# Patient Record
Sex: FEMALE | Race: WHITE | Employment: OTHER | URBAN - METROPOLITAN AREA
[De-identification: names, ages, dates, MRNs, and addresses within clinical notes are randomized per-mention and may not be internally consistent; named-entity substitution may affect disease eponyms.]

---

## 2017-02-21 ENCOUNTER — APPOINTMENT (OUTPATIENT)
Dept: GENERAL RADIOLOGY | Age: 77
DRG: 480 | End: 2017-02-21
Attending: PHYSICIAN ASSISTANT
Payer: MEDICARE

## 2017-02-21 ENCOUNTER — HOSPITAL ENCOUNTER (INPATIENT)
Age: 77
LOS: 5 days | Discharge: NURSING, REHAB OR LONG-TERM CARE | DRG: 480 | End: 2017-02-27
Attending: EMERGENCY MEDICINE | Admitting: HOSPITALIST
Payer: MEDICARE

## 2017-02-21 ENCOUNTER — APPOINTMENT (OUTPATIENT)
Dept: CT IMAGING | Age: 77
DRG: 480 | End: 2017-02-21
Attending: PHYSICIAN ASSISTANT
Payer: MEDICARE

## 2017-02-21 DIAGNOSIS — S72.422A CLOSED DISPLACED FRACTURE OF LATERAL CONDYLE OF LEFT FEMUR, INITIAL ENCOUNTER (HCC): Primary | ICD-10-CM

## 2017-02-21 DIAGNOSIS — S82.142A TIBIAL PLATEAU FRACTURE, LEFT, CLOSED, INITIAL ENCOUNTER: ICD-10-CM

## 2017-02-21 LAB
ALBUMIN SERPL BCP-MCNC: 4.1 G/DL (ref 3.4–5)
ALBUMIN/GLOB SERPL: 1.4 {RATIO} (ref 0.8–1.7)
ALP SERPL-CCNC: 66 U/L (ref 45–117)
ALT SERPL-CCNC: 30 U/L (ref 13–56)
ANION GAP BLD CALC-SCNC: 9 MMOL/L (ref 3–18)
APPEARANCE UR: ABNORMAL
AST SERPL W P-5'-P-CCNC: 28 U/L (ref 15–37)
BACTERIA URNS QL MICRO: ABNORMAL /HPF
BASOPHILS # BLD AUTO: 0 K/UL (ref 0–0.06)
BASOPHILS # BLD: 0 % (ref 0–2)
BILIRUB SERPL-MCNC: 0.4 MG/DL (ref 0.2–1)
BILIRUB UR QL: NEGATIVE
BUN SERPL-MCNC: 18 MG/DL (ref 7–18)
BUN/CREAT SERPL: 25 (ref 12–20)
CALCIUM SERPL-MCNC: 9.1 MG/DL (ref 8.5–10.1)
CHLORIDE SERPL-SCNC: 105 MMOL/L (ref 100–108)
CO2 SERPL-SCNC: 25 MMOL/L (ref 21–32)
COLOR UR: ABNORMAL
CREAT SERPL-MCNC: 0.71 MG/DL (ref 0.6–1.3)
DIFFERENTIAL METHOD BLD: ABNORMAL
EOSINOPHIL # BLD: 0 K/UL (ref 0–0.4)
EOSINOPHIL NFR BLD: 0 % (ref 0–5)
EPITH CASTS URNS QL MICRO: ABNORMAL /LPF (ref 0–5)
ERYTHROCYTE [DISTWIDTH] IN BLOOD BY AUTOMATED COUNT: 13.8 % (ref 11.6–14.5)
GLOBULIN SER CALC-MCNC: 2.9 G/DL (ref 2–4)
GLUCOSE SERPL-MCNC: 122 MG/DL (ref 74–99)
GLUCOSE UR STRIP.AUTO-MCNC: NEGATIVE MG/DL
HCT VFR BLD AUTO: 39.5 % (ref 35–45)
HGB BLD-MCNC: 13.4 G/DL (ref 12–16)
HGB UR QL STRIP: ABNORMAL
KETONES UR QL STRIP.AUTO: ABNORMAL MG/DL
LEUKOCYTE ESTERASE UR QL STRIP.AUTO: ABNORMAL
LYMPHOCYTES # BLD AUTO: 10 % (ref 21–52)
LYMPHOCYTES # BLD: 0.8 K/UL (ref 0.9–3.6)
MCH RBC QN AUTO: 35.5 PG (ref 24–34)
MCHC RBC AUTO-ENTMCNC: 33.9 G/DL (ref 31–37)
MCV RBC AUTO: 104.8 FL (ref 74–97)
MONOCYTES # BLD: 0.5 K/UL (ref 0.05–1.2)
MONOCYTES NFR BLD AUTO: 6 % (ref 3–10)
NEUTS SEG # BLD: 6.7 K/UL (ref 1.8–8)
NEUTS SEG NFR BLD AUTO: 84 % (ref 40–73)
NITRITE UR QL STRIP.AUTO: NEGATIVE
PH UR STRIP: 5 [PH] (ref 5–8)
PLATELET # BLD AUTO: 112 K/UL (ref 135–420)
PMV BLD AUTO: 11.4 FL (ref 9.2–11.8)
POTASSIUM SERPL-SCNC: 4.1 MMOL/L (ref 3.5–5.5)
PROT SERPL-MCNC: 7 G/DL (ref 6.4–8.2)
PROT UR STRIP-MCNC: 100 MG/DL
RBC # BLD AUTO: 3.77 M/UL (ref 4.2–5.3)
RBC #/AREA URNS HPF: ABNORMAL /HPF (ref 0–5)
SODIUM SERPL-SCNC: 139 MMOL/L (ref 136–145)
SP GR UR REFRACTOMETRY: 1.03 (ref 1–1.03)
UROBILINOGEN UR QL STRIP.AUTO: 1 EU/DL (ref 0.2–1)
WBC # BLD AUTO: 8.1 K/UL (ref 4.6–13.2)
WBC URNS QL MICRO: ABNORMAL /HPF (ref 0–4)

## 2017-02-21 PROCEDURE — 74011250637 HC RX REV CODE- 250/637: Performed by: HOSPITALIST

## 2017-02-21 PROCEDURE — 74011250636 HC RX REV CODE- 250/636: Performed by: HOSPITALIST

## 2017-02-21 PROCEDURE — 74011250636 HC RX REV CODE- 250/636: Performed by: PHYSICIAN ASSISTANT

## 2017-02-21 PROCEDURE — 99218 HC RM OBSERVATION: CPT

## 2017-02-21 PROCEDURE — 93005 ELECTROCARDIOGRAM TRACING: CPT

## 2017-02-21 PROCEDURE — 80053 COMPREHEN METABOLIC PANEL: CPT | Performed by: PHYSICIAN ASSISTANT

## 2017-02-21 PROCEDURE — 85025 COMPLETE CBC W/AUTO DIFF WBC: CPT | Performed by: PHYSICIAN ASSISTANT

## 2017-02-21 PROCEDURE — 99285 EMERGENCY DEPT VISIT HI MDM: CPT

## 2017-02-21 PROCEDURE — 77030032490 HC SLV COMPR SCD KNE COVD -B

## 2017-02-21 PROCEDURE — 96374 THER/PROPH/DIAG INJ IV PUSH: CPT

## 2017-02-21 PROCEDURE — 73560 X-RAY EXAM OF KNEE 1 OR 2: CPT

## 2017-02-21 PROCEDURE — 73700 CT LOWER EXTREMITY W/O DYE: CPT

## 2017-02-21 PROCEDURE — 71020 XR CHEST AP LAT: CPT

## 2017-02-21 PROCEDURE — 77030027138 HC INCENT SPIROMETER -A

## 2017-02-21 PROCEDURE — 81001 URINALYSIS AUTO W/SCOPE: CPT | Performed by: PHYSICIAN ASSISTANT

## 2017-02-21 PROCEDURE — 96376 TX/PRO/DX INJ SAME DRUG ADON: CPT

## 2017-02-21 RX ORDER — HYDROMORPHONE HYDROCHLORIDE 1 MG/ML
0.5 INJECTION, SOLUTION INTRAMUSCULAR; INTRAVENOUS; SUBCUTANEOUS
Status: DISCONTINUED | OUTPATIENT
Start: 2017-02-21 | End: 2017-02-22 | Stop reason: SDUPTHER

## 2017-02-21 RX ORDER — ERGOCALCIFEROL 1.25 MG/1
50000 CAPSULE ORAL
Status: DISCONTINUED | OUTPATIENT
Start: 2017-02-22 | End: 2017-02-27 | Stop reason: HOSPADM

## 2017-02-21 RX ORDER — MORPHINE SULFATE 2 MG/ML
1 INJECTION, SOLUTION INTRAMUSCULAR; INTRAVENOUS
Status: COMPLETED | OUTPATIENT
Start: 2017-02-21 | End: 2017-02-21

## 2017-02-21 RX ORDER — MORPHINE SULFATE 2 MG/ML
2 INJECTION, SOLUTION INTRAMUSCULAR; INTRAVENOUS
Status: DISCONTINUED | OUTPATIENT
Start: 2017-02-21 | End: 2017-02-21

## 2017-02-21 RX ORDER — LANOLIN ALCOHOL/MO/W.PET/CERES
1 CREAM (GRAM) TOPICAL 2 TIMES DAILY
Status: DISCONTINUED | OUTPATIENT
Start: 2017-02-21 | End: 2017-02-27 | Stop reason: HOSPADM

## 2017-02-21 RX ORDER — IBUPROFEN 200 MG
1 TABLET ORAL DAILY
Status: DISCONTINUED | OUTPATIENT
Start: 2017-02-22 | End: 2017-02-27 | Stop reason: HOSPADM

## 2017-02-21 RX ADMIN — FOLIC ACID TAB 400 MCG 1 MG: 400 TAB at 20:38

## 2017-02-21 RX ADMIN — HYDROMORPHONE HYDROCHLORIDE 0.5 MG: 1 INJECTION, SOLUTION INTRAMUSCULAR; INTRAVENOUS; SUBCUTANEOUS at 18:42

## 2017-02-21 RX ADMIN — MORPHINE SULFATE 1 MG: 2 INJECTION, SOLUTION INTRAMUSCULAR; INTRAVENOUS at 16:33

## 2017-02-21 RX ADMIN — HYDROMORPHONE HYDROCHLORIDE 0.5 MG: 1 INJECTION, SOLUTION INTRAMUSCULAR; INTRAVENOUS; SUBCUTANEOUS at 22:44

## 2017-02-21 RX ADMIN — MORPHINE SULFATE 1 MG: 2 INJECTION, SOLUTION INTRAMUSCULAR; INTRAVENOUS at 17:39

## 2017-02-21 NOTE — ED TRIAGE NOTES
Pt arrived via rescue from home with c/o left knee pain and swelling. Pt states she was getting off of the toilet when she lost her balance, landing on her left knee. Pt denies head injury or LOC.

## 2017-02-21 NOTE — ED NOTES
Went to assess patient and patient is currently in x-ray.  Assessment will be performed upon return to ER room

## 2017-02-21 NOTE — ED PROVIDER NOTES
Patient is a 78 y.o. female presenting with fall, knee pain, and knee swelling. The history is provided by the patient. Fall     Knee Pain      Knee Swelling         Eva Smith is a 78 y.o. female presents with c/o left knee pain. States fell today and landed on er knee. States hurts to straighten knee. Denies previous knee problem. Past Medical History:   Diagnosis Date    Rheumatoid arthritis, adult ([address]     Tachycardia        Past Surgical History:   Procedure Laterality Date    Hx hip replacement       left    Hx carotid endarterectomy       left    Hx cataract removal       bilateral         No family history on file. Social History     Social History    Marital status:      Spouse name: N/A    Number of children: N/A    Years of education: N/A     Occupational History    Not on file. Social History Main Topics    Smoking status: Current Every Day Smoker     Packs/day: 1.00    Smokeless tobacco: Not on file    Alcohol use No    Drug use: No    Sexual activity: Not on file     Other Topics Concern    Not on file     Social History Narrative         ALLERGIES: Aspirin and Codeine    Review of Systems  Constitutional:  Denies malaise, fever, chills. Head:  Denies injury. Face:  Denies injury or pain. ENMT:  Denies sore throat. Neck:  Denies injury or pain. Chest:  Denies injury. Cardiac:  Denies chest pain or palpitations. Respiratory:  Denies cough, wheezing, difficulty breathing, shortness of breath. GI/ABD:  Denies injury, pain, distention, nausea, vomiting, diarrhea. :  Denies injury, pain, dysuria or urgency. Back:  Denies injury or pain. Pelvis:  Denies injury or pain. Extremity/MS:  Knee pain  Neuro:  Denies headache, LOC, dizziness, neurologic symptoms/deficits/paresthesias. Skin: Denies injury, rash, itching or skin changes. There were no vitals filed for this visit.          Physical Exam   Nursing note and vitals reviewed. CONSTITUTIONAL: Alert, in no apparent distress; well-developed; well-nourished. HEAD:  Normocephalic, atraumatic. EYES: PERRL; EOM's intact. ENTM: Nose: No rhinorrhea; Throat: mucous membranes moist. Posterior pharynx-normal.  Neck:  No JVD, supple without lymphadenopathy. RESP: Chest clear, equal breath sounds. CV: S1 and S2 WNL; No murmurs, gallops or rubs. GI: Abdomen soft and non-tender. No masses or organomegaly. UPPER EXT:  Normal inspection. LOWER EXT: Left knee TTP over lateral aspect, Pt has knee splinted at 90., No hip tenderness, no erythema, good pedal pulse, good cap refill. NEURO: strength 5/5 and sym, sensation intact. SKIN: No rashes; Normal for age and stage. PSYCH:  Alert and oriented, normal affect. MDM  Number of Diagnoses or Management Options  Diagnosis management comments: DDX:Fracture, sprain, dislocation, contusion. IMPRESSION AND MEDICAL DECISION MAKING:  Based upon the patient's presentation with noted HPI and PE, along with the work up done in the emergency department, I believe that the patient has a femur fracture as well as a tibial plateau fracture. Will admit and have Ortho evaluate. Amount and/or Complexity of Data Reviewed  Clinical lab tests: ordered and reviewed  Tests in the radiology section of CPT®: ordered and reviewed  Tests in the medicine section of CPT®: ordered and reviewed  Discuss the patient with other providers: yes (Spoke with Ortho and advised to admit to Hospitalist and will follow her.   Spoke with Hospitalist and agreed to admit.)  Independent visualization of images, tracings, or specimens: yes      ED Course       Procedures

## 2017-02-22 ENCOUNTER — ANESTHESIA (OUTPATIENT)
Dept: SURGERY | Age: 77
DRG: 480 | End: 2017-02-22
Payer: MEDICARE

## 2017-02-22 ENCOUNTER — ANESTHESIA EVENT (OUTPATIENT)
Dept: SURGERY | Age: 77
DRG: 480 | End: 2017-02-22
Payer: MEDICARE

## 2017-02-22 ENCOUNTER — APPOINTMENT (OUTPATIENT)
Dept: GENERAL RADIOLOGY | Age: 77
DRG: 480 | End: 2017-02-22
Attending: ORTHOPAEDIC SURGERY
Payer: MEDICARE

## 2017-02-22 PROBLEM — S72.009A HIP FRACTURE (HCC): Status: ACTIVE | Noted: 2017-02-22

## 2017-02-22 LAB
ANION GAP BLD CALC-SCNC: 11 MMOL/L (ref 3–18)
ANION GAP BLD CALC-SCNC: 9 MMOL/L (ref 3–18)
APPEARANCE UR: ABNORMAL
ATRIAL RATE: 76 BPM
ATRIAL RATE: 99 BPM
BACTERIA URNS QL MICRO: NEGATIVE /HPF
BASOPHILS # BLD AUTO: 0 K/UL (ref 0–0.06)
BASOPHILS # BLD: 0 % (ref 0–2)
BILIRUB UR QL: NEGATIVE
BUN SERPL-MCNC: 11 MG/DL (ref 7–18)
BUN SERPL-MCNC: 12 MG/DL (ref 7–18)
BUN/CREAT SERPL: 14 (ref 12–20)
BUN/CREAT SERPL: 22 (ref 12–20)
CALCIUM SERPL-MCNC: 8.5 MG/DL (ref 8.5–10.1)
CALCIUM SERPL-MCNC: 9.3 MG/DL (ref 8.5–10.1)
CALCULATED P AXIS, ECG09: 42 DEGREES
CALCULATED P AXIS, ECG09: 82 DEGREES
CALCULATED R AXIS, ECG10: 15 DEGREES
CALCULATED R AXIS, ECG10: 30 DEGREES
CALCULATED T AXIS, ECG11: 118 DEGREES
CALCULATED T AXIS, ECG11: 125 DEGREES
CHLORIDE SERPL-SCNC: 100 MMOL/L (ref 100–108)
CHLORIDE SERPL-SCNC: 101 MMOL/L (ref 100–108)
CO2 SERPL-SCNC: 23 MMOL/L (ref 21–32)
CO2 SERPL-SCNC: 25 MMOL/L (ref 21–32)
COLOR UR: ABNORMAL
CREAT SERPL-MCNC: 0.54 MG/DL (ref 0.6–1.3)
CREAT SERPL-MCNC: 0.76 MG/DL (ref 0.6–1.3)
DIAGNOSIS, 93000: NORMAL
DIAGNOSIS, 93000: NORMAL
DIFFERENTIAL METHOD BLD: ABNORMAL
EOSINOPHIL # BLD: 0 K/UL (ref 0–0.4)
EOSINOPHIL NFR BLD: 0 % (ref 0–5)
EPITH CASTS URNS QL MICRO: NEGATIVE /LPF (ref 0–5)
ERYTHROCYTE [DISTWIDTH] IN BLOOD BY AUTOMATED COUNT: 13.6 % (ref 11.6–14.5)
GLUCOSE SERPL-MCNC: 127 MG/DL (ref 74–99)
GLUCOSE SERPL-MCNC: 175 MG/DL (ref 74–99)
GLUCOSE UR STRIP.AUTO-MCNC: 500 MG/DL
HCT VFR BLD AUTO: 33.9 % (ref 35–45)
HCT VFR BLD AUTO: 37.6 % (ref 35–45)
HGB BLD-MCNC: 11.4 G/DL (ref 12–16)
HGB BLD-MCNC: 12.5 G/DL (ref 12–16)
HGB UR QL STRIP: ABNORMAL
KETONES UR QL STRIP.AUTO: NEGATIVE MG/DL
LEUKOCYTE ESTERASE UR QL STRIP.AUTO: ABNORMAL
LYMPHOCYTES # BLD AUTO: 9 % (ref 21–52)
LYMPHOCYTES # BLD: 0.9 K/UL (ref 0.9–3.6)
MCH RBC QN AUTO: 34.5 PG (ref 24–34)
MCHC RBC AUTO-ENTMCNC: 33.2 G/DL (ref 31–37)
MCV RBC AUTO: 103.9 FL (ref 74–97)
MONOCYTES # BLD: 0.9 K/UL (ref 0.05–1.2)
MONOCYTES NFR BLD AUTO: 9 % (ref 3–10)
NEUTS SEG # BLD: 7.7 K/UL (ref 1.8–8)
NEUTS SEG NFR BLD AUTO: 82 % (ref 40–73)
NITRITE UR QL STRIP.AUTO: NEGATIVE
P-R INTERVAL, ECG05: 106 MS
P-R INTERVAL, ECG05: 136 MS
PH UR STRIP: 5 [PH] (ref 5–8)
PLATELET # BLD AUTO: 110 K/UL (ref 135–420)
PMV BLD AUTO: 11.4 FL (ref 9.2–11.8)
POTASSIUM SERPL-SCNC: 3.2 MMOL/L (ref 3.5–5.5)
POTASSIUM SERPL-SCNC: 3.8 MMOL/L (ref 3.5–5.5)
PROT UR STRIP-MCNC: 300 MG/DL
Q-T INTERVAL, ECG07: 370 MS
Q-T INTERVAL, ECG07: 402 MS
QRS DURATION, ECG06: 110 MS
QRS DURATION, ECG06: 110 MS
QTC CALCULATION (BEZET), ECG08: 452 MS
QTC CALCULATION (BEZET), ECG08: 474 MS
RBC # BLD AUTO: 3.62 M/UL (ref 4.2–5.3)
RBC #/AREA URNS HPF: NORMAL /HPF (ref 0–5)
SODIUM SERPL-SCNC: 134 MMOL/L (ref 136–145)
SODIUM SERPL-SCNC: 135 MMOL/L (ref 136–145)
SP GR UR REFRACTOMETRY: 1.02 (ref 1–1.03)
UROBILINOGEN UR QL STRIP.AUTO: 0.2 EU/DL (ref 0.2–1)
VENTRICULAR RATE, ECG03: 76 BPM
VENTRICULAR RATE, ECG03: 99 BPM
WBC # BLD AUTO: 9.5 K/UL (ref 4.6–13.2)
WBC URNS QL MICRO: NORMAL /HPF (ref 0–4)

## 2017-02-22 PROCEDURE — 99218 HC RM OBSERVATION: CPT

## 2017-02-22 PROCEDURE — 0QUC0JZ SUPPLEMENT LEFT LOWER FEMUR WITH SYNTHETIC SUBSTITUTE, OPEN APPROACH: ICD-10-PCS | Performed by: ORTHOPAEDIC SURGERY

## 2017-02-22 PROCEDURE — C1713 ANCHOR/SCREW BN/BN,TIS/BN: HCPCS | Performed by: ORTHOPAEDIC SURGERY

## 2017-02-22 PROCEDURE — 74011250636 HC RX REV CODE- 250/636

## 2017-02-22 PROCEDURE — 77030012407 HC DRN WND BARD -B: Performed by: ORTHOPAEDIC SURGERY

## 2017-02-22 PROCEDURE — 76210000016 HC OR PH I REC 1 TO 1.5 HR: Performed by: ORTHOPAEDIC SURGERY

## 2017-02-22 PROCEDURE — 76060000034 HC ANESTHESIA 1.5 TO 2 HR: Performed by: ORTHOPAEDIC SURGERY

## 2017-02-22 PROCEDURE — 74011000250 HC RX REV CODE- 250: Performed by: ORTHOPAEDIC SURGERY

## 2017-02-22 PROCEDURE — 85018 HEMOGLOBIN: CPT | Performed by: ORTHOPAEDIC SURGERY

## 2017-02-22 PROCEDURE — 77030020753 HC CUF TRNQT 1BLA STRY -B: Performed by: ORTHOPAEDIC SURGERY

## 2017-02-22 PROCEDURE — 77030008844 HC WRE K STRY -A: Performed by: ORTHOPAEDIC SURGERY

## 2017-02-22 PROCEDURE — 77030018836 HC SOL IRR NACL ICUM -A: Performed by: ORTHOPAEDIC SURGERY

## 2017-02-22 PROCEDURE — 77030002982 HC SUT POLYSRB J&J -A: Performed by: ORTHOPAEDIC SURGERY

## 2017-02-22 PROCEDURE — 36415 COLL VENOUS BLD VENIPUNCTURE: CPT | Performed by: HOSPITALIST

## 2017-02-22 PROCEDURE — 85025 COMPLETE CBC W/AUTO DIFF WBC: CPT | Performed by: HOSPITALIST

## 2017-02-22 PROCEDURE — 74011000258 HC RX REV CODE- 258

## 2017-02-22 PROCEDURE — 77030035002 HC SCR TIB LOK AXSOS STRY -C: Performed by: ORTHOPAEDIC SURGERY

## 2017-02-22 PROCEDURE — 74011000250 HC RX REV CODE- 250: Performed by: ANESTHESIOLOGY

## 2017-02-22 PROCEDURE — 77030020274 HC MISC IMPL ORTHOPEDIC: Performed by: ORTHOPAEDIC SURGERY

## 2017-02-22 PROCEDURE — 77030012510 HC MSK AIRWY LMA TELE -B: Performed by: ANESTHESIOLOGY

## 2017-02-22 PROCEDURE — 77030035005: Performed by: ORTHOPAEDIC SURGERY

## 2017-02-22 PROCEDURE — 77030035520 HC SCR CORT LOK AXSOS STRY -D: Performed by: ORTHOPAEDIC SURGERY

## 2017-02-22 PROCEDURE — 77030037063 HC PLT FEM DST LAT STRY -H1: Performed by: ORTHOPAEDIC SURGERY

## 2017-02-22 PROCEDURE — L1830 KO IMMOB CANVAS LONG PRE OTS: HCPCS | Performed by: ORTHOPAEDIC SURGERY

## 2017-02-22 PROCEDURE — 77030020253 HC SOL INJ D545NS .05 DEX .45 SAL

## 2017-02-22 PROCEDURE — 93306 TTE W/DOPPLER COMPLETE: CPT

## 2017-02-22 PROCEDURE — 77030035004: Performed by: ORTHOPAEDIC SURGERY

## 2017-02-22 PROCEDURE — 76010000162 HC OR TIME 1.5 TO 2 HR INTENSV-TIER 1: Performed by: ORTHOPAEDIC SURGERY

## 2017-02-22 PROCEDURE — 65270000029 HC RM PRIVATE

## 2017-02-22 PROCEDURE — 81001 URINALYSIS AUTO W/SCOPE: CPT | Performed by: HOSPITALIST

## 2017-02-22 PROCEDURE — 77030032490 HC SLV COMPR SCD KNE COVD -B: Performed by: ORTHOPAEDIC SURGERY

## 2017-02-22 PROCEDURE — 74011000258 HC RX REV CODE- 258: Performed by: INTERNAL MEDICINE

## 2017-02-22 PROCEDURE — 74011250637 HC RX REV CODE- 250/637: Performed by: HOSPITALIST

## 2017-02-22 PROCEDURE — 80048 BASIC METABOLIC PNL TOTAL CA: CPT | Performed by: HOSPITALIST

## 2017-02-22 PROCEDURE — 74011250636 HC RX REV CODE- 250/636: Performed by: HOSPITALIST

## 2017-02-22 PROCEDURE — 74011250636 HC RX REV CODE- 250/636: Performed by: ORTHOPAEDIC SURGERY

## 2017-02-22 PROCEDURE — 77030035512: Performed by: ORTHOPAEDIC SURGERY

## 2017-02-22 PROCEDURE — 74011000258 HC RX REV CODE- 258: Performed by: PHYSICIAN ASSISTANT

## 2017-02-22 PROCEDURE — 74011250637 HC RX REV CODE- 250/637: Performed by: ANESTHESIOLOGY

## 2017-02-22 PROCEDURE — 74011250636 HC RX REV CODE- 250/636: Performed by: PHYSICIAN ASSISTANT

## 2017-02-22 PROCEDURE — 74011250637 HC RX REV CODE- 250/637: Performed by: ORTHOPAEDIC SURGERY

## 2017-02-22 PROCEDURE — 77030013567 HC DRN WND RESERV BARD -A: Performed by: ORTHOPAEDIC SURGERY

## 2017-02-22 PROCEDURE — 0QSC04Z REPOSITION LEFT LOWER FEMUR WITH INTERNAL FIXATION DEVICE, OPEN APPROACH: ICD-10-PCS | Performed by: ORTHOPAEDIC SURGERY

## 2017-02-22 PROCEDURE — 74011000258 HC RX REV CODE- 258: Performed by: ORTHOPAEDIC SURGERY

## 2017-02-22 PROCEDURE — 74011000250 HC RX REV CODE- 250

## 2017-02-22 RX ORDER — SODIUM CHLORIDE 0.9 % (FLUSH) 0.9 %
5-10 SYRINGE (ML) INJECTION AS NEEDED
Status: DISCONTINUED | OUTPATIENT
Start: 2017-02-22 | End: 2017-02-27 | Stop reason: HOSPADM

## 2017-02-22 RX ORDER — DEXTROSE MONOHYDRATE AND SODIUM CHLORIDE 5; .45 G/100ML; G/100ML
INJECTION, SOLUTION INTRAVENOUS
Status: DISCONTINUED | OUTPATIENT
Start: 2017-02-22 | End: 2017-02-22 | Stop reason: HOSPADM

## 2017-02-22 RX ORDER — FAMOTIDINE 10 MG/ML
20 INJECTION INTRAVENOUS ONCE
Status: ACTIVE | OUTPATIENT
Start: 2017-02-22 | End: 2017-02-23

## 2017-02-22 RX ORDER — OXYCODONE AND ACETAMINOPHEN 7.5; 325 MG/1; MG/1
1 TABLET ORAL
Status: DISCONTINUED | OUTPATIENT
Start: 2017-02-22 | End: 2017-02-27 | Stop reason: HOSPADM

## 2017-02-22 RX ORDER — PROPOFOL 10 MG/ML
INJECTION, EMULSION INTRAVENOUS AS NEEDED
Status: DISCONTINUED | OUTPATIENT
Start: 2017-02-22 | End: 2017-02-22 | Stop reason: HOSPADM

## 2017-02-22 RX ORDER — SODIUM CHLORIDE, SODIUM LACTATE, POTASSIUM CHLORIDE, CALCIUM CHLORIDE 600; 310; 30; 20 MG/100ML; MG/100ML; MG/100ML; MG/100ML
75 INJECTION, SOLUTION INTRAVENOUS CONTINUOUS
Status: DISCONTINUED | OUTPATIENT
Start: 2017-02-22 | End: 2017-02-23

## 2017-02-22 RX ORDER — INSULIN LISPRO 100 [IU]/ML
INJECTION, SOLUTION INTRAVENOUS; SUBCUTANEOUS ONCE
Status: ACTIVE | OUTPATIENT
Start: 2017-02-22 | End: 2017-02-23

## 2017-02-22 RX ORDER — FENTANYL CITRATE 50 UG/ML
50 INJECTION, SOLUTION INTRAMUSCULAR; INTRAVENOUS
Status: DISCONTINUED | OUTPATIENT
Start: 2017-02-22 | End: 2017-02-22 | Stop reason: HOSPADM

## 2017-02-22 RX ORDER — SODIUM CHLORIDE 0.9 % (FLUSH) 0.9 %
5-10 SYRINGE (ML) INJECTION EVERY 8 HOURS
Status: DISCONTINUED | OUTPATIENT
Start: 2017-02-22 | End: 2017-02-27 | Stop reason: SDUPTHER

## 2017-02-22 RX ORDER — ALBUTEROL SULFATE 0.83 MG/ML
SOLUTION RESPIRATORY (INHALATION)
Status: DISPENSED
Start: 2017-02-22 | End: 2017-02-23

## 2017-02-22 RX ORDER — MORPHINE SULFATE 2 MG/ML
2 INJECTION, SOLUTION INTRAMUSCULAR; INTRAVENOUS
Status: DISCONTINUED | OUTPATIENT
Start: 2017-02-22 | End: 2017-02-22 | Stop reason: HOSPADM

## 2017-02-22 RX ORDER — DEXTROSE MONOHYDRATE AND SODIUM CHLORIDE 5; .45 G/100ML; G/100ML
75 INJECTION, SOLUTION INTRAVENOUS CONTINUOUS
Status: DISCONTINUED | OUTPATIENT
Start: 2017-02-22 | End: 2017-02-23

## 2017-02-22 RX ORDER — ALBUTEROL SULFATE 0.83 MG/ML
2.5 SOLUTION RESPIRATORY (INHALATION)
Status: COMPLETED | OUTPATIENT
Start: 2017-02-22 | End: 2017-02-22

## 2017-02-22 RX ORDER — ENOXAPARIN SODIUM 100 MG/ML
40 INJECTION SUBCUTANEOUS DAILY
Status: DISCONTINUED | OUTPATIENT
Start: 2017-02-23 | End: 2017-02-25

## 2017-02-22 RX ORDER — LIDOCAINE HYDROCHLORIDE 20 MG/ML
INJECTION, SOLUTION EPIDURAL; INFILTRATION; INTRACAUDAL; PERINEURAL AS NEEDED
Status: DISCONTINUED | OUTPATIENT
Start: 2017-02-22 | End: 2017-02-22 | Stop reason: HOSPADM

## 2017-02-22 RX ORDER — SODIUM CHLORIDE 0.9 % (FLUSH) 0.9 %
5-10 SYRINGE (ML) INJECTION EVERY 8 HOURS
Status: DISCONTINUED | OUTPATIENT
Start: 2017-02-22 | End: 2017-02-27 | Stop reason: HOSPADM

## 2017-02-22 RX ORDER — FENTANYL CITRATE 50 UG/ML
INJECTION, SOLUTION INTRAMUSCULAR; INTRAVENOUS AS NEEDED
Status: DISCONTINUED | OUTPATIENT
Start: 2017-02-22 | End: 2017-02-22 | Stop reason: HOSPADM

## 2017-02-22 RX ORDER — NALOXONE HYDROCHLORIDE 0.4 MG/ML
0.4 INJECTION, SOLUTION INTRAMUSCULAR; INTRAVENOUS; SUBCUTANEOUS AS NEEDED
Status: DISCONTINUED | OUTPATIENT
Start: 2017-02-22 | End: 2017-02-27 | Stop reason: HOSPADM

## 2017-02-22 RX ORDER — DOCUSATE SODIUM 100 MG/1
100 CAPSULE, LIQUID FILLED ORAL 2 TIMES DAILY
Status: DISCONTINUED | OUTPATIENT
Start: 2017-02-22 | End: 2017-02-27 | Stop reason: HOSPADM

## 2017-02-22 RX ORDER — HYDROMORPHONE HYDROCHLORIDE 1 MG/ML
1 INJECTION, SOLUTION INTRAMUSCULAR; INTRAVENOUS; SUBCUTANEOUS
Status: DISCONTINUED | OUTPATIENT
Start: 2017-02-22 | End: 2017-02-23

## 2017-02-22 RX ORDER — ACETAMINOPHEN 650 MG/1
975 SUPPOSITORY RECTAL AS NEEDED
Status: DISCONTINUED | OUTPATIENT
Start: 2017-02-22 | End: 2017-02-27 | Stop reason: HOSPADM

## 2017-02-22 RX ORDER — SODIUM CHLORIDE, SODIUM LACTATE, POTASSIUM CHLORIDE, CALCIUM CHLORIDE 600; 310; 30; 20 MG/100ML; MG/100ML; MG/100ML; MG/100ML
25 INJECTION, SOLUTION INTRAVENOUS CONTINUOUS
Status: DISCONTINUED | OUTPATIENT
Start: 2017-02-22 | End: 2017-02-22 | Stop reason: HOSPADM

## 2017-02-22 RX ADMIN — DOCUSATE SODIUM 100 MG: 100 CAPSULE, LIQUID FILLED ORAL at 21:30

## 2017-02-22 RX ADMIN — LIDOCAINE HYDROCHLORIDE 30 MG: 20 INJECTION, SOLUTION EPIDURAL; INFILTRATION; INTRACAUDAL; PERINEURAL at 15:45

## 2017-02-22 RX ADMIN — ACETAMINOPHEN 1000 MG: 160 SOLUTION ORAL at 20:12

## 2017-02-22 RX ADMIN — CEFAZOLIN 1 G: 1 INJECTION, POWDER, FOR SOLUTION INTRAVENOUS at 16:01

## 2017-02-22 RX ADMIN — DEXTROSE MONOHYDRATE AND SODIUM CHLORIDE 75 ML/HR: 5; .45 INJECTION, SOLUTION INTRAVENOUS at 09:54

## 2017-02-22 RX ADMIN — FENTANYL CITRATE 50 MCG: 50 INJECTION, SOLUTION INTRAMUSCULAR; INTRAVENOUS at 15:49

## 2017-02-22 RX ADMIN — ALBUTEROL SULFATE 2.5 MG: 2.5 SOLUTION RESPIRATORY (INHALATION) at 18:16

## 2017-02-22 RX ADMIN — Medication 10 ML: at 17:40

## 2017-02-22 RX ADMIN — DEXTROSE MONOHYDRATE AND SODIUM CHLORIDE 75 ML/HR: 5; .45 INJECTION, SOLUTION INTRAVENOUS at 17:40

## 2017-02-22 RX ADMIN — HYDROMORPHONE HYDROCHLORIDE 0.5 MG: 1 INJECTION, SOLUTION INTRAMUSCULAR; INTRAVENOUS; SUBCUTANEOUS at 12:40

## 2017-02-22 RX ADMIN — CEFAZOLIN SODIUM 1 G: 1 INJECTION, POWDER, FOR SOLUTION INTRAMUSCULAR; INTRAVENOUS at 21:33

## 2017-02-22 RX ADMIN — DEXTROSE MONOHYDRATE AND SODIUM CHLORIDE: 5; .45 INJECTION, SOLUTION INTRAVENOUS at 15:42

## 2017-02-22 RX ADMIN — FENTANYL CITRATE 50 MCG: 50 INJECTION, SOLUTION INTRAMUSCULAR; INTRAVENOUS at 16:45

## 2017-02-22 RX ADMIN — PROPOFOL 100 MG: 10 INJECTION, EMULSION INTRAVENOUS at 15:45

## 2017-02-22 RX ADMIN — HYDROMORPHONE HYDROCHLORIDE 0.5 MG: 1 INJECTION, SOLUTION INTRAMUSCULAR; INTRAVENOUS; SUBCUTANEOUS at 04:00

## 2017-02-22 RX ADMIN — HYDROMORPHONE HYDROCHLORIDE 1 MG: 1 INJECTION, SOLUTION INTRAMUSCULAR; INTRAVENOUS; SUBCUTANEOUS at 22:46

## 2017-02-22 RX ADMIN — HYDROMORPHONE HYDROCHLORIDE 0.5 MG: 1 INJECTION, SOLUTION INTRAMUSCULAR; INTRAVENOUS; SUBCUTANEOUS at 08:09

## 2017-02-22 NOTE — PROGRESS NOTES
Miriam   Discharge Planning/ Assessment    Reasons for Intervention: Interviewed patient, she agreed to share her discharge information with her , see below. Spoke to the patients daughter, consented, she stated she is POA, requested that she bring her POA paperwork in to be scanned into the patients record. She is NPO awaiting possible surgical intervention, cardiology consulted for clearance. She used a walker prior to admission and see Dr Maria Antonia Guevara for her primary care needs. She may require rehab and she has been placed in  E discharge , matching held at present. Case management will follow for discharge needs.      High Risk Criteria  [x] Yes  []No   Physician Referral  [] Yes  [x]No        Date    Nursing Referral  [] Yes  [x]No        Date    Patient/Family Request  [] Yes  [x]No        Date       Resources:    Medicare  [x] Yes  []No   Medicaid  [] Yes  [x]No   No Resources  [] Yes  [x]No   Private Insurance  [] Yes  [x]No    Name/Phone Number    Other  [] Yes  [x]No        (i.e. Workman's Comp)         Prior Services:    Prior Services  [] Yes  [x]No   Home Health  [] Yes  [x]No   [address] [place]  [] Yes  [x]No        Number of [address]  [] Yes  [x]No       Meals on Wheels  [] Yes  [x]No   Office on Aging  [] Yes  [x]No   Transportation Services  [] Yes  [x]No   Nursing Home  [] Yes  [x]No        Nursing Home Name    [address]  [] Yes  [x]No        [address] Name    Other       Information Source:      Information obtained from  [x] Patient  [] Parent   [] Guardian  [x] Child  [] Spouse   [] Significant Other/Partner   [] Friend      [] EMS    [] Nursing Home Chart          [x] Other: chart   Chart Review  [x] Yes  []No     Family/Support System:    Patient lives with  [] Alone    [x] Spouse   [] Significant Other  [] Children  [] Caretaker   [] Parent  [] Sibling     [] Other       Other Support System:    Is the patient responsible for care of others  [] Yes  [x]No   Information of person caring for patient on  discharge self   Managers financial affairs independently  [x] Yes  []No   If no, explain:      Status Prior to Admission:    Mental Status  [x] Awake  [x] Alert  [x] Oriented  [] Quiet/Calm [] Lethargic/Sedated   [] Disoriented  [] Restless/Anxious  [] Combative   Personal Care  [] Dependent  [x] [address]  [] Requires Assistance   Meal Preparation Ability  [x] Independent   [] Standby Assistance   [x] Minimal Assistance   [] Moderate Assistance  [] Maximum Assistance     [] Total Assistance   Chores  [] Independent with Chores   [] N/A Nursing Home Resident   [x] Requires Assistance   Bowel/Bladder  [x] Continent  [] Catheter  [] Incontinent  [] Ostomy Self-Care    [] Urine Diversion Self-Care  [] Maximum Assistance     [] Total Assistance   Number of Persons needed for assistance    DME at home  [] [employer], Fawn Bhatti  [] [employer], Straight   [] Commode    [] Bathroom/Grab Bars  [] Hospital Bed  [] Nebulizer  [] Oxygen           [] Raised Toilet Seat  [] Shower Chair  [] Side Rails for Bed   [] Tub Transfer Bench   [x] Teodora Beecadenm  [] Arnol Infante      [] Other:   Vendor      Treatment Presently Receiving:    Current Treatments  [] Chemotherapy  [] Dialysis  [] Insulin  [] IVAB [x] IVF   [] O2  [] PCA   [] PT   [] RT   [] Tube Feedings   [] Wound Care     Psychosocial Evaluation:    Verbalized Knowledge of Disease Process  [x] Patient  [x]Family   Coping with Disease Process  [x] Patient  [x]Family   Requires Further Counseling Coping with Disease Process  [] Patient  []Family     Identified Projected Needs:    Home Health Aid  [] Yes  [x]No   Transportation  [x] Yes  []No   Education  [] Yes  [x]No        Specific Education     Financial Counseling  [] Yes  [x]No   Inability to Care for Self/Will Require 24 hour care  [] Yes  [x]No   Pain Management  [] Yes  [x]No   Home Infusion Therapy  [] Yes  [x]No Oxygen Therapy  [] Yes  [x]No   DME  [] Yes  [x]No   Long Term Care Placement  [] Yes  [x]No   Rehab  [x] Yes  []No   Physical Therapy  [x] Yes  []No   Needs Anticipated At This Time  [x] Yes  []No     Intra-Hospital Referral:    [facility]  [x] Yes  []No     [] Yes  [x]No   Patient Representative  [] Yes  [x]No   Staff for Teaching Needs  [] Yes  [x]No   Specialty Teaching Needs     Diabetic Educator  [] Yes  [x]No   Referral for Diabetic Educator Needed  [] Yes  [x]No  If Yes, place order for Nutritionist or Diabetic Consult     Tentative Discharge Plan:    Home with No Services  [] Yes  [x]No   Home with Home Health Follow-up  [x] Yes  []No        If Yes, specify type nursing/therapy   [address] [place] [address]  [] Yes  [x]No        If Yes, specify type    Meals on Wheels  [] Yes  [x]No   Office of Aging  [] Yes  [x]No   NHP  [] Yes  [x]No   Return to the Nursing Home  [] Yes  [x]No   Rehab Therapy  [x] Yes  []No   Acute Rehab  [] Yes  [x]No   Subacute Rehab  [x] Yes  [x]No   Private Care  [] Yes  [x]No   Substance Abuse Referral  [] Yes  [x]No   Transportation  [] Yes  [x]No   Chore Service  [] Yes  [x]No   Inpatient Hospice  [] Yes  [x]No   OP RT  [] Yes  [x] No   OP Hemo  [] Yes  [x] No   OP PT  [x] Yes  []No   Support Group  [] Yes  [x]No   Reach to Recovery  [] Yes  [x]No   OP Oncology Clinic  [] Yes  [x]No   Clinic Appointment  [] Yes  [x]No   DME  [] Yes  [x]No   Comments    Name of D/C Planner or  Given to Patient or Family Teresa Natarajan, GRISEL   Phone Number [phone]   Date Feb 22, 2017   Time 732 am   If you are discharged home, whom do you designate to participate in your discharge plan and receive any information needed?      Enter name of Hitesh Huang  spouse        Phone # of designee         Address of [address] [place] [address] [address]        Updated Feb 22, 2017        Patient refused to designate any individual

## 2017-02-22 NOTE — PROGRESS NOTES
conducted an initial consultation and Spiritual Assessment for Eva Smith, who is a 78 y.o.,female. Patients Primary Language is: [place]. According to the patients EMR Moravian Affiliation is: Other. The reason the Patient came to the hospital is: There are no active problems to display for this patient. The  provided the following Interventions:   attempted ericka Raygoza o initiate a relationship of care and support with patient in room 2219 at 0930 this morning but found patient not in any condition to communicate this morning. Listened empathically to family members as patient was lying in bed with blankets pulled up. Patient was resting and still in some discomfort from surgery. Provided information about Spiritual Care Services. Offered prayer and assurance of continued prayers on patients behalf. The following outcomes were achieved:  Patient shared limited information about her medical narrative. Patient processed feeling about current hospitalization. Patient expressed gratitude for pastoral care visit. Assessment:  Patient does not have any Mu-ism/cultural needs that will affect patients preferences in health care. There are no further spiritual or Mu-ism issues which require Spiritual Care Services interventions at this time. Plan:  Chaplains will continue to follow and will provide pastoral care on an as needed/requested basis    . Aliyah Morales   Spiritual Care   [phone]

## 2017-02-22 NOTE — PROGRESS NOTES
1800 - pt received pt on floor. Pt is AO. Pain rated 10/10 to left knee. 65 - paged Dr. Colt Hernandez for order for continuous fluid and possible bp medication. Pt's BP is elevated 195/72.     1938 - received no call back from page.

## 2017-02-22 NOTE — CONSULTS
Consult    Patient: Mary Carmen Day MRN: [id number]  SSN: [id number]    YOB: 1940  Age: 78 y.o. Sex: female      Subjective:      Mary Carmen Day is a 78 y.o. female PMHx of osteoporosis, tobacco use who presented to the ED with acute onset of L knee pain after a mechanical fall at home. she was using the bathroom and when she got up and was pulling her pants up she lost her balance and fell to the ground on her left leg. In the ED, imaging showed an acute fracture of the distal L femur . She is unable to straighten her knee due to pain and deformity. Patient denies head trauma or LOC. Patient's daughter is at the bedside. Past Medical History:   Diagnosis Date    Rheumatoid arthritis, adult ([address]     Tachycardia      Past Surgical History:   Procedure Laterality Date    HX CAROTID ENDARTERECTOMY      left    HX CATARACT REMOVAL      bilateral    HX HIP REPLACEMENT      left      No family history on file.   Social History   Substance Use Topics    Smoking status: Current Every Day Smoker     Packs/day: 1.00    Smokeless tobacco: Not on file    Alcohol use No      Current Facility-Administered Medications   Medication Dose Route Frequency Provider Last Rate Last Dose    dextrose 5 % - 0.45% NaCl infusion  75 mL/hr IntraVENous CONTINUOUS Bryan Oliveira MD 75 mL/hr at 02/22/17 0954 75 mL/hr at 02/22/17 0954    HYDROmorphone (PF) (DILAUDID) injection 0.5 mg  0.5 mg IntraVENous Q4H PRN Fela Goncalves, DO   0.5 mg at 02/22/17 7792    ergocalciferol (ERGOCALCIFEROL) capsule 50,000 Units  50,000 Units Oral Q7D Fela Goncalves, DO   Stopped at 28/07/59 2230    folic acid tablet 1 mg  1 mg Oral BID Fela Goncalves, DO   Stopped at 02/22/17 0900    nicotine (NICODERM CQ) 14 mg/24 hr patch 1 Patch  1 Patch TransDERmal DAILY Fela Goncalves, DO   1 Patch at 02/22/17 3890        Allergies   Allergen Reactions    Aspirin Unknown (comments)    Codeine Unknown (comments) Review of Systems:  Pertinent items are noted in the History of Present Illness. Objective:     Vitals:    02/21/17 2355 02/22/17 0451 02/22/17 0836 02/22/17 0927   BP: 159/72 175/63 168/69    Pulse: 100 98 96    Resp: 15 15 15    Temp: 97.8 °F (36.6 °C) 98 °F (36.7 °C) 98 °F (36.7 °C)    SpO2: 92% 91% (!) 89%    Weight:    39.9 kg (88 lb)   Height:    4' 11\" (1.499 m)        Physical Exam:  [id number]  LLE warm and perfused, L Knee flexed to 90 degrees, no open wounds, ttp diffusely over knee, +effusion, AT and GS motor function 5/5 , sensation intact to light touch, 2+ DP pulse. No pain with ROM hip. RLE warm and perfused,R Knee nontender along joint line, no erythema or edema, full ROM, stable to varus/valgus/anterior/posterior forces, 2+ DP pulse. BLE compartments soft. Xray L knee:  1. Impacted, angulated, and intra-articular fracture of the distal left femur,  with mild lateral displacement of the distal fracture fragment. CT L knee  IMPRESSION:  1. Comminuted, impacted, intra-articular fracture of the distal left femur, with  predominant involvement of the distal lateral femoral condyle and  intra-articular extension across the intercondylar notch. 2. Nondisplaced fracture of the lateral tibial plateau, with minimal articular  surface incongruency. 3. Large volume lipohemarthrosis. 4. Severe underlying osteopenia.   Assessment:   77 yo female with Impacted, angulated, and intra-articular fracture of the distal left femur s/p fall at home yesterday    Plan:     OR today with  for ORIF left distal femur  NPO for surgery  Cleared from medicine and cardiology standpoint      Signed By: KOLE Ramirez     February 22, 2017

## 2017-02-22 NOTE — PROGRESS NOTES
Problem: Discharge Planning  Goal: *Discharge to safe environment  Outcome: Progressing Towards Goal  Plan is to discharge to a safe envioronment

## 2017-02-22 NOTE — PROGRESS NOTES
Received bedside verbal report from [facility] in bed,complaining of pain and wants pain med now,explain her that it's quite not time yet,bed at the lowest position,call bell within reach,white board updated. 0809 Pain med given,morning med given,complate assessment done,will continue to monitor her. 0930 Pt still having pain,reassured her,helped her to use bedpan,daughter at bedside. 1130 Pt is sleeping,daughter at bedside,call bell within reach. 1240 Pt sleeping,paim med given,will continue to monitor her. 5 Pt left room with OR tech. 1911 Pt arrived to room,family at bedside. Bedside and Verbal shift change report given to Rashid Walker (oncoming nurse) by Janes Yu (offgoing nurse). Report included the following information SBAR, Kardex, OR Summary, Intake/Output, MAR and Recent Results.

## 2017-02-22 NOTE — ANESTHESIA POSTPROCEDURE EVALUATION
Post-Anesthesia Evaluation and Assessment    Patient: Farhan Morris MRN: [id number]  SSN: [id number]    YOB: 1940  Age: 78 y.o. Sex: female       Cardiovascular Function/Vital Signs  Visit Vitals    /83    Pulse (!) 118    Temp 37.8 °C (100.1 °F)    Resp 27    Ht 4' 11\" (1.499 m)    Wt 39.9 kg (88 lb)    SpO2 94%    BMI 17.77 kg/m2       Patient is status post spinal, general anesthesia for Procedure(s):    LEFT DISTAL FEMUR OPEN REDUCTION INTERNAL FIXATION. Nausea/Vomiting: None    Postoperative hydration reviewed and adequate. Pain:  Pain Scale 1: (P) Visual (02/22/17 1806)  Pain Intensity 1: (P) 0 (02/22/17 1806)   Managed    Neurological Status:   Neuro (WDL): Within Defined Limits (immediate post op, still sedated) (02/22/17 1721)   At baseline    Mental Status and Level of Consciousness: Arousable    Pulmonary Status:   O2 Device: Non-rebreather mask (02/22/17 1736)   Adequate oxygenation and airway patent    Complications related to anesthesia: None    Post-anesthesia assessment completed.  No concerns    Signed By: Deana Roach MD     February 22, 2017

## 2017-02-22 NOTE — PROGRESS NOTES
Nutrition initial assessment/Plan of care      RECOMMENDATIONS:   1. NPO. Advance diet when medically indicated  2. Monitor weight and PO intake  3. RD to follow     GOALS:   1. PO intake meets >75% of protein/calorie needs by 2/25  2. Weight Maintenance/Gradual weight gain (1-2 lb) by 2/29    ASSESSMENT:   Per BMI of 17.8, weight is in the underweight classification. PO intake is poor vs NPO order. Labs noted. Nutrition recommendations listed. RD to follow. Nutrition Diagnoses:   Underweight related to poor appetite as evidenced by BMI of 17.8. Nutrition Risk:  [x] High  [] Moderate []  Low    SUBJECTIVE/OBJECTIVE:   Pt with acute left distal femur fracture and left tibial plateau fracture. Currently NPO for possible surgery. No known food allergy. Pt's family report weight of 88 lb and states that her weight has never been over 100 lb. No trouble chewing/swallowing however pt usually wear dentures and doesn't have them with her. Will follow. Information Obtained from:    [x] Chart Review   [x] Patient   [x] Family/Caregiver   [] Nurse/Physician   [] Interdisciplinary Meeting/Rounds    Diet: NPO  Medications: [x] Reviewed (Ergocalciferol, Dextrose5%-0.45%NaCl: 75 ml/hr)   Allergies: [x] Reviewed   Encounter Diagnoses     ICD-10-CM ICD-9-CM   1. Closed displaced fracture of lateral condyle of left femur, initial encounter ([facility] S72.422A 821.21   2.  Tibial plateau fracture, left, closed, initial encounter S82.142A 823.00     Past Medical History:   Diagnosis Date    Rheumatoid arthritis, adult ([facility]     Tachycardia       Labs:    Lab Results   Component Value Date/Time    Sodium 135 02/22/2017 03:30 AM    Potassium 3.8 02/22/2017 03:30 AM    Chloride 101 02/22/2017 03:30 AM    CO2 23 02/22/2017 03:30 AM    Anion gap 11 02/22/2017 03:30 AM    Glucose 127 02/22/2017 03:30 AM    BUN 12 02/22/2017 03:30 AM    Creatinine 0.54 02/22/2017 03:30 AM    Calcium 9.3 02/22/2017 03:30 AM    Magnesium 1.8 08/18/2010 06:15 AM    Phosphorus 2.8 08/17/2010 05:45 AM    Albumin 4.1 02/21/2017 03:40 PM     Anthropometrics: BMI (calculated): 17.8  Last 3 Recorded Weights in this Encounter    02/22/17 0927   Weight: 39.9 kg (88 lb)      Ht Readings from Last 1 Encounters:   02/22/17 4' 11\" (1.499 m)       IBW: 95 lb %IBW: 93% UBW: 90 lb %UBW: 98%   [] Weight Loss [] Weight Gain [x] Weight Stable    Estimated Nutrition Needs: [x] MSJ  [] Other:  Calories: 1300 kcal Based on:   [x] Actual BW    Protein:   50-65 g Based on:   [x] Actual BW    Fluid:       6331-8890 ml Based on:   [x] Actual BW      [x] No Cultural, Jew or ethnic dietary need identified.     [] Cultural, Jew and ethnic food preferences identified and addressed     Wt Status:  [] Normal (18.6 - 24.9) [x] Underweight (<18.5) [] Overweight (25 - 29.9) [] Mild Obesity (30 - 34.9)  [] Moderate Obesity (35 - 39.9) [] Morbid Obesity (40+)   [x] Moderate Malnutrition [] Severe Malnutrition in the context of :     Nutrition Problems Identified:   [x] Suboptimal PO intake   [] Food Allergies  [] Difficulty chewing/swallowing/poor dentition  [] Constipation/Diarrhea   [] Nausea/Vomiting   [] None  [] Other:     Plan:   [] Therapeutic Diet  []  Obtained/adjusted food preferences/tolerances and/or snacks options   []  Supplements added   [] Occupational therapy following for feeding techniques  []  HS snack added   []  Modify diet texture   []  Modify diet for food allergies   []  Educate patient   []  Assist with menu selection   []  Monitor PO intake on meal rounds   [x]  Continue inpatient monitoring and intervention   []  Participated in discharge planning/Interdisciplinary rounds/Team meetings   []  Other:     Education Needs:   [x] Not appropriate for teaching at this time    [] Identified and addressed    Nutrition Monitoring and Evaluation:  [x] Continue ongoing monitoring and intervention  [] Other    Sean Carlisle, [address] [place] [address]  Pager: [phone]

## 2017-02-22 NOTE — PROGRESS NOTES
Internal Medicine Progress Note    Patient's Name: Milderd Standard  Admit Date: 2/21/2017  Length of Stay: 0      Assessment/Plan     #Acute L Distal Femur Fracture  #Acute L Tibial Plateau Fracture  #Abnormal EKG  #Arthritis  #Osteoporosis  #Tobacco Use  #DVT PPx     - Consult ortho pending. Remains NPO. Pain control PRN. PT/OT. - Tobacco cessation discussion. Nicotine patch  - Cardiology consulted, pt low-medium cardiovascular risk. Echo pending  - SCDs w/ lovenox post procedure    I have personally reviewed all pertinent labs and films that have officially resulted over the last 24 hours. I have personally checked for all pending labs that are awaiting final results.     Subjective     Pt s/e @ bedside  No major events overnight  Pt c/o LLE pain  Denies CP or SOB    Objective     Visit Vitals    /69 (BP 1 Location: Right arm, BP Patient Position: At rest)    Pulse 96    Temp 98 °F (36.7 °C)    Resp 15    Ht 4' 11\" (1.499 m)    Wt 39.9 kg (88 lb)    SpO2 (!) 89%    BMI 17.77 kg/m2       Physical Exam:  General Appearance: NAD, appears in distress from pain  Lungs: CTA with normal respiratory effort  CV: RRR, no m/r/g  Abdomen: soft, non-tender, normal bowel sounds  Extremities: no cyanosis, no peripheral edema  Neuro: Lethargic, moves all ext    Lab/Data Reviewed:  BMP:   Lab Results   Component Value Date/Time     (L) 02/22/2017 03:30 AM    K 3.8 02/22/2017 03:30 AM     02/22/2017 03:30 AM    CO2 23 02/22/2017 03:30 AM    AGAP 11 02/22/2017 03:30 AM     (H) 02/22/2017 03:30 AM    BUN 12 02/22/2017 03:30 AM    CREA 0.54 (L) 02/22/2017 03:30 AM    GFRAA >60 02/22/2017 03:30 AM    GFRNA >60 02/22/2017 03:30 AM     CBC:   Lab Results   Component Value Date/Time    WBC 9.5 02/22/2017 03:30 AM    HGB 12.5 02/22/2017 03:30 AM    HCT 37.6 02/22/2017 03:30 AM     (L) 02/22/2017 03:30 AM       Imaging Reviewed:  Xr Knee Lt Max 2 Vws    Result Date: 2/21/2017  Examination: Left knee one or 2 views. HISTORY: Left knee pain status post trauma. FINDINGS: Attempted AP and crosstable lateral views of the left knee on a total of 5 cassettes are performed and interpreted without comparison. There is an angulated, mildly impacted, and intra-articular fracture of the distal left femur, with extension through the anterolateral aspect of the intercondylar notch. There is mild lateral displacement of the distal fracture fragment. With the knee in a flexed position, there is a small knee joint effusion noted. Overall osteopenia present. Atherosclerotic calcifications noted. IMPRESSION: 1. Impacted, angulated, and intra-articular fracture of the distal left femur, with mild lateral displacement of the distal fracture fragment. .    Ct Knee Lt Wo Cont    Result Date: 2/21/2017  Examination: CT left knee without contrast. HISTORY: Fall, distal femoral fracture. TECHNIQUE: CT imaging of the left knee was performed in the axial plane, utilizing both bone and soft tissue reconstruction algorithms. Additional coronal and sagittal reformatted images were performed by the technologist and are included in interpretation. One or more dose reduction techniques were used on this CT: automated exposure control, adjustment of the mAs and/or kVp according to patient's size, and iterative reconstruction techniques. The specific techniques utilized on this CT exam have been documented in the patient's electronic medical record. COMPARISON: Radiographs performed 2/21/2017. FINDINGS: In keeping with the radiographic findings, there is a comminuted, impacted, and intra-articular fracture of the distal femur, with predominant involvement of the distal lateral femoral condyle. There is mild comminution of the lateral femoral condyle present, with slight apex medial angulation present. The intra-articular nature of the fracture is noted with extension through the intercondylar notch of the femur.  In addition, there is a nondisplaced fracture of the lateral tibial plateau, with minimal if any articular surface incongruency. There is severe underlying osteopenia present. The joint spaces of the knee demonstrate minimal medial compartment predominant joint space loss. The extensor mechanism is intact. There is a large volume lipohemarthrosis. Atherosclerotic calcifications are noted in the distal thigh and proximal leg. IMPRESSION: 1. Comminuted, impacted, intra-articular fracture of the distal left femur, with predominant involvement of the distal lateral femoral condyle and intra-articular extension across the intercondylar notch. 2. Nondisplaced fracture of the lateral tibial plateau, with minimal articular surface incongruency. 3. Large volume lipohemarthrosis. 4. Severe underlying osteopenia. Xr Chest Ap Lat    Result Date: 2/21/2017  EXAM:  XR CHEST AP LAT INDICATION:   Trauma COMPARISON: Several prior exams, most recently May 16, 2013. FINDINGS: PA and lateral radiographs of the chest demonstrate hyperinflated lungs, without focal pneumonic consolidation, pneumothorax or pleural effusion. Cardiac size and mediastinal contours are stable. Atherosclerotic calcification of the thoracic aorta is present. Pulmonary vascularity appears within normal limits. No acute osseous abnormality identified. IMPRESSION: Hyperinflated lungs without superimposed acute radiographic abnormality.        Medications Reviewed:  Current Facility-Administered Medications   Medication Dose Route Frequency    dextrose 5 % - 0.45% NaCl infusion  75 mL/hr IntraVENous CONTINUOUS    HYDROmorphone (PF) (DILAUDID) injection 0.5 mg  0.5 mg IntraVENous Q4H PRN    ergocalciferol (ERGOCALCIFEROL) capsule 50,000 Units  50,000 Units Oral T3A    folic acid tablet 1 mg  1 mg Oral BID    nicotine (NICODERM CQ) 14 mg/24 hr patch 1 Patch  1 Patch TransDERmal DAILY           Mitali Del Valle DO  Internal Medicine, Hospitalist  Pager: [phone]

## 2017-02-22 NOTE — PROGRESS NOTES
Pt left with OR techs, would not remove dentures, OR techs stated that they would take them out once she got down there. Denture cup sent with pt label on it.

## 2017-02-22 NOTE — PROGRESS NOTES
Patient has designated _______________her husband_________ to participate in his/her discharge plan and to receive any needed information.      Name:   Shayan Jones  spouse   [address] [place] [address], [place], [address] [place] [address]   Feb 22, 2017     Address:  Phone number:

## 2017-02-22 NOTE — PROGRESS NOTES
1945 Received shift report from Katie Butler, GRISEL. Pt lying in bed with no complaints. Bed in lowest position, call bell within reach, will continue to monitor. 2100 Pt complains of pain, but medication not due until 2245. Pt okay to wait. Dimmed lights and helped make the pt comfortable in the meantime. Pt voided in bed pan, valentine in color. Set-up SCDs. Bed in lowest position, call bell within reach, will continue to monitor. 2245 Administered pain medication. Bed in lowest position, call bell within reach, will continue to monitor. 0100 Pt sleeping with no complaints. Bed in lowest position, call bell within reach, will continue to monitor. 0400 Pt complains of pain, administered pain medication. Bed in lowest position, call bell within reach, will continue to monitor. 0530 Pt sleeping with no complaints. Bed in lowest position, call bell within reach, will continue to monitor. Bedside and Verbal shift change report given to Ellen Krishna, [address] [facility], RN (oncoming nurse) by Linda Fontaine RN (offgoing nurse). Report included the following information SBAR, Kardex, Intake/Output, MAR and Recent Results.

## 2017-02-22 NOTE — BRIEF OP NOTE
BRIEF OPERATIVE NOTE    Date of Procedure: 2/22/2017   Preoperative Diagnosis: Left Knee distal femur Comminuted Metaphyseal Intracondylar fracture and Proximal Tibia plateau fracture  Postoperative Diagnosis: same  Procedure(s):    LEFT DISTAL FEMUR OPEN REDUCTION INTERNAL FIXATION Metaphyseal lateral condyle fracture and closed reduction and splinting Tibial plateau fracture  Surgeon(s) and Role:     * Manjit Figueroa MD - Primary            Surgical Staff:  Circ-1: Elizabeth Ayala RN  Circ-Relief: Rajesh Soto  Radiology Technician: Chao Pelayo  Scrub Tech-1: Celina Paige  Surg Asst-1: Emanuel Cosme  Event Time In   Incision Start 1609   Incision Close      Anesthesia: General   Estimated Blood Loss: minimal   Specimens: none  Findings: Distal femur fracture [id number]  Complications: none  Implants:   Implant Name Type Inv.  Item Serial No.  Lot No. LRB No. Used Action   screw   000 CATHIE SCOTT 000 Left 1 Implanted   screw   000 CATHIE SCOTT 000 Left 1 Implanted   4 hole plate distal femur   000 CATHIE SCOTT 000 Left 1 Implanted   screw      Left 1 Implanted                1 Implanted

## 2017-02-22 NOTE — PERIOP NOTES
TRANSFER - IN REPORT:    Verbal report received from GRISEL Valdovinos on [facility]  room 2219   for routine progression of care      Report consisted of patients Situation, Background, Assessment and   Recommendations(SBAR). Information from the following report(s) SBAR was reviewed with the receiving nurse. Opportunity for questions and clarification was provided.       Assessment completed in patients room     No antibiotic orders  Did not wipe paitent's hip- patient in pain  New set of vitals will be done prior to transfer to OR

## 2017-02-22 NOTE — PROGRESS NOTES
Physical Therapy Screening:  Services are indicated once pt is stable from an orthopedic standpoint  and therapy order is required. Dtr present during [place] rounds. Pt limited ambulation w walker PTA. An InBasket screening referral was triggered for physical therapy based on results obtained during the nursing admission assessment. The patients chart was reviewed and the patient is appropriate for a skilled therapy evaluation. Please order a consult for physical therapy if you are in agreement and would like an evaluation to be completed. Thank you.     Shyla England PTA

## 2017-02-22 NOTE — PERIOP NOTES
1721  Patient received in PACU and connected to monitors. Vital signs stable. RN at bedside. Will continue to monitor. 1745  Pt has occasional moist cough, hx smoker. Attempt to suction, pt clamps lips together. Thick clear mucous noted. [address] anesthesia to evaluate pt due to increased coughing. Siria Villalobos 835 given per MAR. Pt remains confused, hx dementia. 1825  Pt breath sounds clear now following neb tx. Anesthesia here to check on pt. Pt resting with eyes closed, no distress noted. [address] to give report, RN will call back \"in 10 mins. \"    1905  Checked pt's temp prior to sending to floor, T 101.6 temporal.  Called anesthesia and rec'd order for Tylenol to give on floor. Chan Gibson CNA, will update RN on arrival to unit.

## 2017-02-22 NOTE — CONSULTS
[facility] - Consult Note    Date of  Admission: 2/21/2017  1:44 PM   Primary Care Physician:  Emir Amaya MD      I saw, evaluated, interviewed and examined the patient personally. I agree with the findings and plan of care as documented below with PARENNY note  Admitted after mechanical fall and LE fracture requring surgery  Currently denies any prior history of CAD or CHF  No symptoms to be concern of unstable coronary syndrome or decompensated heart failure  Chronic LBBB , unchanged EKG since 2013  ECHO today  No fluid overload on exam.   Would be at low-intermediate risk for cardiac complication during surgery    Vci Mckeon MD             Assessment:     - Preoperative risk assessment for distal left femur fracture and left tibial plateau fracture  - Chronic LBBB. Present on prior tracing from 05/2013  - Rheumatoid arthritis  - Osteoporosis  - Tobacco abuse  - Elevated BP, no known history of HTN. Suspect related to severe pain this admission     Plan:     - No contraindication for orthopedic surgery from cardiac standpoint. Patient low to moderate cardiac risk surgery given age and tobacco abuse. Patient's history is limited due to severity of pain, however per history and exam findings, no signs of angina, decompensated CHF, uncontrolled arrhythmia or hemodynamically significant valvular heart disease. Discussed with patient and granddaughter who verbalized understanding.   - Echocardiogram pending     History of Present Illness: This is a 78 y.o. female admitted for Hip fx  hip fracture. Patient complains of:  Severe left lower extremity pain    HPI somewhat limited due to severity of pain and patient with recent narcotic administration. 78year old female with a history of tobacco abuse and rheumatoid arthritis admitted to [facility] for left femur and tibial plateau fracture. She has upcoming orthopedic surgery which is why cardiology is involved in her care.  Patient and granddaughter deny any chest pain, dyspnea, or syncope. Her granddaughter believes that the patient lost her balance when standing up after using the restroom. Denies syncope. No known history of CAD or arrhythmia. She does not see a cardiologist. Her activity at home is limited due to arthritis and loss of balance. She is a daily smoker. Cardiac risk factors: smoking/ tobacco exposure, post-menopausal      Review of Symptoms:  Except as stated above include:  Constitutional:  negative  Respiratory:  negative  Cardiovascular:  Per HPI  Gastrointestinal: negative  Genitourinary:  negative  Musculoskeletal:  +Left femur/tibial plateau fx  Neurological:  Negative  Dermatological:  Negative  Endocrinological: Negative  Psychological:  Negative    A comprehensive review of systems was negative except for that written in the HPI. Past Medical History:     Past Medical History:   Diagnosis Date    Rheumatoid arthritis, adult ([facility] [address]     Tachycardia          Social History:     Social History     Social History    Marital status:      Spouse name: N/A    Number of children: N/A    Years of education: N/A     Social History Main Topics    Smoking status: Current Every Day Smoker     Packs/day: 1.00    Smokeless tobacco: Not on file    Alcohol use No    Drug use: No    Sexual activity: Not on file     Other Topics Concern    Not on file     Social History Narrative        Family History:   No family history on file. Medications:      Allergies   Allergen Reactions    Aspirin Unknown (comments)    Codeine Unknown (comments)        Current Facility-Administered Medications   Medication Dose Route Frequency    dextrose 5 % - 0.45% NaCl infusion  75 mL/hr IntraVENous CONTINUOUS    HYDROmorphone (PF) (DILAUDID) injection 0.5 mg  0.5 mg IntraVENous Q4H PRN    ergocalciferol (ERGOCALCIFEROL) capsule 50,000 Units  50,000 Units Oral G8Z    folic acid tablet 1 mg  1 mg Oral BID    nicotine (NICODERM CQ) 14 mg/24 hr patch 1 Patch  1 Patch TransDERmal DAILY         Physical Exam:     Visit Vitals    /69 (BP 1 Location: Right arm, BP Patient Position: At rest)    Pulse 96    Temp 98 °F (36.7 °C)    Resp 15    Ht 4' 11\" (1.499 m)    Wt 88 lb (39.9 kg)    SpO2 (!) 89%    BMI 17.77 kg/m2     BP Readings from Last 3 Encounters:   02/22/17 168/69   06/19/15 124/55   04/07/15 143/65     Pulse Readings from Last 3 Encounters:   02/22/17 96   06/19/15 73   04/07/15 72     Wt Readings from Last 3 Encounters:   02/22/17 88 lb (39.9 kg)   06/19/15 92 lb (41.7 kg)   04/07/15 94 lb (42.6 kg)       General:  mild distress, appears older than stated age, frail appearing  Neck:  nontender, no JVD  Lungs:  clear to auscultation bilaterally  Heart:  regular rate and rhythm, S1, S2 normal, no murmur, click, rub or gallop  Abdomen:  abdomen is soft without significant tenderness, masses, organomegaly or guarding  Extremities:  Tender to palpation of entire left lower extremity, left leg flexed. No edema right lower extremity  Skin: Warm and dry.  no hyperpigmentation, vitiligo, or suspicious lesions  Neuro: alert, oriented x3, affect appropriate  Psych: non focal     Data Review:     Recent Labs      02/22/17   0330  02/21/17   1540   WBC  9.5  8.1   HGB  12.5  13.4   HCT  37.6  39.5   PLT  110*  112*     Recent Labs      02/22/17   0330  02/21/17   1540   NA  135*  139   K  3.8  4.1   CL  101  105   CO2  23  25   GLU  127*  122*   BUN  12  18   CREA  0.54*  0.71   CA  9.3  9.1   ALB   --   4.1   SGOT   --   28   ALT   --   30       Results for orders placed or performed during the hospital encounter of 02/21/17   EKG, 12 LEAD, INITIAL   Result Value Ref Range    Ventricular Rate 99 BPM    Atrial Rate 99 BPM    P-R Interval 136 ms    QRS Duration 110 ms    Q-T Interval 370 ms    QTC Calculation (Bezet) 474 ms    Calculated P Axis 82 degrees    Calculated R Axis 30 degrees    Calculated T Axis 118 degrees    Diagnosis Normal sinus rhythm Nonspecific ST and T wave abnormality  IVCD of LBBB type. Abnormal ECG  When compared with ECG of 21-FEB-2017 15:37,  No significant change was found  Confirmed by Deirdre Larson ([id number]) on 2/22/2017 11:03:52 AM         All Cardiac Markers in the last 24 hours:  No results found for: CPK, CKMMB, CKMB, RCK3, CKMBT, CKNDX, CKND1, MOSES, TROPT, TROIQ, SATNAM, TROPT, TNIPOC, BNP, BNPP    Last Lipid:  No results found for: CHOL, CHOLX, CHLST, CHOLV, HDL, LDL, DLDL, LDLC, DLDLP, TGL, TGLX, TRIGL, TRIGP, CHHD, CHHDX    Signed By: Mary Tapia     February 22, 2017

## 2017-02-22 NOTE — ROUTINE PROCESS
Patient awake. Admission database reviewed and completed with the patient. The patient did not know her medications. I asked the patient to call her ; she stated her  does not know where to look for her medications.

## 2017-02-22 NOTE — PROGRESS NOTES
Patient unable to sign her own consent, just got dilaudid 15 minutes before I came to see her and somewhat sedated from pain meds. Daughter Vida Stringer at bedside agreed to sign. Mary Hansen spoke with both daughter and patient earlier about surgery and both agreeable to proceed. This procedure has been fully reviewed with the patient and  Her daughter and written informed consent has been obtained.       Fotser Pires PA-C  [facility]

## 2017-02-22 NOTE — ANESTHESIA PREPROCEDURE EVALUATION
Anesthetic History   No history of anesthetic complications            Review of Systems / Medical History  Patient summary reviewed and pertinent labs reviewed    Pulmonary  Within defined limits        Smoker         Neuro/Psych   Within defined limits           Cardiovascular  Within defined limits            CAD    Exercise tolerance: <4 METS     GI/Hepatic/Renal  Within defined limits              Endo/Other  Within defined limits      Arthritis     Other Findings   Comments: Current Smoker? YES       Elective Surgery? Yes       Abstained from smoking 24 hours prior to anesthesia? NO    Risk Factors for Postoperative nausea/vomiting:       History of postoperative nausea/vomiting? NO       Female? YES       Motion sickness? NO       Intended opioid administration for postoperative analgesia?   YES           Physical Exam    Airway  Mallampati: III    Neck ROM: normal range of motion   Mouth opening: Normal     Cardiovascular    Rhythm: regular  Rate: normal         Dental    Dentition: Full lower dentures and Full upper dentures     Pulmonary  Breath sounds clear to auscultation               Abdominal  GI exam deferred       Other Findings            Anesthetic Plan    ASA: 3  Anesthesia type: spinal and general          Induction: Intravenous  Anesthetic plan and risks discussed with: Patient

## 2017-02-23 ENCOUNTER — APPOINTMENT (OUTPATIENT)
Dept: GENERAL RADIOLOGY | Age: 77
DRG: 480 | End: 2017-02-23
Attending: HOSPITALIST
Payer: MEDICARE

## 2017-02-23 LAB — MAGNESIUM SERPL-MCNC: 1.6 MG/DL (ref 1.8–2.4)

## 2017-02-23 PROCEDURE — 97530 THERAPEUTIC ACTIVITIES: CPT

## 2017-02-23 PROCEDURE — 74011250637 HC RX REV CODE- 250/637: Performed by: ORTHOPAEDIC SURGERY

## 2017-02-23 PROCEDURE — 97162 PT EVAL MOD COMPLEX 30 MIN: CPT

## 2017-02-23 PROCEDURE — 74011250636 HC RX REV CODE- 250/636: Performed by: HOSPITALIST

## 2017-02-23 PROCEDURE — 74011250636 HC RX REV CODE- 250/636: Performed by: ORTHOPAEDIC SURGERY

## 2017-02-23 PROCEDURE — 83735 ASSAY OF MAGNESIUM: CPT | Performed by: HOSPITALIST

## 2017-02-23 PROCEDURE — 77030020245 HC SOL INJ 5% D/0.9%NACL

## 2017-02-23 PROCEDURE — 36415 COLL VENOUS BLD VENIPUNCTURE: CPT | Performed by: HOSPITALIST

## 2017-02-23 PROCEDURE — 74011000258 HC RX REV CODE- 258: Performed by: ORTHOPAEDIC SURGERY

## 2017-02-23 PROCEDURE — 74011000258 HC RX REV CODE- 258: Performed by: HOSPITALIST

## 2017-02-23 PROCEDURE — 71010 XR CHEST PORT: CPT

## 2017-02-23 PROCEDURE — 65270000029 HC RM PRIVATE

## 2017-02-23 PROCEDURE — 74011250637 HC RX REV CODE- 250/637: Performed by: HOSPITALIST

## 2017-02-23 RX ORDER — ACETAMINOPHEN 325 MG/1
650 TABLET ORAL
Status: DISCONTINUED | OUTPATIENT
Start: 2017-02-23 | End: 2017-02-27 | Stop reason: HOSPADM

## 2017-02-23 RX ORDER — POTASSIUM CHLORIDE 20 MEQ/1
40 TABLET, EXTENDED RELEASE ORAL
Status: COMPLETED | OUTPATIENT
Start: 2017-02-23 | End: 2017-02-23

## 2017-02-23 RX ORDER — DEXTROSE MONOHYDRATE AND SODIUM CHLORIDE 5; .9 G/100ML; G/100ML
100 INJECTION, SOLUTION INTRAVENOUS CONTINUOUS
Status: DISPENSED | OUTPATIENT
Start: 2017-02-23 | End: 2017-02-24

## 2017-02-23 RX ORDER — HYDROMORPHONE HYDROCHLORIDE 1 MG/ML
0.2 INJECTION, SOLUTION INTRAMUSCULAR; INTRAVENOUS; SUBCUTANEOUS
Status: DISCONTINUED | OUTPATIENT
Start: 2017-02-23 | End: 2017-02-27 | Stop reason: HOSPADM

## 2017-02-23 RX ADMIN — ACETAMINOPHEN 650 MG: 325 TABLET, FILM COATED ORAL at 22:28

## 2017-02-23 RX ADMIN — HYDROMORPHONE HYDROCHLORIDE 0.2 MG: 1 INJECTION, SOLUTION INTRAMUSCULAR; INTRAVENOUS; SUBCUTANEOUS at 23:45

## 2017-02-23 RX ADMIN — Medication 5 ML: at 22:00

## 2017-02-23 RX ADMIN — ENOXAPARIN SODIUM 40 MG: 40 INJECTION SUBCUTANEOUS at 10:11

## 2017-02-23 RX ADMIN — ACETAMINOPHEN 650 MG: 325 TABLET, FILM COATED ORAL at 09:28

## 2017-02-23 RX ADMIN — HYDROMORPHONE HYDROCHLORIDE 0.2 MG: 1 INJECTION, SOLUTION INTRAMUSCULAR; INTRAVENOUS; SUBCUTANEOUS at 11:32

## 2017-02-23 RX ADMIN — DEXTROSE MONOHYDRATE AND SODIUM CHLORIDE 100 ML/HR: 5; .9 INJECTION, SOLUTION INTRAVENOUS at 12:30

## 2017-02-23 RX ADMIN — FOLIC ACID TAB 400 MCG 1 MG: 400 TAB at 09:33

## 2017-02-23 RX ADMIN — Medication 10 ML: at 18:02

## 2017-02-23 RX ADMIN — POTASSIUM CHLORIDE 40 MEQ: 20 TABLET, EXTENDED RELEASE ORAL at 09:33

## 2017-02-23 RX ADMIN — DOCUSATE SODIUM 100 MG: 100 CAPSULE, LIQUID FILLED ORAL at 09:33

## 2017-02-23 RX ADMIN — OXYCODONE HYDROCHLORIDE AND ACETAMINOPHEN 1 TABLET: 7.5; 325 TABLET ORAL at 20:09

## 2017-02-23 RX ADMIN — CEFAZOLIN SODIUM 1 G: 1 INJECTION, POWDER, FOR SOLUTION INTRAMUSCULAR; INTRAVENOUS at 05:47

## 2017-02-23 NOTE — PROGRESS NOTES
physical Therapy TREATMENT    Patient: Noemi Bennett (13 y.o. female)  Date: 2/23/2017  Diagnosis: Hip fx  hip fracture  Hip fracture (HCC) <principal problem not specified>  Procedure(s) (LRB):    LEFT DISTAL FEMUR OPEN REDUCTION INTERNAL FIXATION (Left) 1 Day Post-Op  Precautions: Fall  Chart, physical therapy assessment, plan of care and goals were reviewed. ASSESSMENT:  Patient seen for PT treatment following evaluation this morning. Patient greeted supine in bed with HOB in elevated position on bed pa, no apparent distress. PT assisted nurse in helping patient off of bed pan. Patient required maxA to roll in each direction to clean. Patient required maxA x 2 to moved supine to sitting EOB. Once sitting EOB, patient demo very strong posterior lean requiring maxA to maintain from falling backward. Despite max VC, patient unable to lean anteriorly. PT with assist from rehab technician, attempted to perform sit to stand with patient however deemed unsafe d/t significant posterior lean and reluctance to bear weight on RLE. Patient required total assist x 2 to move back into supine position. Time spent explaining to patient role of therapy and need for active participation in order to progress. Patient was positioned for comfort with LLE propped in neutral position on pillow, call bell in reach, nursing notified of PT attempt to transfer/ambulate. Progression toward goals:  []      Improving appropriately and progressing toward goals  [x]      Improving slowly and progressing toward goals  [x]      Not making progress toward goals and plan of care will be adjusted     PLAN:  Patient continues to benefit from skilled intervention to address the above impairments. Continue treatment per established plan of care. Discharge Recommendations:  Inpatient Rehab versus Skilled Nursing Facility  Further Equipment Recommendations for Discharge:  rolling walker     SUBJECTIVE:   Patient stated I can't.     OBJECTIVE DATA SUMMARY:   Critical Behavior:  Neurologic State: Drowsy  Functional Mobility Training:  Bed Mobility:  Rolling: Maximum assistance;Assist x2; Additional time  Supine to Sit: Assist x2; Additional time;Maximum assistance  Sit to Supine: Total assistance  Scooting: Maximum assistance;Assist x2     Interventions: Verbal cues; Tactile cues; Safety awareness training  Transfers:  Sit to Stand: Maximum assistance;Assist x2 (Attempted but unable to complete)  Balance:  Sitting: Impaired; With support  Sitting - Static: Poor (constant support)  Sitting - Dynamic: Poor (constant support)  Standing:  (Unable)  Ambulation/Gait Training:  Pain:  Pain Scale 1: Numeric (0 - 10)  Pain Intensity 1: 7  Pain Location 1: Knee  Pain Description 1: Sharp  Pain Intervention(s) 1: Medication (see MAR)  Activity Tolerance:   Significantly decreased  Please refer to the flowsheet for vital signs taken during this treatment.   After treatment:   [] Patient left in no apparent distress sitting up in chair  [x] Patient left in no apparent distress in bed  [x] Call bell left within reach  [x] Nursing notified  [] Caregiver present  [] Bed alarm activated      Hiren Browne DPT   Time Calculation: 25 mins

## 2017-02-23 NOTE — PERIOP NOTES
TRANSFER - OUT REPORT:    Verbal report given to 960 Brandon Huang RN(name) on Farhan Morris  being transferred to 2200(unit) for routine post - op       Report consisted of patients Situation, Background, Assessment and   Recommendations(SBAR). Information from the following report(s) SBAR, Kardex, OR Summary, Intake/Output, MAR and Recent Results was reviewed with the receiving nurse. Lines:   Peripheral IV 02/21/17 Left Antecubital (Active)   Site Assessment Clean, dry, & intact 2/22/2017  5:21 PM   Phlebitis Assessment 0 2/22/2017  5:21 PM   Infiltration Assessment 0 2/22/2017  5:21 PM   Dressing Status Clean, dry, & intact 2/22/2017  5:21 PM   Dressing Type Transparent;Tape 2/22/2017  5:21 PM   Hub Color/Line Status Flushed;Green; Infusing 2/22/2017  5:21 PM   Action Taken Open ports on tubing capped 2/22/2017  5:21 PM   Alcohol Cap Used Yes 2/22/2017  5:21 PM        Opportunity for questions and clarification was provided.       Patient transported with:   [employer]

## 2017-02-23 NOTE — PROGRESS NOTES
Internal Medicine Progress Note    Patient's Name: Seth Gao  Admit Date: 2/21/2017  Length of Stay: 1      Assessment/Plan     #Acute L Distal Femur Fracture s/p Open Reduction and Internal Fixation   #Acute L Tibial Plateau Fracture  #Post-op Fever  #Acute Metabolic Encephalopathy, Possible Delirium  #Chronic Diastolic CHF  #Arthritis  #Osteoporosis  #Tobacco Use  #DVT PPx     - Ortho following. Pain control PRN. PT/OT. - UA negative for infection. Likely from atelectasis, but will check CXR. Cont incentive spirometry. Start basic fluids  - Monitor for signs of volume overload  - Cont vit d  - Tobacco cessation discussion. Nicotine patch  - Lovenox qD    I have personally reviewed all pertinent labs and films that have officially resulted over the last 24 hours. I have personally checked for all pending labs that are awaiting final results.     Subjective     Pt s/e @ bedside  Pt confused this morning  Afebrile overnight with Tmax of 101.6  Pt c/o LLE pain  Denies CP or SOB    Objective     Visit Vitals    /69 (BP 1 Location: Left arm)    Pulse (!) 114    Temp 99.8 °F (37.7 °C)    Resp 18    Ht 4' 11\" (1.499 m)    Wt 39.9 kg (88 lb)    SpO2 96%    BMI 17.77 kg/m2       Physical Exam:  General Appearance: NAD, resting in bed  Lungs: Decreased BS, no wheezing  CV: Tachycardic w/ RR, no m/r/g  Abdomen: soft, non-tender, normal bowel sounds  Extremities: no cyanosis, no peripheral edema  Neuro: AA&Ox1, moves all ext    Lab/Data Reviewed:  BMP:   Lab Results   Component Value Date/Time     (L) 02/22/2017 09:16 PM    K 3.2 (L) 02/22/2017 09:16 PM     02/22/2017 09:16 PM    CO2 25 02/22/2017 09:16 PM    AGAP 9 02/22/2017 09:16 PM     (H) 02/22/2017 09:16 PM    BUN 11 02/22/2017 09:16 PM    CREA 0.76 02/22/2017 09:16 PM    GFRAA >60 02/22/2017 09:16 PM    GFRNA >60 02/22/2017 09:16 PM     CBC:   Lab Results   Component Value Date/Time    HGB 11.4 (L) 02/22/2017 09:16 PM    HCT 33.9 (L) 02/22/2017 09:16 PM       Imaging Reviewed:  Donnia Sandhoff Technologist Service    Result Date: 2/23/2017  Fluoroscopy was used during surgery for this procedure under the supervision of the attending surgeon. Start Time:     1530 hours End Time:      1650 hours # of Images:  3       IMPRESSION:   Administrative report.   KL      Medications Reviewed:  Current Facility-Administered Medications   Medication Dose Route Frequency    HYDROmorphone (PF) (DILAUDID) injection 0.2 mg  0.2 mg IntraVENous Q4H PRN    acetaminophen (TYLENOL) tablet 650 mg  650 mg Oral Q4H PRN    dextrose 5 % - 0.45% NaCl infusion  75 mL/hr IntraVENous CONTINUOUS    lactated ringers infusion  75 mL/hr IntraVENous CONTINUOUS    sodium chloride (NS) flush 5-10 mL  5-10 mL IntraVENous Q8H    sodium chloride (NS) flush 5-10 mL  5-10 mL IntraVENous PRN    sodium chloride (NS) flush 5-10 mL  5-10 mL IntraVENous Q8H    sodium chloride (NS) flush 5-10 mL  5-10 mL IntraVENous PRN    oxyCODONE-acetaminophen (PERCOCET 7.5) 7.5-325 mg per tablet 1 Tab  1 Tab Oral Q4H PRN    naloxone (NARCAN) injection 0.4 mg  0.4 mg IntraVENous PRN    promethazine (PHENERGAN) with saline injection 12.5 mg  12.5 mg IntraVENous Q6H PRN    docusate sodium (COLACE) capsule 100 mg  100 mg Oral BID    enoxaparin (LOVENOX) injection 40 mg  40 mg SubCUTAneous DAILY    sodium chloride (NS) flush 5-10 mL  5-10 mL IntraVENous Q8H    sodium chloride (NS) flush 5-10 mL  5-10 mL IntraVENous PRN    acetaminophen (TYLENOL) suppository 975 mg  975 mg Rectal PRN    ergocalciferol (ERGOCALCIFEROL) capsule 50,000 Units  50,000 Units Oral N7Y    folic acid tablet 1 mg  1 mg Oral BID    nicotine (NICODERM CQ) 14 mg/24 hr patch 1 Patch  1 Patch TransDERmal DAILY           Kristy Mayorga DO  Internal Medicine, Hospitalist  Pager: [phone]  [address] [place] [address]

## 2017-02-23 NOTE — ROUTINE PROCESS
TRANSFER - IN REPORT:    Verbal report received from [facility]) on Noemi Bennett  being received from [employer]) for routine post - op      Report consisted of patients Situation, Background, Assessment and   Recommendations(SBAR). Information from the following report(s) SBAR, Kardex, OR Summary, Intake/Output, MAR and Recent Results was reviewed with the receiving nurse. Opportunity for questions and clarification was provided. Assessment completed upon patients arrival to unit and care assumed.

## 2017-02-23 NOTE — PROGRESS NOTES
Spoke to the patient and her daughter, provided a SNF list.  Agreed to matching to [facility] SNF's determine who will accept and will make a decision based on accepting facilities.  Catracho Chung RN

## 2017-02-23 NOTE — PROGRESS NOTES
1910 Received shift report from [facility]. Pt lying in bed with no complaints. Pt just arrived to unit and it very drowsy. Family at bedside, bed in lowest position, call bell within reach. 2015 Pt very drowsy and complains of pain. Pt too drowsy for pain medication, administered Tylenol. Pt also too drowsy to teach on ICS. Pt's lungs are diminished, King is putting out bright red urine. Notified the MD about the urine. MD ordered UA and BMP.    0100 Called MD to discuss results of UA and BMP. Due to pt's urine lightening in color we will monitor at this point. Emptied King (red urine, but pink in tubing) & emptied BRODIE drain (seriousanginous). Pt sleeping with no complaints. Bed in lowest position, call bell within reach, will continue to monitor. 0530 King putting out pink urine. Emptied King and BRODIE drain. Bedside and Verbal shift change report given to Mira Taylor RN (oncoming nurse) by Maudry Castleman, RN (offgoing nurse). Report included the following information SBAR, Kardex, Intake/Output, MAR and Recent Results.

## 2017-02-23 NOTE — PROGRESS NOTES
0900: Pt resting in bed. Spouse at bedside. Knee brace in place to LLE. BRODIE drain empty at this time. Drained 10 cc overnight. PT at bedside. KOLE Arceo at bedside discussing plan of care. Pt denies needs. Plans for d/c to SNF later this week.     -Orthopedic

## 2017-02-23 NOTE — PROGRESS NOTES
Patient without new complaints, status post FEMUR OPEN REDUCTION INTERNAL FIXATION for Hip fx  hip fracture  Hip fracture ([facility] [address]       Visit Vitals    /69 (BP 1 Location: Left arm)    Pulse (!) 114    Temp 99.8 °F (37.7 °C)    Resp 18    Ht 4' 11\" (1.499 m)    Wt 39.9 kg (88 lb)    SpO2 96%    BMI 17.77 kg/m2       CBC w/Diff    Lab Results   Component Value Date/Time    WBC 9.5 02/22/2017 03:30 AM    RBC 3.62 (L) 02/22/2017 03:30 AM    HCT 33.9 (L) 02/22/2017 09:16 PM    .9 (H) 02/22/2017 03:30 AM    MCH 34.5 (H) 02/22/2017 03:30 AM    MCHC 33.2 02/22/2017 03:30 AM    RDW 13.6 02/22/2017 03:30 AM    Lab Results   Component Value Date/Time    MONOS 9 02/22/2017 03:30 AM    EOS 0 02/22/2017 03:30 AM    BASOS 0 02/22/2017 03:30 AM    RDW 13.6 02/22/2017 03:30 AM          Physical exam: aaox3, surgical dressing dry, drain in place , no output, immobilizer in place, bilateral anterior tibialis and gastrocnemius strength 5/5 , moving all toes,  brisk capillary refill all nail beds, palpable distal pulses, sensation intact,  BLE compartments soft  and nontender. Assessment:  Status post FEMUR OPEN REDUCTION INTERNAL FIXATION for Hip fx  hip fracture  Hip fracture (HCC) ,  progressing.     PLAN:  Mobilize with P.T. NWB LLE   DVT ppx-lovenox   Drain with minimal output, plan to d/c today   Discharge KOLE Boo  February 23, 2017

## 2017-02-23 NOTE — CDMP QUERY
Please clarify if this patient is being treated/managed for:    => Underweight based on BMI of 17.77   (4'11\",  88 lbs)  =>Other Explanation of clinical findings  =>Unable to Determine (no explanation of clinical findings)    Nutrition Diagnoses:   Underweight related to poor appetite as evidenced by BMI of 17.8. Nutrition Problems Identified:  Suboptimal PO intake   Continue inpatient monitoring and intervention. Please clarify and document your clinical opinion in the progress notes and discharge summary.       Thank you,    Bambi Fitzgerald RN, [phone] [phone]

## 2017-02-23 NOTE — OP NOTES
Chris Austin    Name:  Romeo Harris  MR#:  [id number]  :  1940  Account #:  [de-identified]  Date of Adm:  2017  Date of Surgery:  2017      PREOPERATIVE DIAGNOSIS: Left knee distal femur comminuted  metaphyseal intercondylar fracture and proximal tibial plateau fracture. POSTOPERATIVE DIAGNOSIS: Left knee distal femur comminuted  metaphyseal intercondylar fracture and proximal tibial plateau fracture  with lateral condylar fracture. PROCEDURES PERFORMED: Left distal femur open reduction and  internal fixation metaphyseal intercondylar and lateral condylar fracture  with comminution and closed reduction and splinting tibial plateau  fracture. SURGEON: Davon Henriquez MD.    ANESTHESIA: General.    ESTIMATED BLOOD LOSS: Minimal.    SPECIMENS REMOVED: None. FINDINGS: Distal femur fracture. COMPLICATIONS: None. IMPLANTS: [employer] 4-hole distal femur lateral locking  plate with multiple locking screws and compression screws. INDICATIONS FOR PROCEDURE: The patient is a pleasant, 73-year-  old white female with history of a fall earlier today, was found to have a  left knee distal femur fracture. She was brought to the operative suite  for definitive management once full informed consent was obtained. Risks, benefits explained to her and her granddaughter in full including,  but not limited to bleeding, infection, nerve damage, pain, stiffness,  swelling, further surgery, revision surgery, incomplete resolution of  symptoms, malunion, nonunion, incomplete healing, possible hardware  removal, medical complications, DJD, possible loss of limb. DESCRIPTION OF PROCEDURE: The patient was brought to the  operative suite, placed on the table in supine position. She was  intubated with general endotracheal anesthesia. Proceeded to prep  and drape her left leg in normal sterile fashion. King was placed prior  to this.  After this was done, we proceeded to place a sterile tourniquet  proximally. It was inflated to 250 mmHg for a total of 1 hour and 10  minutes. After this was done, we proceeded to make a standard lateral  incision directly over the IT band. Significant instability of the fracture  was appreciated laterally with notable valgus malalignment. After this  was done, we proceeded to make an incision sitting just over the  anterior aspect of the lateral fibula curvilinear over the lateral IT band  and extending proximally. We dissected down, identified IT band. A  split was made in the central portion of the IT band. We proceeded to  cauterize any bleeders. We dissected down to the femur. We identified  the fracture at the lateral aspect of the femur. It was found to have  notable comminution with essentially complete displacement and  lateral condylar fragment at the area of the metaphyseal diaphyseal  junction. A coronal split was identified anteriorly as well as the  intercondylar split within the body of the femur. The medial condyle  was found to be grossly intact. Comminution in and around the area of  the fracture was noted with fragmentation of the bone. After this was  done, we proceeded to place traction on the knee and varus alignment. Under fluoroscopic imaging, we proceeded to reduce the fracture  provisionally with traction under fluoroscopic imaging in AP and lateral  planes. After this was done, we proceeded to then utilize a plate as a  template, essentially a 4-hole John lateral locking plate to mold the  distal femoral component of the distal femoral fracture to both distally  and proximally. K-wires used secure the bone fragments to the plate  essentially acting as an internal and external fixator device temporarily. After this was done, we proceeded to check this once again. On  fluoroscopic imaging it was found  to be of grossly adequate alignment.  Notable loss of bone was  appreciated within the lateral femoral condylar aspect, such, we  decided at the end of the case to utilize the HydroSet bone graft to fill  the void. After this was done, we proceeded to secure the plate  proximally on the femur with compression screws x2. Two secondary  locking screws were placed as well to secure the plate otherwise  based on the soft bone. After this was done, we proceeded to then do  an anterior to posterior pin to secure the coronal split. Secondarily  we proceeded to place a compression screw across the plate from  lateral to medial to compress the intercondylar split portion of the knee. Adequate reduction overall was appreciated both in AP and lateral  planes. We then proceeded to place multiple locking screws. We did  not use the distal locking screws, as we were concerned these were a  little bit too close to the articular cartilage. An anterior locking screw  was placed to secure the coronal split adequately. No residual  significant instability of the fracture was appreciated. We proceeded to  then copiously irrigate the wound. A secondary drill hole was placed  proximally. This was placed in the area of the void of cancellous bone  within the metaphyseal flare; 5 mL of the Hydroset bone graft was placed to  secure the fracture fragments and stabilize the cancellous bone. This  was found to harden adequately with overall increased stability of the  distal femur. We proceeded to then copiously irrigate the knee. Final  pictures were taken. Knee was found to have adequate range of  motion 0-100 degrees without any instability and adequate alignment  grossly. K-wires were removed. We proceeded to then copiously  irrigate the knee. Bleeders were cauterized. The fascia was closed  loosely with a 0 Vicryl stitch. The IT band was closed as well. No  undue tightness appreciated. A drain was placed in the subcutaneous  tissue and fascia and exited distally.  We proceeded to then close  subcutaneous tissue with 2-0 Vicryl stitch. The skin was closed with  staples. Dry sterile dressing was placed with a well-padded dressing  and a knee immobilizer. Tourniquet was taken down. She was awoke  from anesthesia. She was brought to postop care in stable condition. ADDENDUM    DESCRIPTION OF PROCEDURE: We did identify the tibial plateau  fracture at the proximal aspect of the tibia. It was found to be mildly  unstable. It was thought that there was mild compression over this  area. There was some very mild comminution. No indication for  definitive plate fixation or screw fixation was appreciated. As such, we  proceeded to close the fascia over this area tightly In concert with the  IT band repair of the proximal femur. The tibial plateau fracture was  subsequently splinted in concert with the splinting of the lateral femoral  condyle fracture.             AMENDED REPORT:  02/23/2017 added job #[id number]/ANGELIC Hansen MD    BB / MS1  D:  02/22/2017   17:00  T:  02/22/2017   21:20  Job #:  [id number]

## 2017-02-23 NOTE — PROGRESS NOTES
0710 - received pt in room. Pt is resting in bed. Confused. Pt rates pain to left leg 0/10.    184 - assessment completed. Pt is pleasantly confused, able to verify name and . 08 - Dr. Colt Hernandez rounding in room and speaking with pt., attempting to encourage pt to use her ICS. Aware of pt's confusion, received order to reduce amount of pain medication. I had advised him that pt was last medicated on the previous evening. Will continue to monitor. 1430 - assisted pt up on bedpan. [phone] - observed PT working with pt in room. 1 - observed daughter at bedside. Assisting pt with lunch. Pt tolerating well.  - removed BRODIE drain per KOLE K[facility]. Pt tolerated well. Pt has become confused and only responding to her name being called and pain stimuli.  - attempted to give pt her evening medications, pt refused to swallow, kept sucking and the pill as if it were a straw.  - contacted Dr. Hope Lennon per Mews score 7. Advised her that pt's mental status is as it was this morning, however, it improved throughout the day. Dr. Hope Lennon stated that the pt is probably in pain. Advised to try and give po pain med and contact her if condition changes. Will have evening nurse continue to monitor condition.

## 2017-02-23 NOTE — PROGRESS NOTES
Problem: Mobility Impaired (Adult and Pediatric)  Goal: *Acute Goals and Plan of Care (Insert Text)  Physical Therapy Goals  Initiated 2/23/2017 and to be accomplished within 7 day(s)  1. Patient will move from supine to sit and sit to supine in bed with moderate assistance . 2. Patient will transfer from bed to chair and chair to bed with minimal assistance using the least restrictive device. 3. Patient will perform sit to stand with minimal assistance/contact guard assist.  4. Patient will ambulate with minimal assistance/contact guard assist for 25 feet with the least restrictive device. Outcome: Progressing Towards Goal  PHYSICAL THERAPY EVALUATION     Patient: Seth Gao (48 y.o. female)  Date: 2/23/2017  Primary Diagnosis: Hip fx  hip fracture  Hip fracture (HCC)  Procedure(s) (LRB):    LEFT DISTAL FEMUR OPEN REDUCTION INTERNAL FIXATION (Left) 1 Day Post-Op   Precautions: NWB LLE,  Fall      ASSESSMENT :  Based on the objective data described below, the patient presents with LLE pain, dec LE strength, significantly decreased mobility including impaired bed mobility, transfers, gait and general activity tolerance. PT evaluated patient today, subjective information gathered from patient's  as patient would not respond to PT questions. PT with intention to mobilize and provide gait training, however patient deemed unsafe to attempt at this time. She required max(A) x 2 supine <> sit transfer, very poor sitting balance with strong posterior lean requiring max(A) x 2 to stabilize and maintain sitting balance. Patient was transferred max(A) back to supine position. Education provided to patient's  and nursing regarding LLE positioning. Patient will benefit from skilled intervention to address the above impairments.   Patients rehabilitation potential is considered to be Fair  Factors which may influence rehabilitation potential include:   [ ]         None noted  [X]         Mental ability/status  [X]         Medical condition  [ ]         Home/family situation and support systems  [X]         Safety awareness  [X]         Pain tolerance/management  [ ]         Other:      Recommendations for nursing: Positioning LLE in hip neutral position  Written on communication board: Communicated with nurse  Verbally communicated to: nurse       PLAN :  Recommendations and Planned Interventions:  [X]           Bed Mobility Training             [X]    Neuromuscular Re-Education  [X]           Transfer Training                   [ ]    Orthotic/Prosthetic Training  [X]           Gait Training                          [X]    Modalities  [X]           Therapeutic Exercises          [X]    Edema Management/Control  [X]           Therapeutic Activities            [X]    Patient and Family Training/Education  [ ]           Other (comment):     Frequency/Duration: Patient will be followed by physical therapy twice daily to address goals. Discharge Recommendations: Inpatient Rehab vs Skilled Nursing Facility  Further Equipment Recommendations for Discharge: rolling walker and N/A       SUBJECTIVE:   Patient stated No.      OBJECTIVE DATA SUMMARY:       Past Medical History:   Diagnosis Date    [facility]umatoid arthritis, adult ([facility] [address]      Tachycardia       Past Surgical History:   Procedure Laterality Date    HX CAROTID ENDARTERECTOMY         left    HX CATARACT REMOVAL         bilateral    HX HIP REPLACEMENT         left     Barriers to Learning/Limitations: yes;  altered mental status (i.e.Sedation, Confusion)  Compensate with: visual, verbal, tactile, kinesthetic cues/model     G CODE:Mobility  Current  CM= 80-99%   Goal  CL= 60-79%.   The severity rating is based on the Other Functional Assessment     Eval Complexity: History: HIGH Complexity :3+ comorbidities / personal factors will impact the outcome/ POC Exam:MEDIUM Complexity : 3 Standardized tests and measures addressing body structure, function, activity limitation and / or participation in recreation  Presentation: MEDIUM Complexity : Evolving with changing characteristics  Clinical Decision Making:Medium Complexity Functional Assessment Overall Complexity:MEDIUM     Prior Level of Function/Home Situation:   Home Situation  Home Environment: Private residence  # Steps to Enter: 3  Rails to Enter: Yes  Hand Rails : Bilateral  One/Two Story Residence: Two story, live on 1st floor  # of Interior Steps: 12  Interior Rails: Right  Living Alone: No ()  Support Systems: Spouse/Significant Other/Partner  Patient Expects to be Discharged to[de-identified] Rehabilitation facility  Current DME Used/Available at Home: Wheelchair, Walker, rollator  Critical Behavior:  Neurologic State: Drowsy  Strength:    Strength: Grossly decreased, non-functional  Tone & Sensation:   Tone: Abnormal  Sensation: Intact  Range Of Motion:  AROM: Grossly decreased, non-functional  PROM:  (RLE WNL, LLE NT)  Functional Mobility:  Bed Mobility:  Supine to Sit: Maximum assistance;Assist x2  Sit to Supine: Assist x2;Maximum assistance  Scooting: Maximum assistance;Assist x2  Transfers:  Balance:   Sitting: Impaired; With support  Sitting - Static: Poor (constant support)  Sitting - Dynamic: Not tested  Standing:  (NT)  Ambulation/Gait Training:  Pre treatment pain level:  Post treatment pain level:  Pain Scale 1: Numeric (0 - 10)  Pain Intensity 1: 2  Pain Location 1: Leg  Pain Orientation 1: Left  Pain Description 1: Aching  Pain Intervention(s) 1: Position  Activity Tolerance:   Significantly decreased  Please refer to the flowsheet for vital signs taken during this treatment.   After treatment:   [ ]         Patient left in no apparent distress sitting up in chair  [X]         Patient left in no apparent distress in bed  [X]         Call bell left within reach  [X]         Nursing notified  [X]         Caregiver present  [ ]         Bed alarm activated      COMMUNICATION/EDUCATION:   [ ] Fall prevention education was provided and the patient/caregiver indicated understanding. [X]         Patient/family have participated as able in goal setting and plan of care. [ ]         Patient/family agree to work toward stated goals and plan of care. [ ]         Patient understands intent and goals of therapy, but is neutral about his/her participation. [ ]         Patient is unable to participate in goal setting and plan of care.      Thank you for this referral.  Taylor Schultz, EMIGDIOT   Time Calculation: 19 mins

## 2017-02-24 LAB
ANION GAP BLD CALC-SCNC: 8 MMOL/L (ref 3–18)
BASOPHILS # BLD AUTO: 0 K/UL (ref 0–0.06)
BASOPHILS # BLD: 0 % (ref 0–2)
BUN SERPL-MCNC: 12 MG/DL (ref 7–18)
BUN/CREAT SERPL: 19 (ref 12–20)
CALCIUM SERPL-MCNC: 7.9 MG/DL (ref 8.5–10.1)
CHLORIDE SERPL-SCNC: 110 MMOL/L (ref 100–108)
CO2 SERPL-SCNC: 24 MMOL/L (ref 21–32)
CREAT SERPL-MCNC: 0.62 MG/DL (ref 0.6–1.3)
DIFFERENTIAL METHOD BLD: ABNORMAL
EOSINOPHIL # BLD: 0 K/UL (ref 0–0.4)
EOSINOPHIL NFR BLD: 0 % (ref 0–5)
ERYTHROCYTE [DISTWIDTH] IN BLOOD BY AUTOMATED COUNT: 14.4 % (ref 11.6–14.5)
GLUCOSE SERPL-MCNC: 127 MG/DL (ref 74–99)
HCT VFR BLD AUTO: 33 % (ref 35–45)
HGB BLD-MCNC: 10.7 G/DL (ref 12–16)
LYMPHOCYTES # BLD AUTO: 11 % (ref 21–52)
LYMPHOCYTES # BLD: 1 K/UL (ref 0.9–3.6)
MCH RBC QN AUTO: 35 PG (ref 24–34)
MCHC RBC AUTO-ENTMCNC: 32.4 G/DL (ref 31–37)
MCV RBC AUTO: 107.8 FL (ref 74–97)
MONOCYTES # BLD: 1.7 K/UL (ref 0.05–1.2)
MONOCYTES NFR BLD AUTO: 18 % (ref 3–10)
NEUTS SEG # BLD: 6.9 K/UL (ref 1.8–8)
NEUTS SEG NFR BLD AUTO: 71 % (ref 40–73)
PLATELET # BLD AUTO: 76 K/UL (ref 135–420)
PMV BLD AUTO: 11.9 FL (ref 9.2–11.8)
POTASSIUM SERPL-SCNC: 3.4 MMOL/L (ref 3.5–5.5)
RBC # BLD AUTO: 3.06 M/UL (ref 4.2–5.3)
SODIUM SERPL-SCNC: 142 MMOL/L (ref 136–145)
WBC # BLD AUTO: 9.7 K/UL (ref 4.6–13.2)

## 2017-02-24 PROCEDURE — 97530 THERAPEUTIC ACTIVITIES: CPT

## 2017-02-24 PROCEDURE — 77030020253 HC SOL INJ D545NS .05 DEX .45 SAL

## 2017-02-24 PROCEDURE — 74011250636 HC RX REV CODE- 250/636: Performed by: HOSPITALIST

## 2017-02-24 PROCEDURE — 74011250637 HC RX REV CODE- 250/637: Performed by: ORTHOPAEDIC SURGERY

## 2017-02-24 PROCEDURE — 74011000258 HC RX REV CODE- 258: Performed by: HOSPITALIST

## 2017-02-24 PROCEDURE — 74011250637 HC RX REV CODE- 250/637: Performed by: HOSPITALIST

## 2017-02-24 PROCEDURE — 80048 BASIC METABOLIC PNL TOTAL CA: CPT | Performed by: HOSPITALIST

## 2017-02-24 PROCEDURE — 74011250636 HC RX REV CODE- 250/636: Performed by: ORTHOPAEDIC SURGERY

## 2017-02-24 PROCEDURE — 65270000029 HC RM PRIVATE

## 2017-02-24 PROCEDURE — 77030020245 HC SOL INJ 5% D/0.9%NACL

## 2017-02-24 PROCEDURE — 85025 COMPLETE CBC W/AUTO DIFF WBC: CPT | Performed by: HOSPITALIST

## 2017-02-24 PROCEDURE — 74011000250 HC RX REV CODE- 250: Performed by: HOSPITALIST

## 2017-02-24 PROCEDURE — 36415 COLL VENOUS BLD VENIPUNCTURE: CPT | Performed by: HOSPITALIST

## 2017-02-24 PROCEDURE — 77030029684 HC NEB SM VOL KT MONA -A

## 2017-02-24 RX ORDER — POTASSIUM CHLORIDE 7.45 MG/ML
10 INJECTION INTRAVENOUS
Status: COMPLETED | OUTPATIENT
Start: 2017-02-24 | End: 2017-02-24

## 2017-02-24 RX ORDER — POTASSIUM CHLORIDE 20 MEQ/1
40 TABLET, EXTENDED RELEASE ORAL
Status: DISCONTINUED | OUTPATIENT
Start: 2017-02-24 | End: 2017-02-24

## 2017-02-24 RX ORDER — MAGNESIUM SULFATE HEPTAHYDRATE 40 MG/ML
2 INJECTION, SOLUTION INTRAVENOUS
Status: COMPLETED | OUTPATIENT
Start: 2017-02-24 | End: 2017-02-24

## 2017-02-24 RX ORDER — ONDANSETRON 8 MG/1
4 TABLET, ORALLY DISINTEGRATING ORAL
Status: DISCONTINUED | OUTPATIENT
Start: 2017-02-24 | End: 2017-02-27 | Stop reason: HOSPADM

## 2017-02-24 RX ORDER — IPRATROPIUM BROMIDE AND ALBUTEROL SULFATE 2.5; .5 MG/3ML; MG/3ML
3 SOLUTION RESPIRATORY (INHALATION)
Status: DISCONTINUED | OUTPATIENT
Start: 2017-02-24 | End: 2017-02-27 | Stop reason: HOSPADM

## 2017-02-24 RX ADMIN — Medication 10 ML: at 13:32

## 2017-02-24 RX ADMIN — IPRATROPIUM BROMIDE AND ALBUTEROL SULFATE 3 ML: .5; 2.5 SOLUTION RESPIRATORY (INHALATION) at 10:30

## 2017-02-24 RX ADMIN — FOLIC ACID TAB 400 MCG 1 MG: 400 TAB at 08:42

## 2017-02-24 RX ADMIN — Medication 5 ML: at 22:00

## 2017-02-24 RX ADMIN — POTASSIUM CHLORIDE 10 MEQ: 10 INJECTION, SOLUTION INTRAVENOUS at 10:30

## 2017-02-24 RX ADMIN — OXYCODONE HYDROCHLORIDE AND ACETAMINOPHEN 1 TABLET: 7.5; 325 TABLET ORAL at 16:17

## 2017-02-24 RX ADMIN — DEXTROSE MONOHYDRATE AND SODIUM CHLORIDE 100 ML/HR: 5; .9 INJECTION, SOLUTION INTRAVENOUS at 10:30

## 2017-02-24 RX ADMIN — POTASSIUM CHLORIDE 10 MEQ: 10 INJECTION, SOLUTION INTRAVENOUS at 14:30

## 2017-02-24 RX ADMIN — DEXTROSE MONOHYDRATE AND SODIUM CHLORIDE 100 ML/HR: 5; .9 INJECTION, SOLUTION INTRAVENOUS at 00:05

## 2017-02-24 RX ADMIN — POTASSIUM CHLORIDE 10 MEQ: 10 INJECTION, SOLUTION INTRAVENOUS at 13:29

## 2017-02-24 RX ADMIN — POTASSIUM CHLORIDE 10 MEQ: 10 INJECTION, SOLUTION INTRAVENOUS at 11:41

## 2017-02-24 RX ADMIN — ENOXAPARIN SODIUM 40 MG: 40 INJECTION SUBCUTANEOUS at 10:28

## 2017-02-24 RX ADMIN — DOCUSATE SODIUM 100 MG: 100 CAPSULE, LIQUID FILLED ORAL at 08:43

## 2017-02-24 RX ADMIN — FOLIC ACID TAB 400 MCG 1 MG: 400 TAB at 17:40

## 2017-02-24 RX ADMIN — MAGNESIUM SULFATE HEPTAHYDRATE 2 G: 40 INJECTION, SOLUTION INTRAVENOUS at 08:43

## 2017-02-24 RX ADMIN — DOCUSATE SODIUM 100 MG: 100 CAPSULE, LIQUID FILLED ORAL at 17:40

## 2017-02-24 RX ADMIN — OXYCODONE HYDROCHLORIDE AND ACETAMINOPHEN 1 TABLET: 7.5; 325 TABLET ORAL at 08:54

## 2017-02-24 NOTE — PROGRESS NOTES
[facility]  Hospitalist Division        Inpatient Daily Progress Note    Daily progress Note    Patient: Rose Glasgow MRN: [id number]  CSN: [id number]    YOB: 1940  Age: 78 y.o. Sex: female    DOA: 2/21/2017 LOS:  LOS: 2 days                    Chief Complaint:  S/p mechanical fall with c/o left hip and knee pain       Subjective:      C/o pain to left leg. Denies chest pain or shortness of breath. Spouse at bedside. In NAD. Objective:      Visit Vitals    /71 (BP 1 Location: Right arm, BP Patient Position: At rest)    Pulse 96    Temp 100.1 °F (37.8 °C)    Resp 20    Ht 4' 11\" (1.499 m)    Wt 39.9 kg (88 lb)    SpO2 95%    BMI 17.77 kg/m2       Physical Exam:  General appearance: alert, cooperative, no distress, appears stated age  Head: Normocephalic, without obvious abnormality, atraumatic  Lungs: clear to auscultation bilaterally  Heart: regular rate and rhythm, S1, S2 normal, no murmur, click, rub or gallop  Abdomen: soft, non tender, non distended . Normoactive bowel sounds. No masses, no organomegaly  Extremities: extremities normal, atraumatic, no cyanosis or edema  Skin: Skin color, texture, turgor normal. No rashes or lesions  Neurologic: Grossly normal  PSY: Mood and affect normal, appropriately behaved        Intake and Output:  Current Shift:     Last three shifts:  02/22 1901 - 02/24 0700  In: 2178.3 [P.O.:190;  I.V.:1988.3]  Out: 1211 [Urine:1201; Drains:10]    Recent Results (from the past 24 hour(s))   CBC WITH AUTOMATED DIFF    Collection Time: 02/24/17  4:00 AM   Result Value Ref Range    WBC 9.7 4.6 - 13.2 K/uL    RBC 3.06 (L) 4.20 - 5.30 M/uL    HGB 10.7 (L) 12.0 - 16.0 g/dL    HCT 33.0 (L) 35.0 - 45.0 %    .8 (H) 74.0 - 97.0 FL    MCH 35.0 (H) 24.0 - 34.0 PG    MCHC 32.4 31.0 - 37.0 g/dL    RDW 14.4 11.6 - 14.5 %    PLATELET 76 (L) 741 - 420 K/uL    MPV 11.9 (H) 9.2 - 11.8 FL    NEUTROPHILS 71 40 - 73 % LYMPHOCYTES 11 (L) 21 - 52 %    MONOCYTES 18 (H) 3 - 10 %    EOSINOPHILS 0 0 - 5 %    BASOPHILS 0 0 - 2 %    ABS. NEUTROPHILS 6.9 1.8 - 8.0 K/UL    ABS. LYMPHOCYTES 1.0 0.9 - 3.6 K/UL    ABS. MONOCYTES 1.7 (H) 0.05 - 1.2 K/UL    ABS. EOSINOPHILS 0.0 0.0 - 0.4 K/UL    ABS. BASOPHILS 0.0 0.0 - 0.06 K/UL    DF AUTOMATED     METABOLIC PANEL, BASIC    Collection Time: 02/24/17  4:00 AM   Result Value Ref Range    Sodium 142 136 - 145 mmol/L    Potassium 3.4 (L) 3.5 - 5.5 mmol/L    Chloride 110 (H) 100 - 108 mmol/L    CO2 24 21 - 32 mmol/L    Anion gap 8 3.0 - 18 mmol/L    Glucose 127 (H) 74 - 99 mg/dL    BUN 12 7.0 - 18 MG/DL    Creatinine 0.62 0.6 - 1.3 MG/DL    BUN/Creatinine ratio 19 12 - 20      GFR est AA >60 >60 ml/min/1.73m2    GFR est non-AA >60 >60 ml/min/1.73m2    Calcium 7.9 (L) 8.5 - 10.1 MG/DL           Lab Results   Component Value Date/Time    Glucose 127 02/24/2017 04:00 AM    Glucose 175 02/22/2017 09:16 PM    Glucose 127 02/22/2017 03:30 AM    Glucose 122 02/21/2017 03:40 PM    Glucose 148 05/16/2013 02:00 PM        Assessment/Plan:     Patient Active Problem List   Diagnosis Code    Hip fracture ([facility] S72.009A       A/P:  Acute distal femur fracture 2/2 mechanical fall: s/p femur ORIF  Acute metabolic encephalopathy ? Delirium: improving   Chronic diastolic CHF  Hypokalemia: replete.  Check BMP in am   Tobacco abuse: Nicoderm   DVT prophylaxis: Lovenox   Dispo: [facility]       [facility]  Pager:  [phone]  Office:  [phone]

## 2017-02-24 NOTE — PROGRESS NOTES
Problem: Mobility Impaired (Adult and Pediatric)  Goal: *Acute Goals and Plan of Care (Insert Text)  Physical Therapy Goals  Initiated 2/23/2017 and to be accomplished within 7 day(s)  1. Patient will move from supine to sit and sit to supine in bed with moderate assistance . 2. Patient will transfer from bed to chair and chair to bed with minimal assistance using the least restrictive device. 3. Patient will perform sit to stand with minimal assistance/contact guard assist.  4. Patient will ambulate with minimal assistance/contact guard assist for 25 feet with the least restrictive device. Outcome: Not Progressing Towards Goal        Problem: Mobility Impaired (Adult and Pediatric)  Goal: *Acute Goals and Plan of Care (Insert Text)  Physical Therapy Goals  Initiated 2/23/2017 and to be accomplished within 7 day(s)  1. Patient will move from supine to sit and sit to supine in bed with moderate assistance . 2. Patient will transfer from bed to chair and chair to bed with minimal assistance using the least restrictive device. 3. Patient will perform sit to stand with minimal assistance/contact guard assist.  4. Patient will ambulate with minimal assistance/contact guard assist for 25 feet with the least restrictive device. PHYSICAL THERAPY TREATMENT     Patient: Kiesha Mathew (89 y.o. female)  Date: 2/24/2017  Diagnosis: Hip fx  hip fracture  Hip fracture (HCC) <principal problem not specified>  Procedure(s) (LRB):    LEFT DISTAL FEMUR OPEN REDUCTION INTERNAL FIXATION (Left) 2 Days Post-Op  Precautions: Fall  Chart, physical therapy assessment, plan of care and goals were reviewed. ASSESSMENT:  Pt is seen for scheduled PT session for L hip fracture. Pt with poor participation 2/2 confusion, resistance to movement and 10/10 pain complaints. Nursing has reported to therapist that it took 4 people to get pt back to bed this am after her sitting in a recliner 2-3 hrs.  Pt was unable to follow simple commands without max coaxing and hand placement and LE placement with maxA. Pt is unable to sit unsupported 2/2 to 100% posterior lean and it is not safe to attempt standing this patient at this time. Therapist spend several minutes encouraging pt to eat once set up with her breakfast tray. Spouse is educated on encouraging pt to eat so that she may gain some energy from protein and in turn enhance possible improvement with mobility training. Continue to see patient as directed by current PT orders. Progression toward goals:  [ ]      Improving appropriately and progressing toward goals  [ ]      Improving slowly and progressing toward goals  [X]      Not making progress toward goals and plan of care will be adjusted       PLAN:  Patient continues to benefit from skilled intervention to address the above impairments. Continue treatment per established plan of care. Discharge Recommendations:  Chris Vera  Further Equipment Recommendations for Discharge:  N/A       G-CODES:      Mobility  Current  CM= 80-99%. The severity rating is based on the Level of Assistance required for Functional Mobility and ADLs. SUBJECTIVE:   Patient stated No, I can't do it! Sesar Lal      OBJECTIVE DATA SUMMARY:   Critical Behavior:  Neurologic State: Drowsy           Functional Mobility Training:  Bed Mobility:  Rolling: Maximum assistance  Supine to Sit: Total assistance  Sit to Supine: Total assistance  Scooting: Total assistance  Balance:  Sitting: Impaired; With support  Sitting - Static: Poor (constant support) Pt with 100% resistance to sitting EOB 2/2 to posterior push     Pain:  Pt reports 10/10 pain or discomfort prior to treatment. Pt reports 10/10 pain or discomfort post treatment. Activity Tolerance:   Poor activity tolerance 2/2 pain, confusion and resistance to mobility training. Please refer to the flowsheet for vital signs taken during this treatment.   After treatment:   [ ] Patient left in no apparent distress sitting up in chair  [X] Patient left in no apparent distress in bed  [X] Call bell left within reach  [X] Nursing notified  [ ] Caregiver present  [ ] Bed alarm activated      Tresia Clonts   Time Calculation: 29 mins

## 2017-02-24 NOTE — PROGRESS NOTES
Received bedside verbal report from [facility]. Patient is resting in recliner,call bell within reach,white board updated. 1236 Dr Vic Burr in unit,made him aware of her platelet count,he is ok to give Lovenox,stated ask if ortho says ok too,will ask Ortho to verify it again,also asked Dr Vic Burr that if he can change her oral potassium to IV,stated that he is going to put order for that.    0900 PT stated that they cannot get her up from the bed to chair because of her poor endurance,decreased mobility and strength. 3230 Dr Vic Burr in [place] him again if he did put order for her IV potassium,got the verbal with read back order for 4 runs of potassium 10 mEq in 100 ml IVPB,also got the order for DUO NEB nebulization every four hours as needed. 1030 Patient resting in bed,Nebulization given,makes her comfortable. 1200 Pt is sleeping in bed,bed at the lowest position,call bell within reach. 1345 Patient sleeping,bed at the lowest position,will continue to monitor her. 1500 Pt is sleeping,daughter at bedside,bed at the lowest position. 1745 Patient resting in bed,daughter at bedside,pt is sweating,temperature is 98.4,assisted her doing ICS,still needs encouragement. 1830 Patient eating dinner by herself,looks comfortable,will continue to monitor her. Bedside and Verbal shift change report given to [facility] (oncoming nurse) by Efren Finch (offgoing nurse). Report included the following information SBAR, Kardex, Procedure Summary, Intake/Output and MAR.

## 2017-02-24 NOTE — PROGRESS NOTES
8541: AM rounds complete. Pt resting in bed. Spouse at bedside. States the plan is for dc home. Pt was a 4 person assist this am from chair to bed. Pt would not bear weight on either lower extremity. KOLE Arceo at bedside discussing plan of care.  Pt verbalized understanding.     -Orthopedic

## 2017-02-24 NOTE — PROGRESS NOTES
NUTRITION follow-up/Plan of care    RECOMMENDATIONS:   1. Regular diet  2. Ensure BID  3. Monitor weight and PO intake  4. RD to follow    GOALS:   1. Ongoing: PO intake meets >75% of protein/calorie needs by 2/27  2. Ongoing: Maintain weight (+/- 1-2 lb) by 3/3    ASSESSMENT:   Per BMI of 17.8, weight is in the underweight classification. PO intake is poor. Labs noted. Nutrition recommendations listed. RD to follow.      Nutrition Diagnoses:   Underweight related to poor appetite as evidenced by BMI of 17.8. Nutrition Risk:  [x]   High []  Moderate [] Low    SUBJECTIVE/OBJECTIVE:   Pt with Left knee distal femur comminuted metaphyseal intercondylar fracture and proximal tibial plateau fracture with lateral condylar fracture. She had Open Reduction and Internal Fixation on 2/22. No known food allergy. Pt's family report weight of 88 lb and states that her weight has never been over 100 lb. Observed lunch intake: 25%. Per patient \"it's better than nothing\". Encouraged po intake. Information Obtained From:   [x] Chart Review  [x] Patient  [] Family/Caregiver  [] Nurse/Physician   [] Patient Rounds/Interdisciplinary Meeting    Diet: Consistent Carb   Medications: [x] Reviewed   Encounter Diagnoses     ICD-10-CM ICD-9-CM   1. Closed displaced fracture of lateral condyle of left femur, initial encounter ([facility] S72.422A 821.21   2.  Tibial plateau fracture, left, closed, initial encounter S82.142A 823.00     Past Medical History:   Diagnosis Date    Rheumatoid arthritis, adult ([facility]     Tachycardia      Labs:    Lab Results   Component Value Date/Time    Sodium 142 02/24/2017 04:00 AM    Potassium 3.4 02/24/2017 04:00 AM    Chloride 110 02/24/2017 04:00 AM    CO2 24 02/24/2017 04:00 AM    Anion gap 8 02/24/2017 04:00 AM    Glucose 127 02/24/2017 04:00 AM    BUN 12 02/24/2017 04:00 AM    Creatinine 0.62 02/24/2017 04:00 AM    Calcium 7.9 02/24/2017 04:00 AM    Magnesium 1.6 02/23/2017 08:50 AM    Phosphorus 2.8 08/17/2010 05:45 AM    Albumin 4.1 02/21/2017 03:40 PM     Anthropometrics: BMI (calculated): 17.8   Last 3 Recorded Weights in this Encounter    02/22/17 0927   Weight: 39.9 kg (88 lb)      Ht Readings from Last 1 Encounters:   02/22/17 4' 11\" (1.499 m)     []  Weight Loss  []  Weight Gain  []  Weight Stable   [x]  New wt n/a on record     Estimated Nutrition Needs:   1300 Kcals/day  Protein (g): 55 g    Nutrition Problems Identified:   [x] Suboptimal PO intake   [] Food Allergies  [] Difficulty chewing/swallowing/poor dentition  [] Constipation/Diarrhea   [] Nausea/Vomiting   [] None  [] Other:     Plan:   [] Therapeutic Diet  []  Obtained/adjusted food preferences/tolerances and/or snacks options   [x]  Supplements added   [] Occupational therapy following for feeding techniques  []  HS snack added   []  Modify diet texture   []  Modify diet for food allergies   []  Educate patient   []  Assist with menu selection   [x]  Monitor PO intake on meal rounds   [x]  Continue inpatient monitoring and intervention   []  Participated in discharge planning/Interdisciplinary rounds/Team meetings   []  Other:     Education Needs:   [] Not appropriate for teaching at this time due to:   [x] Identified and addressed    Nutrition Monitoring and Evaluation:   [x] Continue inpatient monitoring and interventions    [] Other:     Patricia Batres, [address] [place] [address]  Pager: [phone]

## 2017-02-24 NOTE — PROGRESS NOTES
Patient without new complaints, status post FEMUR OPEN REDUCTION INTERNAL FIXATION for Hip fx  hip fracture  Hip fracture ([email or web address]       Visit Vitals    /71 (BP 1 Location: Right arm, BP Patient Position: At rest)    Pulse 96    Temp 100.1 °F (37.8 °C)    Resp 20    Ht 4' 11\" (1.499 m)    Wt 39.9 kg (88 lb)    SpO2 95%    BMI 17.77 kg/m2       CBC w/Diff    Lab Results   Component Value Date/Time    WBC 9.7 02/24/2017 04:00 AM    RBC 3.06 (L) 02/24/2017 04:00 AM    HCT 33.0 (L) 02/24/2017 04:00 AM    .8 (H) 02/24/2017 04:00 AM    MCH 35.0 (H) 02/24/2017 04:00 AM    MCHC 32.4 02/24/2017 04:00 AM    RDW 14.4 02/24/2017 04:00 AM    Lab Results   Component Value Date/Time    MONOS 18 (H) 02/24/2017 04:00 AM    EOS 0 02/24/2017 04:00 AM    BASOS 0 02/24/2017 04:00 AM    RDW 14.4 02/24/2017 04:00 AM          Physical exam: aaox3, surgical dressing dry,  immobilizer in place, bilateral anterior tibialis and gastrocnemius strength 5/5 , moving all toes, brisk capillary refill all nail beds, palpable distal pulses, sensation intact, BLE compartments soft and nontender.       Assessment: Status post FEMUR OPEN REDUCTION INTERNAL FIXATION for Hip fx  hip fracture  Hip fracture ([email or web address] 75[facility] , progressing.     PLAN:  Mobilize with P.T. NWB LLE  DVT ppx-lovenox for 2 wks post-op  F/u with  2 wks post-op  Discharge Planning-[facility]    KOLE Montalvo  February 24, 2017

## 2017-02-24 NOTE — PROGRESS NOTES
Problem: Mobility Impaired (Adult and Pediatric)  Goal: *Acute Goals and Plan of Care (Insert Text)  Physical Therapy Goals  Initiated 2/23/2017 and to be accomplished within 7 day(s)  1. Patient will move from supine to sit and sit to supine in bed with moderate assistance . 2. Patient will transfer from bed to chair and chair to bed with minimal assistance using the least restrictive device. 3. Patient will perform sit to stand with minimal assistance/contact guard assist.  4. Patient will ambulate with minimal assistance/contact guard assist for 25 feet with the least restrictive device. Outcome: Not Progressing Towards Goal  PHYSICAL THERAPY TREATMENT     Patient: Cruz Bolton (74 y.o. female)  Date: 2/24/2017  Diagnosis: Hip fx  hip fracture  Hip fracture (HCC) <principal problem not specified>  Procedure(s) (LRB):    LEFT DISTAL FEMUR OPEN REDUCTION INTERNAL FIXATION (Left) 2 Days Post-Op  Precautions: Fall  Chart, physical therapy assessment, plan of care and goals were reviewed. ASSESSMENT:  Pt is seen for PM treatment and pt was educated and encouraged to participate with skilled session 2/2 decreasing complications from recent surgery, poor endurance, decreased mobility, decreased ambulation and strength. Pt reports that she can not do mobility training and she is resistant to trying. Pt reported having to use the BR and she refused transfer training to [facility] and required bed pan placement that therapist assisted nurse with. Pt was max to total assist with all mobility training. Pt appeared more alert and responsive this PM, however her participation continues to be poor and requires at least assist of 2. Pt is positioned with LLE in alignment and with compression sleeve on RLE.     Progression toward goals:  [ ]      Improving appropriately and progressing toward goals  [ ]      Improving slowly and progressing toward goals  [ ]      Not making progress toward goals and plan of care will be adjusted       PLAN:  Patient continues to benefit from skilled intervention to address the above impairments. Continue treatment per established plan of care. Discharge Recommendations:  Chris Vera  Further Equipment Recommendations for Discharge:  N/A       G-CODES:      Mobility  Current  CM= 80-99%. The severity rating is based on the Level of Assistance required for Functional Mobility and ADLs. SUBJECTIVE:   Patient stated I can't do it. I don't want to do it. John Carrillo      OBJECTIVE DATA SUMMARY:   Critical Behavior: Pt more alert than this am, however she continues to report pain and refusal for mobility training 2/2 her inability to do mobility training. Functional Mobility Training:  Bed Mobility:  Rolling: Maximum assistance  Supine to Sit: Total assistance  Sit to Supine: Total assistance  Scooting: Total assistance     Balance:  Sitting: Impaired; With support  Sitting - Static: Poor (constant support)  Therapeutic Exercises:   R LE-AROM-SLR,HS,QS,ham sets, abd x 15 reps each 1 set with CGA for technique and form. Pain:  Pt reports /10 pain or discomfort prior to treatment. (Pt is unable to quantify, however she screams with all mobility training)  Pt reports /10 pain or discomfort post treatment. Activity Tolerance:   Pt more alert, however she continues to refuse mobility and continues to be max/total assist with bed mobility. Continue under current MD orders to see pt for skilled PT x 2 daily. Please refer to the flowsheet for vital signs taken during this treatment.   After treatment:   [ ] Patient left in no apparent distress sitting up in chair  [X] Patient left in no apparent distress in bed  [X] Call bell left within reach  [X] Nursing notified  [ ] Caregiver present  [ ] Bed alarm activated      Laralejandroa Dinning   Time Calculation: 12 mins

## 2017-02-24 NOTE — ROUTINE PROCESS
Bedside and Verbal shift change report given to Anamika Gant RN (oncoming nurse) by Keli Brody RN (offgoing nurse). Report included the following information SBAR, Kardex and MAR.

## 2017-02-24 NOTE — PROGRESS NOTES
1945 Received shift report from Malena Jhaveri RN. Pt has a MEWS of 7 and most recently 5. Pt refusing PO meds from day nurse. MD called and recommended encouraging pt to take Percocet. 2015 Pt willing to take Percocet, pt has no complaints of pain. Administered Percocet to help decrease pt's MEWS. Will reassess MEWS in 1 hour. 2115 Pt MEWS now a 2. Pt complains pf pain, dimmed lights and provided emotional support. 2230 Pt complains pf pain, administered pain medication. Bed in lowest position, call bell within reach, will continue to monitor. 2345 Pt complains pf pain, administered pain medication. Bed in lowest position, call bell within reach, will continue to monitor. 0200 Pt sleeping with no complaints. Bed in lowest position, call bell within reach, will continue to monitor. 0600 Pt had episode of incontinence. No complaints of pain. Got pt up in chair. Bed in lowest position, call bell within reach, will continue to monitor. Bedside and Verbal shift change report given to Maykel Anguiano RN (oncoming nurse) by Tru Rushing RN (offgoing nurse). Report included the following information SBAR, Kardex, Intake/Output, MAR and Recent Results.

## 2017-02-24 NOTE — PROGRESS NOTES
Patient without new complaints, status post distal left FEMUR OPEN REDUCTION INTERNAL FIXATION     Visit Vitals    /71 (BP 1 Location: Right arm, BP Patient Position: At rest)    Pulse 96    Temp 100.1 °F (37.8 °C)    Resp 20    Ht 4' 11\" (1.499 m)    Wt 39.9 kg (88 lb)    SpO2 95%    BMI 17.77 kg/m2       CBC w/Diff    Lab Results   Component Value Date/Time    WBC 9.7 02/24/2017 04:00 AM    RBC 3.06 (L) 02/24/2017 04:00 AM    HCT 33.0 (L) 02/24/2017 04:00 AM    .8 (H) 02/24/2017 04:00 AM    MCH 35.0 (H) 02/24/2017 04:00 AM    MCHC 32.4 02/24/2017 04:00 AM    RDW 14.4 02/24/2017 04:00 AM    Lab Results   Component Value Date/Time    MONOS 18 (H) 02/24/2017 04:00 AM    EOS 0 02/24/2017 04:00 AM    BASOS 0 02/24/2017 04:00 AM    RDW 14.4 02/24/2017 04:00 AM          Physical exam: aaox3, surgical dressing dry, immobilizer in place, bilateral anterior tibialis and gastrocnemius strength 5/5 , moving all toes, brisk capillary refill all nail beds, palpable distal pulses, sensation intact, BLE compartments soft and nontender.       Assessment: Status post Left distal femur open reduction and  internal fixation metaphyseal intercondylar and lateral condylar fracture  with comminution and closed reduction and splinting tibial plateau  fracture.      PLAN:  Mobilize with P.TRamy HERRERA LLE  DVT ppx-lovenox for 2 wks post-op  F/u with  2 wks post-op  Discharge Planning-SNF per [facility] KOLE Nails  February 24, 2017

## 2017-02-25 LAB
ANION GAP BLD CALC-SCNC: 9 MMOL/L (ref 3–18)
BUN SERPL-MCNC: 12 MG/DL (ref 7–18)
BUN/CREAT SERPL: 19 (ref 12–20)
CALCIUM SERPL-MCNC: 7.9 MG/DL (ref 8.5–10.1)
CHLORIDE SERPL-SCNC: 106 MMOL/L (ref 100–108)
CO2 SERPL-SCNC: 22 MMOL/L (ref 21–32)
CREAT SERPL-MCNC: 0.62 MG/DL (ref 0.6–1.3)
GLUCOSE SERPL-MCNC: 119 MG/DL (ref 74–99)
POTASSIUM SERPL-SCNC: 4.2 MMOL/L (ref 3.5–5.5)
SODIUM SERPL-SCNC: 137 MMOL/L (ref 136–145)

## 2017-02-25 PROCEDURE — 74011250637 HC RX REV CODE- 250/637: Performed by: HOSPITALIST

## 2017-02-25 PROCEDURE — 80048 BASIC METABOLIC PNL TOTAL CA: CPT | Performed by: HOSPITALIST

## 2017-02-25 PROCEDURE — 97530 THERAPEUTIC ACTIVITIES: CPT

## 2017-02-25 PROCEDURE — 36415 COLL VENOUS BLD VENIPUNCTURE: CPT | Performed by: HOSPITALIST

## 2017-02-25 PROCEDURE — 74011250637 HC RX REV CODE- 250/637: Performed by: ORTHOPAEDIC SURGERY

## 2017-02-25 PROCEDURE — 65270000029 HC RM PRIVATE

## 2017-02-25 PROCEDURE — 74011250636 HC RX REV CODE- 250/636: Performed by: HOSPITALIST

## 2017-02-25 RX ORDER — HEPARIN SODIUM 5000 [USP'U]/ML
5000 INJECTION, SOLUTION INTRAVENOUS; SUBCUTANEOUS EVERY 8 HOURS
Status: DISCONTINUED | OUTPATIENT
Start: 2017-02-25 | End: 2017-02-27 | Stop reason: HOSPADM

## 2017-02-25 RX ADMIN — OXYCODONE HYDROCHLORIDE AND ACETAMINOPHEN 1 TABLET: 7.5; 325 TABLET ORAL at 09:04

## 2017-02-25 RX ADMIN — Medication 10 ML: at 19:20

## 2017-02-25 RX ADMIN — DOCUSATE SODIUM 100 MG: 100 CAPSULE, LIQUID FILLED ORAL at 08:58

## 2017-02-25 RX ADMIN — ACETAMINOPHEN 650 MG: 325 TABLET, FILM COATED ORAL at 19:24

## 2017-02-25 RX ADMIN — Medication 10 ML: at 14:00

## 2017-02-25 RX ADMIN — DOCUSATE SODIUM 100 MG: 100 CAPSULE, LIQUID FILLED ORAL at 19:17

## 2017-02-25 RX ADMIN — Medication 5 ML: at 06:00

## 2017-02-25 RX ADMIN — FOLIC ACID TAB 400 MCG 1 MG: 400 TAB at 08:58

## 2017-02-25 RX ADMIN — HEPARIN SODIUM 5000 UNITS: 5000 INJECTION, SOLUTION INTRAVENOUS; SUBCUTANEOUS at 11:42

## 2017-02-25 RX ADMIN — HEPARIN SODIUM 5000 UNITS: 5000 INJECTION, SOLUTION INTRAVENOUS; SUBCUTANEOUS at 19:18

## 2017-02-25 RX ADMIN — FOLIC ACID TAB 400 MCG 1 MG: 400 TAB at 19:17

## 2017-02-25 NOTE — PROGRESS NOTES
0911 am meds given, patient in bed drinking ensure, call bell within reach, no distress noted, pain med given, will continue to monitor temperature . lovenox held due to plt less than 100    1021 MD Dolores Carlos made aware lovenox held today due to PLT 76, instructed to dc lovenox, orders received for heparin q8h 5000 units    Bedside and Verbal shift change report given to Ag (oncoming nurse) by Main Andino (offgoing nurse). Report included the following information SBAR, Kardex and MAR.

## 2017-02-25 NOTE — PROGRESS NOTES
Problem: Mobility Impaired (Adult and Pediatric)  Goal: *Acute Goals and Plan of Care (Insert Text)  Physical Therapy Goals  Initiated 2/23/2017 and to be accomplished within 7 day(s)  1. Patient will move from supine to sit and sit to supine in bed with moderate assistance . 2. Patient will transfer from bed to chair and chair to bed with minimal assistance using the least restrictive device. 3. Patient will perform sit to stand with minimal assistance/contact guard assist.  4. Patient will ambulate with minimal assistance/contact guard assist for 25 feet with the least restrictive device. Outcome: Progressing Towards Goal  PHYSICAL THERAPY TREATMENT     Patient: Du Sotelo (51 y.o. female)  Date: 2/25/2017  Diagnosis: Hip fx  hip fracture  Hip fracture (HCC) <principal problem not specified>  Procedure(s) (LRB):    LEFT DISTAL FEMUR OPEN REDUCTION INTERNAL FIXATION (Left) 3 Days Post-Op  Precautions: Fall   Chart, physical therapy assessment, plan of care and goals were reviewed. ASSESSMENT:  Pt had a distal femur fx and tibial plateau fx, not a hip fx and therefore is in a KI, NWB status. Pt is fearful of falling and initially resistant when moved. Pt did not need assist x4 this session, only myself. Pt able to assist with moving LEs of EOB, maxA to sit up and slide to edge until proximal KI off EOB. Pt then able to sit without assistance and not pushing. After sitting up pt reports needing to urinate. Max/TotA by cradling pt back to supine to place bed pan. Most of urine on bed pad, unable to measure amount. MaxA to roll R and L for sandy-care and bed pad replacement. Notified nurse. Education: log roll tech, UE placement to assist with bed mobility and sitting balance.   Progression toward goals:  [ ]      Improving appropriately and progressing toward goals  [X]      Improving slowly and progressing toward goals  [ ]      Not making progress toward goals and plan of care will be adjusted       PLAN:  Patient continues to benefit from skilled intervention to address the above impairments. Continue treatment per established plan of care. Discharge Recommendations:  Skilled Nursing Facility  Further Equipment Recommendations for Discharge:  wheelchair        SUBJECTIVE:   Patient stated I have to pee.  (once sitting EOB)      OBJECTIVE DATA SUMMARY:   Critical Behavior:  Neurologic State: Alert  Functional Mobility Training:  Bed Mobility:  Rolling: Maximum assistance  Supine to Sit: Moderate assistance; Additional time  Sit to Supine: Maximum assistance  Scooting: Total assistance  Balance:  Sitting: Intact; With support  Sitting - Static: Fair (occasional)  Sitting - Dynamic: Poor (constant support)  Therapeutic Exercises:   AP  Pain:  Pre \"not bad\"  Post \"not bad\"  Pain Scale 1: Numeric (0 - 10)     Pain Location 1: Leg  Pain Orientation 1: Left  Pain Description 1: Aching     Activity Tolerance:   Fair(-)  Please refer to the flowsheet for vital signs taken during this treatment.   After treatment:   [ ] Patient left in no apparent distress sitting up in chair  [X] Patient left in no apparent distress in bed  [X] Call bell left within reach  [X] Nursing notified  [ ] Caregiver present  [ ] Bed alarm activated      Adelaida Paul PTA   Time Calculation: 28 mins

## 2017-02-25 NOTE — PROGRESS NOTES
1925 Received shift report from [facility]. Pt lying in bed with no complaints. Bed in lowest position, call bell within reach, will continue to monitor. 2100 Pt sleeping with no complaints. Bed in lowest position, call bell within reach, will continue to monitor. 0045 Pt had episode of incontinence. Pt only complains about pain when moving, does not want pain medication at this time. Bed in lowest position, call bell within reach, will continue to monitor. 0400 Pt sleeping with no complaints. Bed in lowest position, call bell within reach, will continue to monitor. 0630 Pt lying in bed with no complaints. Bed in lowest position, call bell within reach, will continue to monitor. Bedside and Verbal shift change report given to Kassandra Fields RN (oncoming nurse) by Edyta Robin RN (offgoing nurse). Report included the following information SBAR, Kardex, Intake/Output, MAR and Recent Results.

## 2017-02-25 NOTE — PROGRESS NOTES
Problem: Mobility Impaired (Adult and Pediatric)  Goal: *Acute Goals and Plan of Care (Insert Text)  Physical Therapy Goals  Initiated 2/23/2017 and to be accomplished within 7 day(s)  1. Patient will move from supine to sit and sit to supine in bed with moderate assistance . 2. Patient will transfer from bed to chair and chair to bed with minimal assistance using the least restrictive device. 3. Patient will perform sit to stand with minimal assistance/contact guard assist.  4. Patient will ambulate with minimal assistance/contact guard assist for 25 feet with the least restrictive device. Outcome: Not Progressing Towards Goal  PHYSICAL THERAPY TREATMENT     Patient: Rose Glasgow (57 y.o. female)  Date: 2/25/2017  Diagnosis: Hip fx  hip fracture  Hip fracture (HCC) <principal problem not specified>  Procedure(s) (LRB):    LEFT DISTAL FEMUR OPEN REDUCTION INTERNAL FIXATION (Left) 3 Days Post-Op  Precautions: Fall   Chart, physical therapy assessment, plan of care and goals were reviewed. ASSESSMENT:  Pt assisted onto bed pan prior to getting up, max/totA to roll. Pt acting a little confused this afternoon. SpO2 on room air 73%, donned nasal cannula at 2L. After 1 min saturation increased to 97%. Replaced bed pad due to urine missed bed pan, total A to roll. SpO2 92% on 2L. Pt very resistant this session to bed mobility and keeps removing O2. Nurse notified. Education: keep nasal cannula on due to low oxygen saturation. Progression toward goals:  [ ]      Improving appropriately and progressing toward goals  [ ]      Improving slowly and progressing toward goals  [X]      Not making progress toward goals and plan of care will be adjusted       PLAN:  Patient continues to benefit from skilled intervention to address the above impairments. Continue treatment per established plan of care.   Discharge Recommendations:  [facility]  Further Equipment Recommendations for Discharge: wheelchair        SUBJECTIVE:   Patient stated Jer Amador might have to go.  (referring to bathroom)      OBJECTIVE DATA SUMMARY:   Critical Behavior:  Neurologic State: Alert  Functional Mobility Training:  Bed Mobility:  Rolling: Maximum assistance; Total assistance  Supine to Sit: Moderate assistance; Additional time  Sit to Supine: Maximum assistance  Scooting: Maximum assistance; Total assistance  Balance:  Sitting: Intact; With support  Sitting - Static: Fair (occasional)  Sitting - Dynamic: Poor (constant support)  Pain:  Pre 0  Post 0  Pain Scale 1: Numeric (0 - 10)     Pain Location 1: Leg  Pain Orientation 1: Left  Pain Description 1: Aching     Activity Tolerance:   Poor(-)  Please refer to the flowsheet for vital signs taken during this treatment.   After treatment:   [ ] Patient left in no apparent distress sitting up in chair  [X] Patient left in no apparent distress in bed  [X] Call bell left within reach  [X] Nursing notified  [ ] Caregiver present  [ ] Bed alarm activated      Yasmeen Paul PTA   Time Calculation: 23 mins

## 2017-02-25 NOTE — PROGRESS NOTES
Internal Medicine Progress Note    Patient's Name: Elo Laughlin  Admit Date: 2/21/2017  Length of Stay: 3      Assessment/Plan     #Acute L Distal Femur Fracture s/p Open Reduction and Internal Fixation   #Acute L Tibial Plateau Fracture  #Post-op Fever  #Acute Metabolic Encephalopathy, Possible Delirium, improving  #Chronic Diastolic CHF  #Arthritis  #Osteoporosis  #Tobacco Use  #Likely COPD  #DVT PPx     - Ortho following. Pain control PRN. PT/OT. - No evidence of infection thus far. Observe off antibx. Cont incentive spirometry. - Monitor for signs of volume overload  - Cont vit d  - Tobacco cessation discussion. Nicotine patch  - Cont duonebs PRN  - Lovenox qD    Plan for d/c to SNF on 02/27 if medically stable    I have personally reviewed all pertinent labs and films that have officially resulted over the last 24 hours. I have personally checked for all pending labs that are awaiting final results.     Subjective     Pt s/e @ bedside  Pt much more alert today  Afebrile overnight, but had elevated temp of 100.6  Pt c/o LLE pain  Denies CP or SOB    Objective     Visit Vitals    /55 (BP 1 Location: Left arm, BP Patient Position: At rest)    Pulse 92    Temp 98.3 °F (36.8 °C)    Resp 16    Ht 4' 11\" (1.499 m)    Wt 39.9 kg (88 lb)    SpO2 91%    BMI 17.77 kg/m2       Physical Exam:  General Appearance: NAD, resting in bed  Lungs: Decreased BS, no wheezing  CV: RRR, no m/r/g  Abdomen: soft, non-tender, normal bowel sounds  Extremities: no cyanosis, no peripheral edema  Neuro: AA&Ox3, no si focal deficits    Lab/Data Reviewed:  BMP:   Lab Results   Component Value Date/Time     02/25/2017 04:00 AM    K 4.2 02/25/2017 04:00 AM     02/25/2017 04:00 AM    CO2 22 02/25/2017 04:00 AM    AGAP 9 02/25/2017 04:00 AM     (H) 02/25/2017 04:00 AM    BUN 12 02/25/2017 04:00 AM    CREA 0.62 02/25/2017 04:00 AM    GFRAA >60 02/25/2017 04:00 AM    GFRNA >60 02/25/2017 04:00 AM     CBC: No results found for: WBC, HGB, HGBEXT, HCT, HCTEXT, PLT, PLTEXT, HGBEXT, HCTEXT, PLTEXT    Imaging Reviewed:  No results found.     Medications Reviewed:  Current Facility-Administered Medications   Medication Dose Route Frequency    heparin (porcine) injection 5,000 Units  5,000 Units SubCUTAneous Q8H    albuterol-ipratropium (DUO-NEB) 2.5 MG-0.5 MG/3 ML  3 mL Nebulization Q4H PRN    ondansetron (ZOFRAN ODT) tablet 4 mg  4 mg Oral Q6H PRN    HYDROmorphone (PF) (DILAUDID) injection 0.2 mg  0.2 mg IntraVENous Q4H PRN    acetaminophen (TYLENOL) tablet 650 mg  650 mg Oral Q4H PRN    sodium chloride (NS) flush 5-10 mL  5-10 mL IntraVENous Q8H    sodium chloride (NS) flush 5-10 mL  5-10 mL IntraVENous PRN    sodium chloride (NS) flush 5-10 mL  5-10 mL IntraVENous Q8H    sodium chloride (NS) flush 5-10 mL  5-10 mL IntraVENous PRN    oxyCODONE-acetaminophen (PERCOCET 7.5) 7.5-325 mg per tablet 1 Tab  1 Tab Oral Q4H PRN    naloxone (NARCAN) injection 0.4 mg  0.4 mg IntraVENous PRN    docusate sodium (COLACE) capsule 100 mg  100 mg Oral BID    sodium chloride (NS) flush 5-10 mL  5-10 mL IntraVENous Q8H    sodium chloride (NS) flush 5-10 mL  5-10 mL IntraVENous PRN    acetaminophen (TYLENOL) suppository 975 mg  975 mg Rectal PRN    ergocalciferol (ERGOCALCIFEROL) capsule 50,000 Units  50,000 Units Oral Z2M    folic acid tablet 1 mg  1 mg Oral BID    nicotine (NICODERM CQ) 14 mg/24 hr patch 1 Patch  1 Patch TransDERmal DAILY           Karen Robbins DO  Internal Medicine, Hospitalist  Pager: [phone]  [facility]

## 2017-02-26 LAB
ANION GAP BLD CALC-SCNC: 9 MMOL/L (ref 3–18)
BASOPHILS # BLD AUTO: 0 K/UL (ref 0–0.06)
BASOPHILS # BLD: 0 % (ref 0–2)
BUN SERPL-MCNC: 15 MG/DL (ref 7–18)
BUN/CREAT SERPL: 25 (ref 12–20)
CALCIUM SERPL-MCNC: 8.2 MG/DL (ref 8.5–10.1)
CHLORIDE SERPL-SCNC: 104 MMOL/L (ref 100–108)
CO2 SERPL-SCNC: 26 MMOL/L (ref 21–32)
CREAT SERPL-MCNC: 0.61 MG/DL (ref 0.6–1.3)
DIFFERENTIAL METHOD BLD: ABNORMAL
EOSINOPHIL # BLD: 0.2 K/UL (ref 0–0.4)
EOSINOPHIL NFR BLD: 3 % (ref 0–5)
ERYTHROCYTE [DISTWIDTH] IN BLOOD BY AUTOMATED COUNT: 13.8 % (ref 11.6–14.5)
GLUCOSE SERPL-MCNC: 99 MG/DL (ref 74–99)
HCT VFR BLD AUTO: 29.2 % (ref 35–45)
HGB BLD-MCNC: 9.6 G/DL (ref 12–16)
LYMPHOCYTES # BLD AUTO: 12 % (ref 21–52)
LYMPHOCYTES # BLD: 0.7 K/UL (ref 0.9–3.6)
MCH RBC QN AUTO: 34.3 PG (ref 24–34)
MCHC RBC AUTO-ENTMCNC: 32.9 G/DL (ref 31–37)
MCV RBC AUTO: 104.3 FL (ref 74–97)
MONOCYTES # BLD: 0.8 K/UL (ref 0.05–1.2)
MONOCYTES NFR BLD AUTO: 14 % (ref 3–10)
NEUTS SEG # BLD: 4.2 K/UL (ref 1.8–8)
NEUTS SEG NFR BLD AUTO: 71 % (ref 40–73)
PLATELET # BLD AUTO: 105 K/UL (ref 135–420)
PMV BLD AUTO: 11.3 FL (ref 9.2–11.8)
POTASSIUM SERPL-SCNC: 4 MMOL/L (ref 3.5–5.5)
RBC # BLD AUTO: 2.8 M/UL (ref 4.2–5.3)
SODIUM SERPL-SCNC: 139 MMOL/L (ref 136–145)
WBC # BLD AUTO: 6 K/UL (ref 4.6–13.2)

## 2017-02-26 PROCEDURE — 85025 COMPLETE CBC W/AUTO DIFF WBC: CPT | Performed by: HOSPITALIST

## 2017-02-26 PROCEDURE — 97110 THERAPEUTIC EXERCISES: CPT

## 2017-02-26 PROCEDURE — 80048 BASIC METABOLIC PNL TOTAL CA: CPT | Performed by: HOSPITALIST

## 2017-02-26 PROCEDURE — 74011250636 HC RX REV CODE- 250/636: Performed by: HOSPITALIST

## 2017-02-26 PROCEDURE — 97530 THERAPEUTIC ACTIVITIES: CPT

## 2017-02-26 PROCEDURE — 65270000029 HC RM PRIVATE

## 2017-02-26 PROCEDURE — 36415 COLL VENOUS BLD VENIPUNCTURE: CPT | Performed by: HOSPITALIST

## 2017-02-26 PROCEDURE — 74011250637 HC RX REV CODE- 250/637: Performed by: HOSPITALIST

## 2017-02-26 PROCEDURE — 74011250637 HC RX REV CODE- 250/637: Performed by: ORTHOPAEDIC SURGERY

## 2017-02-26 RX ADMIN — OXYCODONE HYDROCHLORIDE AND ACETAMINOPHEN 1 TABLET: 7.5; 325 TABLET ORAL at 14:04

## 2017-02-26 RX ADMIN — Medication 10 ML: at 14:03

## 2017-02-26 RX ADMIN — Medication 10 ML: at 00:06

## 2017-02-26 RX ADMIN — Medication 10 ML: at 07:06

## 2017-02-26 RX ADMIN — OXYCODONE HYDROCHLORIDE AND ACETAMINOPHEN 1 TABLET: 7.5; 325 TABLET ORAL at 18:34

## 2017-02-26 RX ADMIN — HEPARIN SODIUM 5000 UNITS: 5000 INJECTION, SOLUTION INTRAVENOUS; SUBCUTANEOUS at 06:00

## 2017-02-26 RX ADMIN — DOCUSATE SODIUM 100 MG: 100 CAPSULE, LIQUID FILLED ORAL at 18:34

## 2017-02-26 RX ADMIN — ACETAMINOPHEN 650 MG: 325 TABLET, FILM COATED ORAL at 06:23

## 2017-02-26 RX ADMIN — HEPARIN SODIUM 5000 UNITS: 5000 INJECTION, SOLUTION INTRAVENOUS; SUBCUTANEOUS at 22:47

## 2017-02-26 RX ADMIN — OXYCODONE HYDROCHLORIDE AND ACETAMINOPHEN 1 TABLET: 7.5; 325 TABLET ORAL at 04:24

## 2017-02-26 RX ADMIN — Medication 10 ML: at 00:05

## 2017-02-26 RX ADMIN — FOLIC ACID TAB 400 MCG 1 MG: 400 TAB at 18:33

## 2017-02-26 RX ADMIN — Medication 10 ML: at 07:07

## 2017-02-26 RX ADMIN — DOCUSATE SODIUM 100 MG: 100 CAPSULE, LIQUID FILLED ORAL at 10:10

## 2017-02-26 RX ADMIN — HEPARIN SODIUM 5000 UNITS: 5000 INJECTION, SOLUTION INTRAVENOUS; SUBCUTANEOUS at 14:02

## 2017-02-26 RX ADMIN — Medication 10 ML: at 14:04

## 2017-02-26 RX ADMIN — FOLIC ACID TAB 400 MCG 1 MG: 400 TAB at 10:10

## 2017-02-26 NOTE — PROGRESS NOTES
Patient without new complaints, status post FEMUR OPEN REDUCTION INTERNAL FIXATION     Visit Vitals    /60 (BP 1 Location: Right arm, BP Patient Position: At rest)    Pulse 74    Temp 97.6 °F (36.4 °C)    Resp 16    Ht 4' 11\" (1.499 m)    Wt 39.9 kg (88 lb)    SpO2 99%    BMI 17.77 kg/m2       CBC w/Diff    Lab Results   Component Value Date/Time    WBC 6.0 02/26/2017 04:10 AM    RBC 2.80 (L) 02/26/2017 04:10 AM    HCT 29.2 (L) 02/26/2017 04:10 AM    .3 (H) 02/26/2017 04:10 AM    MCH 34.3 (H) 02/26/2017 04:10 AM    MCHC 32.9 02/26/2017 04:10 AM    RDW 13.8 02/26/2017 04:10 AM    Lab Results   Component Value Date/Time    MONOS 14 (H) 02/26/2017 04:10 AM    EOS 3 02/26/2017 04:10 AM    BASOS 0 02/26/2017 04:10 AM    RDW 13.8 02/26/2017 04:10 AM          Physical exam: aaox3, surgical dressing dry, immobilizer in place, bilateral anterior tibialis and gastrocnemius strength 5/5 , moving all toes,  palpable distal pulses, sensation intact, BLE compartments soft and nontender.       Assessment: Status post Left distal femur open reduction and internal fixation metaphyseal intercondylar and lateral condylar fracture with comminution and closed reduction and splinting tibial plateau fracture.      PLAN:  Mobilize with P.T. SHARON LLE  DVT ppx-lovenox for 2 wks post-op  F/u with  2 wks post-op  Discharge Planning-SNF when bed available    [facility] KOLE Nails  February 26, 2017

## 2017-02-26 NOTE — ROUTINE PROCESS
Bedside and Verbal shift change report given to Janet Bowden (oncoming nurse) by Luke Chacon (offgoing nurse). Report included the following information SBAR, Kardex, Intake/Output, MAR and Recent Results.

## 2017-02-26 NOTE — PROGRESS NOTES
Internal Medicine Progress Note    Patient's Name: Ann Pozo Date: 2/21/2017  Length of Stay: 4      Assessment/Plan     This is a 79 yo female w/ a PMHx of previous hip fx, osteoporosis, arthritis, chronic diastolic CHF who was admitted for acute L distal femur fx and L tibial plateau fx s/p mechanical fall. She had open reduction and internal fixation via orthopedic surgery. Her post-op recovery was complicated by acute encephalopathy and possible delirium, but that resolved over the next two days with IVFs and pain control. She had isolated temps of 100.6 at times, but work-up thus far has been negative and she has been observed off antibiotics. Her hgb has been dropping since admission but without any signs/sx of acute bleeding and is likely expected blood loss post surgery. She was seen by PT and rehab was recommended. She is currently awaiting placement via case management. #Acute L Distal Femur Fracture s/p Open Reduction and Internal Fixation   #Acute L Tibial Plateau Fracture  #Post-op Fever  #Acute Metabolic Encephalopathy, Possible Delirium, resolved  #Anemia, Likely 2/2 Expected Post-op Loss  #Chronic Diastolic CHF  #Arthritis  #Osteoporosis  #Tobacco Use  #Likely COPD  #DVT PPx     - Ortho following. Pain control PRN. PT/OT. - No evidence of infection thus far. WBCs WNL. UA neg. CXR neg. Observe off antibx. Cont incentive spirometry. - Monitor for signs of volume overload  - Cont vit d  - Tobacco cessation discussion. Nicotine patch  - Cont duonebs PRN  - Lovenox qD    Medically stable at this point for d/c to SNF on 02/27 pending authorization by case mgmt    I have personally reviewed all pertinent labs and films that have officially resulted over the last 24 hours. I have personally checked for all pending labs that are awaiting final results.     Subjective     Pt s/e @ bedside  Cont to improve daily  Afebrile overnight again with Tmax (100.6) but denies fever or chills  Pt still c/o LLE pain but controlled w/ meds  Denies CP or SOB    Objective     Visit Vitals    /60 (BP 1 Location: Right arm, BP Patient Position: At rest)    Pulse 74    Temp 97.6 °F (36.4 °C)    Resp 16    Ht 4' 11\" (1.499 m)    Wt 39.9 kg (88 lb)    SpO2 99%    BMI 17.77 kg/m2       Physical Exam:  General Appearance: NAD, resting in bed  Lungs: Decreased BS, no wheezing  CV: RRR, no m/r/g  Abdomen: soft, non-tender, normal bowel sounds  Extremities: no cyanosis, no peripheral edema  Neuro: AA&Ox3, no focal deficits    Lab/Data Reviewed:  BMP:   Lab Results   Component Value Date/Time     02/26/2017 04:10 AM    K 4.0 02/26/2017 04:10 AM     02/26/2017 04:10 AM    CO2 26 02/26/2017 04:10 AM    AGAP 9 02/26/2017 04:10 AM    GLU 99 02/26/2017 04:10 AM    BUN 15 02/26/2017 04:10 AM    CREA 0.61 02/26/2017 04:10 AM    GFRAA >60 02/26/2017 04:10 AM    GFRNA >60 02/26/2017 04:10 AM     CBC:   Lab Results   Component Value Date/Time    WBC 6.0 02/26/2017 04:10 AM    HGB 9.6 (L) 02/26/2017 04:10 AM    HCT 29.2 (L) 02/26/2017 04:10 AM     (L) 02/26/2017 04:10 AM       Imaging Reviewed:  No results found.     Medications Reviewed:  Current Facility-Administered Medications   Medication Dose Route Frequency    heparin (porcine) injection 5,000 Units  5,000 Units SubCUTAneous Q8H    albuterol-ipratropium (DUO-NEB) 2.5 MG-0.5 MG/3 ML  3 mL Nebulization Q4H PRN    ondansetron (ZOFRAN ODT) tablet 4 mg  4 mg Oral Q6H PRN    HYDROmorphone (PF) (DILAUDID) injection 0.2 mg  0.2 mg IntraVENous Q4H PRN    acetaminophen (TYLENOL) tablet 650 mg  650 mg Oral Q4H PRN    sodium chloride (NS) flush 5-10 mL  5-10 mL IntraVENous Q8H    sodium chloride (NS) flush 5-10 mL  5-10 mL IntraVENous PRN    sodium chloride (NS) flush 5-10 mL  5-10 mL IntraVENous Q8H    sodium chloride (NS) flush 5-10 mL  5-10 mL IntraVENous PRN    oxyCODONE-acetaminophen (PERCOCET 7.5) 7.5-325 mg per tablet 1 Tab  1 Tab Oral Q4H PRN    naloxone (NARCAN) injection 0.4 mg  0.4 mg IntraVENous PRN    docusate sodium (COLACE) capsule 100 mg  100 mg Oral BID    sodium chloride (NS) flush 5-10 mL  5-10 mL IntraVENous Q8H    sodium chloride (NS) flush 5-10 mL  5-10 mL IntraVENous PRN    acetaminophen (TYLENOL) suppository 975 mg  975 mg Rectal PRN    ergocalciferol (ERGOCALCIFEROL) capsule 50,000 Units  50,000 Units Oral F4H    folic acid tablet 1 mg  1 mg Oral BID    nicotine (NICODERM CQ) 14 mg/24 hr patch 1 Patch  1 Patch TransDERmal DAILY           Katharine Delgado DO  Internal Medicine, Hospitalist  Pager: [phone]  [address] [place] [address] Drive [facility]

## 2017-02-26 NOTE — PROGRESS NOTES
Problem: Mobility Impaired (Adult and Pediatric)  Goal: *Acute Goals and Plan of Care (Insert Text)  Physical Therapy Goals  Initiated 2/23/2017 and to be accomplished within 7 day(s)  1. Patient will move from supine to sit and sit to supine in bed with moderate assistance . 2. Patient will transfer from bed to chair and chair to bed with minimal assistance using the least restrictive device. 3. Patient will perform sit to stand with minimal assistance/contact guard assist.  4. Patient will ambulate with minimal assistance/contact guard assist for 25 feet with the least restrictive device. Outcome: Progressing Towards Goal  PHYSICAL THERAPY TREATMENT     Patient: Ronit Romero (23 y.o. female)  Date: 2/26/2017  Diagnosis: Hip fx  hip fracture  Hip fracture (HCC) <principal problem not specified>  Procedure(s) (LRB):    LEFT DISTAL FEMUR OPEN REDUCTION INTERNAL FIXATION (Left) 4 Days Post-Op  Precautions: Fall  Chart, physical therapy assessment, plan of care and goals were reviewed. ASSESSMENT:  Pt progress to goals limited by confusion, poor activity tolerance. Presents in bed; worked on [employer] there ex. And L LE ankle pumps; sitting EOB pt demos poor sit balance leaning backward and requiring mod to max a to achieve/maintain sitting EOB. Pleasant but confused. Grabbing on to [facility] and required second person assist second difficulty following commands for sequencing functional tasks. Requires max cueing for R LE placement during transitions. Total assist pivot BED <>BSC. EDUCATION:  Transfer, NWB  Progression toward goals:          Improving slowly and progressing toward goals       PLAN:  Patient continues to benefit from skilled intervention to address the above impairments. Continue treatment per established plan of care. Discharge Recommendations:  snf  Further Equipment Recommendations for Discharge:  tbd upon dc from snf       SUBJECTIVE:   Patient stated im just so confused.  \"I want to do everything everyone tells me but Im just so tired. \"      OBJECTIVE DATA SUMMARY:   Critical Behavior:  Neurologic State: Alert  Orientation Level: Oriented X4  Cognition: Follows commands, Appropriate decision making, Appropriate for age attention/concentration, Appropriate safety awareness        G CODE:na  Functional Mobility Training:  Bed Mobility:  Rolling: Maximum assistance  Supine to Sit: Moderate assistance;Maximum assistance w raised HOB  Sit to Supine: Maximum assistance; Total assistance;Assist x2 and bed in trendelenburg for gravity assist                Interventions: Safety awareness training; Tactile cues; Verbal cues  Transfers:     Stand Pivot ([place]) Transfers: Additional time (total A x 1 & 2nd person to stabilize BSC)  Interventions: Manual cues; Safety awareness training; Tactile cues; Verbal cues     Balance:  Sitting: Impaired  Sitting - Static: Poor (constant support)  Sitting - Dynamic: Poor (constant support)  Standing: Impaired; With support  Standing - Static: Poor  Standing - Dynamic : None  Ambulation/Gait Training:unable to ambulate at this time  Secondary unable to maintain NWB, confusion, poor activity tolerance   Therapeutic Exercises:   B LE ankle pumps x 10;  R LE SLR x 5 reps and abd/add x 5 reps; R LAQ x 10  Vital Signs  Temp: 97.6 °F (36.4 °C)     Pulse (Heart Rate): 74     BP: 101/60     Resp Rate: 16     O2 Sat (%): 99 %  Pain:  Pre treatment pain level:6  Post treatment pain level:0 at rest after intervention  Pain Scale 1: Numeric (0 - 10)  Pain Intensity 1: 6  Pain Location 1: Leg  Pain Orientation 1: Left  Pain Description 1: Aching  Pain Intervention(s) 1: Medication (see MAR)  Activity Tolerance:   poor     After treatment:   PPatient left in no apparent distress in bed w L LE elevated and KI inp lace  Call bell left within reach  Nursing, [facility]  notified      Susan Washington PTA   Time Calculation: 35 mins

## 2017-02-26 NOTE — PROGRESS NOTES
Received pt alert and oriented X 2 with periods of confusion. Pt denied pain. Noted knee immobilizer in place to left lower extremity with ace bandage intact no s/s of drainage. Cap refill and pulses palpable. Pt denied pain, numbness tingling , SOB, chest pain. Pt repositioned in bed HOB elevated. Assisted with  ml. Pt encouraged to drink fluids and assisted. IV hep lock to right  hand dry and intact dressing, flushed with no s/s of infiltration. IV hep lock to  Left  forearm # 18 dry and intact dressing, flushed with no s/s of infiltration. Call bell with in reach bed in lowest position. Monitoring ongoing. 0000 Pt in bed assisted with bed pan. She denies pain. Repositioned. Call bell within reach. Monitoring ongoing. 0400 Pt complained of pain 7/10 on pain scale PRN pain medication given. Pt assisted with bed pan and pericare provided. Pt assisted with  repositioned. Call bell within reach, bed in lowest position. Monitoring ongoing.

## 2017-02-26 NOTE — PROGRESS NOTES
1418 PT at bedside, meds given, call bell within reach, no distress noted, patient alert and oriented with periodic confusion. 1450 assisted PT return patient back to bed, max assist    1838 patient in bed, meds given, patient drinking ensure and watching tv, call bell within reach,no distress  Noted     Bedside and Verbal shift change report given to [facility] RN (oncoming nurse) by Olivia Greer RN (offgoing nurse). Report included the following information SBAR, Kardex and MAR.

## 2017-02-27 VITALS
TEMPERATURE: 98.3 F | SYSTOLIC BLOOD PRESSURE: 86 MMHG | HEIGHT: 59 IN | DIASTOLIC BLOOD PRESSURE: 44 MMHG | HEART RATE: 74 BPM | RESPIRATION RATE: 18 BRPM | BODY MASS INDEX: 17.74 KG/M2 | WEIGHT: 88 LBS | OXYGEN SATURATION: 96 %

## 2017-02-27 LAB
ANION GAP BLD CALC-SCNC: 7 MMOL/L (ref 3–18)
BASOPHILS # BLD AUTO: 0 K/UL (ref 0–0.06)
BASOPHILS # BLD: 1 % (ref 0–2)
BUN SERPL-MCNC: 17 MG/DL (ref 7–18)
BUN/CREAT SERPL: 27 (ref 12–20)
CALCIUM SERPL-MCNC: 8.3 MG/DL (ref 8.5–10.1)
CHLORIDE SERPL-SCNC: 104 MMOL/L (ref 100–108)
CO2 SERPL-SCNC: 28 MMOL/L (ref 21–32)
CREAT SERPL-MCNC: 0.63 MG/DL (ref 0.6–1.3)
DIFFERENTIAL METHOD BLD: ABNORMAL
EOSINOPHIL # BLD: 0.3 K/UL (ref 0–0.4)
EOSINOPHIL NFR BLD: 5 % (ref 0–5)
ERYTHROCYTE [DISTWIDTH] IN BLOOD BY AUTOMATED COUNT: 13.8 % (ref 11.6–14.5)
GLUCOSE SERPL-MCNC: 114 MG/DL (ref 74–99)
HCT VFR BLD AUTO: 30.3 % (ref 35–45)
HGB BLD-MCNC: 10.1 G/DL (ref 12–16)
LYMPHOCYTES # BLD AUTO: 16 % (ref 21–52)
LYMPHOCYTES # BLD: 0.8 K/UL (ref 0.9–3.6)
MCH RBC QN AUTO: 34.4 PG (ref 24–34)
MCHC RBC AUTO-ENTMCNC: 33.3 G/DL (ref 31–37)
MCV RBC AUTO: 103.1 FL (ref 74–97)
MONOCYTES # BLD: 0.7 K/UL (ref 0.05–1.2)
MONOCYTES NFR BLD AUTO: 14 % (ref 3–10)
NEUTS SEG # BLD: 3.4 K/UL (ref 1.8–8)
NEUTS SEG NFR BLD AUTO: 64 % (ref 40–73)
PLATELET # BLD AUTO: 121 K/UL (ref 135–420)
PMV BLD AUTO: 10.6 FL (ref 9.2–11.8)
POTASSIUM SERPL-SCNC: 3.9 MMOL/L (ref 3.5–5.5)
RBC # BLD AUTO: 2.94 M/UL (ref 4.2–5.3)
SODIUM SERPL-SCNC: 139 MMOL/L (ref 136–145)
WBC # BLD AUTO: 5.3 K/UL (ref 4.6–13.2)

## 2017-02-27 PROCEDURE — 74011250637 HC RX REV CODE- 250/637: Performed by: ORTHOPAEDIC SURGERY

## 2017-02-27 PROCEDURE — 85025 COMPLETE CBC W/AUTO DIFF WBC: CPT | Performed by: HOSPITALIST

## 2017-02-27 PROCEDURE — 97530 THERAPEUTIC ACTIVITIES: CPT

## 2017-02-27 PROCEDURE — 74011250636 HC RX REV CODE- 250/636: Performed by: HOSPITALIST

## 2017-02-27 PROCEDURE — 36415 COLL VENOUS BLD VENIPUNCTURE: CPT | Performed by: HOSPITALIST

## 2017-02-27 PROCEDURE — 74011250637 HC RX REV CODE- 250/637: Performed by: HOSPITALIST

## 2017-02-27 PROCEDURE — 80048 BASIC METABOLIC PNL TOTAL CA: CPT | Performed by: HOSPITALIST

## 2017-02-27 RX ORDER — ENOXAPARIN SODIUM 100 MG/ML
30 INJECTION SUBCUTANEOUS DAILY
Qty: 14 SYRINGE | Refills: 0 | Status: SHIPPED | OUTPATIENT
Start: 2017-02-27 | End: 2019-08-16

## 2017-02-27 RX ORDER — OXYCODONE AND ACETAMINOPHEN 7.5; 325 MG/1; MG/1
1 TABLET ORAL
Qty: 60 TAB | Refills: 0 | Status: SHIPPED | OUTPATIENT
Start: 2017-02-27 | End: 2019-08-16

## 2017-02-27 RX ADMIN — ACETAMINOPHEN 650 MG: 325 TABLET, FILM COATED ORAL at 06:10

## 2017-02-27 RX ADMIN — FOLIC ACID TAB 400 MCG 1 MG: 400 TAB at 09:08

## 2017-02-27 RX ADMIN — Medication 10 ML: at 03:51

## 2017-02-27 RX ADMIN — HEPARIN SODIUM 5000 UNITS: 5000 INJECTION, SOLUTION INTRAVENOUS; SUBCUTANEOUS at 06:05

## 2017-02-27 RX ADMIN — OXYCODONE HYDROCHLORIDE AND ACETAMINOPHEN 1 TABLET: 7.5; 325 TABLET ORAL at 09:41

## 2017-02-27 RX ADMIN — Medication 10 ML: at 03:52

## 2017-02-27 RX ADMIN — OXYCODONE HYDROCHLORIDE AND ACETAMINOPHEN 1 TABLET: 7.5; 325 TABLET ORAL at 03:52

## 2017-02-27 RX ADMIN — Medication 10 ML: at 09:04

## 2017-02-27 RX ADMIN — DOCUSATE SODIUM 100 MG: 100 CAPSULE, LIQUID FILLED ORAL at 09:08

## 2017-02-27 RX ADMIN — OXYCODONE HYDROCHLORIDE AND ACETAMINOPHEN 1 TABLET: 7.5; 325 TABLET ORAL at 15:30

## 2017-02-27 NOTE — PROGRESS NOTES
Progress Note      Post Operative Day: 5    Assessment:    1. Status post  FEMUR OPEN REDUCTION INTERNAL FIXATION for Hip fx  hip fracture  Hip fracture ([facility] [address] 2/21/2017,   Progressing. PLAN:    1. Mobilize. Continue P.T.  2.  NWB  3. Lovenox for DVT prophylaxis per ortho  4. Discharge Planning to rehab when ok with medicine. F/u in the office in 2 weeks with Dr. Tomas Carson. Continue immobilizer           HPI: Milderd Standard is a 78 y.o. female patient without new complaints status post ORIF for Hip fx  hip fracture  Hip fracture ([facility] [address] 2/21/2017. No new orthopaedic changes. Blood pressure (!) 86/44, pulse 74, temperature 98.3 °F (36.8 °C), resp. rate 18, height 4' 11\" (1.499 m), weight 39.9 kg (88 lb), SpO2 96 %. CBC w/Diff   Lab Results   Component Value Date/Time    WBC 5.3 02/27/2017 03:40 AM    RBC 2.94 (L) 02/27/2017 03:40 AM    HCT 30.3 (L) 02/27/2017 03:40 AM          Physical Assessment:  General: in no apparent distress   Extremities:  Neurovascular intact    Dressing:  Dry   DVT Exam:   No exam evidence to suggest DVT. Compartments soft and NT.           Radiology: none ortho         Tracee Grullon PA-C  2/27/2017  Pager [phone]  Office [phone]  Cell [phone]

## 2017-02-27 NOTE — DISCHARGE SUMMARY
Discharge Summary    Patient: Amanda Meneses               Sex: female          DOA: 2/21/2017         YOB: 1940      Age:  78 y.o.        LOS:  LOS: 5 days                Admit Date: 2/21/2017    Discharge Date: 2/27/2017    Discharge Medications:     Current Discharge Medication List      START taking these medications    Details   oxyCODONE-acetaminophen (PERCOCET 7.5) 7.5-325 mg per tablet Take 1 Tab by mouth every four (4) hours as needed. Max Daily Amount: 6 Tabs. Qty: 60 Tab, Refills: 0      enoxaparin (LOVENOX) 30 mg/0.3 mL injection 0.3 mL by SubCUTAneous route daily. Qty: 14 Syringe, Refills: 0         CONTINUE these medications which have NOT CHANGED    Details   folic acid (FOLVITE) 1 mg tablet Take 1 mg by mouth two (2) times a day. ergocalciferol (VITAMIN D2) 50,000 unit capsule Take 50,000 Units by mouth every seven (7) days. omega-3 fatty acids-vitamin e (FISH OIL) 1,000 mg cap Take 1 Cap by mouth.      pravastatin (PRAVACHOL) 20 mg tablet Take 20 mg by mouth nightly. glucosamine (GLUCOSAMINE RELIEF) 1,000 mg tab Take 1,500 mg by mouth. acetaminophen (TYLENOL) 325 mg tablet Take  by mouth every four (4) hours as needed for Pain. HYDROcodone-acetaminophen 5-500 mg cap Take 5 mg by mouth. methotrexate (RHEUMATREX) 2.5 mg tablet Take 2.5 mg by mouth Every Friday.              Follow-up: pcp, ortho    Discharge Condition: Stable    Activity: PT/OT Eval and Treat    Diet: Resume previous diet    Labs:  Labs: Results:       Chemistry Recent Labs      02/27/17   0340  02/26/17   0410  02/25/17   0400   GLU  114*  99  119*   NA  139  139  137   K  3.9  4.0  4.2   CL  104  104  106   CO2  28  26  22   BUN  17  15  12   CREA  0.63  0.61  0.62   CA  8.3*  8.2*  7.9*   AGAP  7  9  9   BUCR  27*  25*  19      CBC w/Diff Recent Labs      02/27/17   0340  02/26/17   0410   WBC  5.3  6.0   RBC  2.94*  2.80*   HGB  10.1*  9.6*   HCT  30.3*  29.2*   PLT  121*  105* GRANS  64  71   LYMPH  16*  12*   EOS  5  3      Cardiac Enzymes No results for input(s): CPK, CKND1, MOSES in the last 72 hours. No lab exists for component: CKRMB, TROIP   Coagulation No results for input(s): PTP, INR, APTT in the last 72 hours. No lab exists for component: INREXT    Lipid Panel No results found for: CHOL, CHOLPOCT, CHOLX, CHLST, CHOLV, [id number], HDL, LDL, NLDLCT, DLDL, LDLC, DLDLP, [id number], VLDLC, VLDL, TGL, TGLX, TRIGL, [id number], TRIGP, TGLPOCT, [id number], CHHD, CHHDX   BNP No results for input(s): BNPP in the last 72 hours. Liver Enzymes No results for input(s): TP, ALB, TBIL, AP, SGOT, GPT in the last 72 hours. No lab exists for component: DBIL   Thyroid Studies No results found for: T4, T3U, TSH, TSHEXT       Imagin. Comminuted, impacted, intra-articular fracture of the distal left femur, with  predominant involvement of the distal lateral femoral condyle and  intra-articular extension across the intercondylar notch. 2. Nondisplaced fracture of the lateral tibial plateau, with minimal articular  surface incongruency. 3. Large volume lipohemarthrosis. 4. Severe underlying osteopenia.       Consults: Orthopedics    Treatment Team: Treatment Team: Attending Provider: Harman Gibson MD; Care Manager: Everton Palencia; Utilization Review: Abhijeet Zamora RN; Consulting Provider: Paul Alicea MD; Nurse Practitioner: Cynthia Carter NP; Physical Therapy Assistant: Kel Carbajal PTA    Significant Diagnostic Studies: labs:   Recent Results (from the past 24 hour(s))   CBC WITH AUTOMATED DIFF    Collection Time: 17  3:40 AM   Result Value Ref Range    WBC 5.3 4.6 - 13.2 K/uL    RBC 2.94 (L) 4.20 - 5.30 M/uL    HGB 10.1 (L) 12.0 - 16.0 g/dL    HCT 30.3 (L) 35.0 - 45.0 %    .1 (H) 74.0 - 97.0 FL    MCH 34.4 (H) 24.0 - 34.0 PG    MCHC 33.3 31.0 - 37.0 g/dL    RDW 13.8 11.6 - 14.5 %    PLATELET 093 (L) 879 - 420 K/uL    MPV 10.6 9.2 - 11.8 FL    NEUTROPHILS 64 40 - 73 %    LYMPHOCYTES 16 (L) 21 - 52 %    MONOCYTES 14 (H) 3 - 10 %    EOSINOPHILS 5 0 - 5 %    BASOPHILS 1 0 - 2 %    ABS. NEUTROPHILS 3.4 1.8 - 8.0 K/UL    ABS. LYMPHOCYTES 0.8 (L) 0.9 - 3.6 K/UL    ABS. MONOCYTES 0.7 0.05 - 1.2 K/UL    ABS. EOSINOPHILS 0.3 0.0 - 0.4 K/UL    ABS. BASOPHILS 0.0 0.0 - 0.06 K/UL    DF AUTOMATED     METABOLIC PANEL, BASIC    Collection Time: 02/27/17  3:40 AM   Result Value Ref Range    Sodium 139 136 - 145 mmol/L    Potassium 3.9 3.5 - 5.5 mmol/L    Chloride 104 100 - 108 mmol/L    CO2 28 21 - 32 mmol/L    Anion gap 7 3.0 - 18 mmol/L    Glucose 114 (H) 74 - 99 mg/dL    BUN 17 7.0 - 18 MG/DL    Creatinine 0.63 0.6 - 1.3 MG/DL    BUN/Creatinine ratio 27 (H) 12 - 20      GFR est AA >60 >60 ml/min/1.73m2    GFR est non-AA >60 >60 ml/min/1.73m2    Calcium 8.3 (L) 8.5 - 10.1 MG/DL         Discharge diagnoses:    Problem List as of 2/27/2017  Date Reviewed: 2/22/2017          Codes Class Noted - Resolved    Hip fracture [facility]) ICD-10-CM: L74.222T  ICD-9-CM: 820.8  2/22/2017 - Present            Hospital Course:   Major issues addressed during hospitalization outlined  below. Morgan Gale is a 78 y.o. female with a PMHx of osteoporosis, tobacco use and arthritis who presented to the ED with acute onset of L hip/knee pain after a mechanical fall. She admits to being at her baseline health today prior to the event. She states she was using the bathroom and when she got up and was pulling her pants up she lost her balance and fell to the ground on her left leg. She immediately had excruciating pain and discomfort and thus activated EMS. In the ED, imaging showed an acute fracture of the distal femur and tibial plateau on the left lower extremity. Orthopedic surgery was called and they will be taking her to the OR reid. She only complains of pain at this time.      She had open reduction and internal fixation via orthopedic surgery.  Her post-op recovery was complicated by acute encephalopathy and possible delirium, but that resolved over the next two days with IVFs and pain control. She had isolated temps of 100.6 at times, but work-up thus far has been negative and she has been observed off antibiotics. Her hgb has been dropping since admission but without any signs/sx of acute bleeding and is likely expected blood loss post surgery. She was seen by PT and rehab was recommended. She was dced to snf when auth obtained.       Susanne Sadler MD  February 27, 2017        Total time spent 40 minutes

## 2017-02-27 NOTE — ROUTINE PROCESS
Bedside and Verbal shift change report given to Jermaine Celis (oncoming nurse) by Andrei Bella RN (offgoing nurse). Report included the following information SBAR, Kardex, Intake/Output, MAR and Recent Results.

## 2017-02-27 NOTE — PROGRESS NOTES
Care Management Interventions  PCP Verified by CM:  Yes  Palliative Care Consult (Criteria: CHF and RRAT>21): No  Reason for No Palliative Care Consult: Patient declined palliative services at this time  Mode of Transport at Discharge: BLS  Transition of Care Consult (CM Consult): SNF  Discharge Durable Medical Equipment: No  Physical Therapy Consult: No  Occupational Therapy Consult: No  Speech Therapy Consult: No  Current Support Network: Lives with Spouse, Own Home  Confirm Follow Up Transport: Other (see comment)  Plan discussed with Pt/Family/Caregiver: Yes  Freedom of Choice Offered: Yes  Discharge Location  Discharge Placement: Skilled nursing facility

## 2017-02-27 NOTE — PROGRESS NOTES
0730 Received bedside report from Tan Carey RN, updated white board, call bell is within reach. 0900 Assisted the patient on the bedside commode. Call bell is within reach. 1130 Patient placed the patient to sit up. She is a MAX assist in getting in and out. Call bell is within reach. 1330 Gave report to GRISEL Sanon to assume care of the patient until I returned to the unit. Bret 18 care of the patient. Patient's transportation to a SNIFF has been arranged at this time, receiving facility received report from Berkley Sykes before my return to the unit. [phone] Transport here for the patient. All personal items sent with the patient at this time.

## 2017-02-27 NOTE — PROGRESS NOTES
Received pt alert and oriented X 3, with periods of confusion. Pt in bed with HOB elevated. Left lower extremity elevated on pillow with immobilizer in place. She has +2 cap refill. She denies SOB or chest pain, numbness or tingling. She has hep lock to  left forearm with old drainage. Flushed and patent no s/s of infiltration. Patient also has hep lock to right hand flushed and patent no s/s of infiltration or phlebitis  Bed in lowest position. Call bel within reach, Half side rails up for safety. Monitoring ongoing. 0000 pt in bed resting quietly. Stated her pain is 0/10 on pain scale has intermittent period of confusing upon waking. Call bell within reach monitoring ongoing. 0330 Bed bath given and hair washed with  dry shampoo. Pt turned and repositioned. Monitoring ongoing.

## 2017-02-27 NOTE — PROGRESS NOTES
230 pm: Patient has authorization for admission. DC summary in  [address] [place] [address], have called and spoke to the patients daughter. Transportation arranged for 330 pm. PCS form and Check list with 96 to nurses station. Otto Hansen RN    1250 pm: Awaiting insurance authorization for admission to [facility]. Savannah Barber RN    Spoke to the patients daughter she agreed to matching to [facility]. [facility] has begun authorization process for admission.  Otto Hansen RN

## 2017-02-27 NOTE — PROGRESS NOTES
Problem: Mobility Impaired (Adult and Pediatric)  Goal: *Acute Goals and Plan of Care (Insert Text)  Physical Therapy Goals  Initiated 2/23/2017 and to be accomplished within 7 day(s)  1. Patient will move from supine to sit and sit to supine in bed with moderate assistance . 2. Patient will transfer from bed to chair and chair to bed with minimal assistance using the least restrictive device. 3. Patient will perform sit to stand with minimal assistance/contact guard assist.  4. Patient will ambulate with minimal assistance/contact guard assist for 25 feet with the least restrictive device. Outcome: Progressing Towards Goal  PHYSICAL THERAPY TREATMENT     Patient: Faith Bacon (69 y.o. female)  Date: 2/27/2017  Diagnosis: Hip fx  hip fracture  Hip fracture (HCC) <principal problem not specified>  Procedure(s) (LRB):    LEFT DISTAL FEMUR OPEN REDUCTION INTERNAL FIXATION (Left) 5 Days Post-Op  Precautions: Fall  Chart, physical therapy assessment, plan of care and goals were reviewed. ASSESSMENT:presents in bed on bed pan. Had small bm. Cleaned and repositioned. Worked on sliding board transfer to [employer] however pt unable secondary leaning backward throughout; dependent sit pivot transfer bed to . No standing or pre gait yet as unable to maintain NWB L LE. Spoke w nursing appears pale. hgb 10.1 Nsg repoorts doing much better. Talkative alert. Continues dependent for mobility. Pt now up in chair and requesting to void. Two man lift with third person to position bed pan. Pt voiding . Attempted sit to stand two person assist and walker; nurse removed bed pan; hygiene performed, Nsg assist appreciated. EDUCATION:  ICS; transfer  Progression toward goals:          Improving slowly and progressing toward goals       PLAN:  Patient continues to benefit from skilled intervention to address the above impairments. Continue treatment per established plan of care.   Discharge Recommendations: snf}  Further Equipment Recommendations for Discharge:  tbd upon dc from snf       SUBJECTIVE:   Patient stated im out of breath.       OBJECTIVE DATA SUMMARY:   Critical Behavior:  Neurologic State: Alert  Orientation Level: Oriented X4 (Pt has periods of confusion.)  Cognition: Follows commands     G CODE:na  Functional Mobility Training:  Bed Mobility:  Rolling: Moderate assistance  Supine to Sit: Maximum assistance; Total assistance                Interventions: Safety awareness training; Tactile cues; Verbal cues  Transfers:  Sit to Stand: Maximum assistance; Total assistance of 2 ppl from raised EOB; from wc  Stand to Sit: Maximum assistance; Total assistance  Bed to Chair: Supervision;Maximum assistance; Total assistance; Additional time;Assist x1;Assist x2 (sit pivot)  Interventions: Safety awareness training; Tactile cues; Verbal cues  Balance:  Sitting: Impaired  Sitting - Static: Poor (constant support) (leans backward)  Sitting - Dynamic: Poor (constant support)  Standing: Impaired  Standing - Static: Other (comment) (none)  Standing - Dynamic : None  Ambulation/Gait Training:  non ambulatory at this time   Vital Signs  Temp: 98.3 °F (36.8 °C)     Pulse (Heart Rate): 74     BP: 99/48  Resp Rate: 18     O2 Sat (%): 93 % RA reapplied 02 @ 2 l  Pain:  Pre treatment pain level:  6  Post treatment pain level:  4  Pain Scale 1: Numeric (0 - 10)  Pain Intensity 1: 4  Pain Location 1: Leg  Pain Orientation 1: Left  Pain Description 1: Aching  Pain Intervention(s) 1: Medication (see MAR)  Activity Tolerance:   poor     After treatment:   Patient left in no apparent distress sitting up in wheel chair  Call bell left within reach  Rommel France  notified      Kel Carbajal PTA   Time Calculation: 21 mins

## 2017-02-27 NOTE — PROGRESS NOTES
Hospital to SNF SBAR Handoff - Carmencita Many                                                                        78 y.o.   female    [address]   Room: 2219/01    [address]  Unit Phone# :  [phone]      [place] [address]  Dept: [phone]  Loc: [phone]                    SITUATION     Admitted:  2/21/2017         Attending Provider:  Kelli Almonte MD       Consultations:  None    PCP:  Olimpia German MD   [phone]    Treatment Team: Attending Provider: Kelli Almonte MD; Care Manager: David Cardoza; Utilization Review: Obi Purvis RN; Consulting Provider: Edgar Weir MD; Nurse Practitioner: Tucker Diehl NP; Physical Therapy Assistant: Russ Boateng PTA    Admitting Dx: Hip fx  hip fracture  Hip fracture (HCC)       Principal Problem: <principal problem not specified>    5 Days Post-Op of   Procedure(s):    LEFT DISTAL FEMUR OPEN REDUCTION INTERNAL FIXATION   BY: Edgar Weir MD             ON: 2/22/2017                  Code Status: Full Code                Advance Directives:   Advance Care Planning 2/22/2017   Patient's Healthcare Decision Maker is: -   Confirm Advance Directive Yes, not on file    (Send w/patient)   Not Received       Isolation:  There are currently no Active Isolations       MDRO: No current active infections    Pain Medications given:  Hydrocodone 7.5mg/325mg    Last dose: 02/27/17 at  [address] needed: yes  Type of equipment:n  walker      (Not currently on dialysis)  (Not currently on dialysis)  (Not currently on dialysis)     BACKGROUND     Allergies:   Allergies   Allergen Reactions    Aspirin Unknown (comments)    Codeine Unknown (comments)       Past Medical History:   Diagnosis Date    [facility]oid arthritis, adult ([facility] [address]     Tachycardia        Past Surgical History:   Procedure Laterality Date    HX CAROTID ENDARTERECTOMY      left    HX CATARACT REMOVAL      bilateral    HX HIP REPLACEMENT      left       Prescriptions Prior to Admission   Medication Sig    folic acid (FOLVITE) 1 mg tablet Take 1 mg by mouth two (2) times a day.  ergocalciferol (VITAMIN D2) 50,000 unit capsule Take 50,000 Units by mouth every seven (7) days.  omega-3 fatty acids-vitamin e (FISH OIL) 1,000 mg cap Take 1 Cap by mouth.  pravastatin (PRAVACHOL) 20 mg tablet Take 20 mg by mouth nightly.  glucosamine (GLUCOSAMINE RELIEF) 1,000 mg tab Take 1,500 mg by mouth.  acetaminophen (TYLENOL) 325 mg tablet Take  by mouth every four (4) hours as needed for Pain.  HYDROcodone-acetaminophen 5-500 mg cap Take 5 mg by mouth.  methotrexate (RHEUMATREX) 2.5 mg tablet Take 2.5 mg by mouth Every Friday. Hard scripts included in transfer packet yes    Vaccinations:    Immunization History   Administered Date(s) Administered    Influenza Vaccine 09/23/2016    Tdap 05/16/2013       Readmission Risks:    Known Risks: Fall, pain, infection        The Charlson [employer] Index tool is an evidenced based tool that has more automatic generated information. The tool looks at many different items such as the age of the patient, how many times they were admitted in the last calendar year, current length of stay in the hospital and their diagnosis. All of these items are pulled automatically from information documented in the chart from various places and will generate a score that predicts whether a patient is at low (less than 13), medium (13-20) or high (21 or greater) risk of being readmitted.         ASSESSMENT                Temp: 98.3 °F (36.8 °C) (02/27/17 0907) Pulse (Heart Rate): 74 (02/27/17 0907)     Resp Rate: 18 (02/27/17 0907)           BP: (!) 86/44 (02/27/17 0907)     O2 Sat (%): 96 % (02/27/17 0907)     Weight: 39.9 kg (88 lb)    Height: 4' 11\" (149.9 cm) (02/22/17 0927)       If above not within 1 hour of discharge:    BP:_____  P:____  R:____ T:_____ O2 Sat: ___%  O2: ______    Active Orders   Diet    DIET REGULAR         Orientation: oriented to time, place, person and situation     Active Behaviors: None                                   Active Lines/Drains:  (Peg Tube / King / CL or S/L?): no    Urinary Status: Voiding     Last BM: Last Bowel Movement Date: 02/26/17     Skin Integrity: Incision (comment)   Wound Leg Left; Lower-DRESSING STATUS: Clean, dry, and intact    Wound Leg Left; Lower-DRESSING TYPE: Elastic bandage    Mobility: Slightly limited   Weight Bearing Status: WBAT (Weight Bearing as Tolerated)                Lab Results   Component Value Date/Time    Glucose 114 02/27/2017 03:40 AM    INR 4.7 08/28/2010 06:50 AM    INR 4.8 08/26/2010 05:35 AM    HGB 10.1 02/27/2017 03:40 AM    HGB 9.6 02/26/2017 04:10 AM        RECOMMENDATION     See After Visit Summary (AVS) for:  · Discharge instructions  · After [address]   · Special equipment needed (entered pre-discharge by Care Management)  · Medication Reconciliation    · Follow up Appointment(s)         Report given/sent by:  Sheila Nicholson RN                    Verbal report given to: Stephen Lazo RN  FAXED to:   [facility]         Estimated discharge time:  2/27/2017 at 1530

## 2017-11-14 RX ORDER — ZOLEDRONIC ACID 5 MG/100ML
5 INJECTION, SOLUTION INTRAVENOUS ONCE
Status: DISPENSED | OUTPATIENT
Start: 2017-11-20 | End: 2017-11-20

## 2017-11-20 ENCOUNTER — HOSPITAL ENCOUNTER (OUTPATIENT)
Dept: INFUSION THERAPY | Age: 77
Discharge: HOME | End: 2017-11-20

## 2019-08-09 ENCOUNTER — HOSPITAL ENCOUNTER (INPATIENT)
Age: 79
LOS: 7 days | Discharge: NURSING, REHAB OR LONG-TERM CARE | DRG: 067 | End: 2019-08-16
Attending: EMERGENCY MEDICINE | Admitting: HOSPITALIST
Payer: MEDICARE

## 2019-08-09 ENCOUNTER — APPOINTMENT (OUTPATIENT)
Dept: GENERAL RADIOLOGY | Age: 79
DRG: 067 | End: 2019-08-09
Attending: EMERGENCY MEDICINE
Payer: MEDICARE

## 2019-08-09 ENCOUNTER — APPOINTMENT (OUTPATIENT)
Dept: CT IMAGING | Age: 79
DRG: 067 | End: 2019-08-09
Attending: EMERGENCY MEDICINE
Payer: MEDICARE

## 2019-08-09 DIAGNOSIS — N30.00 ACUTE CYSTITIS WITHOUT HEMATURIA: ICD-10-CM

## 2019-08-09 DIAGNOSIS — E83.52 HYPERCALCEMIA: ICD-10-CM

## 2019-08-09 DIAGNOSIS — I63.9 CEREBROVASCULAR ACCIDENT (CVA), UNSPECIFIED MECHANISM (HCC): Primary | ICD-10-CM

## 2019-08-09 LAB
ALBUMIN SERPL-MCNC: 3.3 G/DL (ref 3.4–5)
ALBUMIN/GLOB SERPL: 0.9 {RATIO} (ref 0.8–1.7)
ALP SERPL-CCNC: 114 U/L (ref 45–117)
ALT SERPL-CCNC: 11 U/L (ref 13–56)
AMORPH CRY URNS QL MICRO: ABNORMAL
ANION GAP SERPL CALC-SCNC: 7 MMOL/L (ref 3–18)
APPEARANCE UR: ABNORMAL
AST SERPL-CCNC: 25 U/L (ref 10–38)
BACTERIA URNS QL MICRO: ABNORMAL /HPF
BASOPHILS # BLD: 0 K/UL (ref 0–0.1)
BASOPHILS NFR BLD: 0 % (ref 0–2)
BILIRUB SERPL-MCNC: 0.5 MG/DL (ref 0.2–1)
BILIRUB UR QL: NEGATIVE
BUN SERPL-MCNC: 23 MG/DL (ref 7–18)
BUN/CREAT SERPL: 21 (ref 12–20)
CALCIUM SERPL-MCNC: 14.8 MG/DL (ref 8.5–10.1)
CAOX CRY URNS QL MICRO: ABNORMAL
CHLORIDE SERPL-SCNC: 105 MMOL/L (ref 100–111)
CK MB CFR SERPL CALC: 4.3 % (ref 0–4)
CK MB SERPL-MCNC: 1.8 NG/ML (ref 5–25)
CK SERPL-CCNC: 42 U/L (ref 26–192)
CO2 SERPL-SCNC: 33 MMOL/L (ref 21–32)
COLOR UR: YELLOW
CREAT SERPL-MCNC: 1.1 MG/DL (ref 0.6–1.3)
DIFFERENTIAL METHOD BLD: ABNORMAL
EOSINOPHIL # BLD: 0.1 K/UL (ref 0–0.4)
EOSINOPHIL NFR BLD: 1 % (ref 0–5)
EPITH CASTS URNS QL MICRO: ABNORMAL /LPF (ref 0–5)
ERYTHROCYTE [DISTWIDTH] IN BLOOD BY AUTOMATED COUNT: 13 % (ref 11.6–14.5)
GLOBULIN SER CALC-MCNC: 3.6 G/DL (ref 2–4)
GLUCOSE SERPL-MCNC: 108 MG/DL (ref 74–99)
GLUCOSE UR STRIP.AUTO-MCNC: NEGATIVE MG/DL
HCT VFR BLD AUTO: 42.5 % (ref 35–45)
HGB BLD-MCNC: 14.1 G/DL (ref 12–16)
HGB UR QL STRIP: ABNORMAL
KETONES UR QL STRIP.AUTO: NEGATIVE MG/DL
LACTATE BLD-SCNC: 1.81 MMOL/L (ref 0.4–2)
LEUKOCYTE ESTERASE UR QL STRIP.AUTO: ABNORMAL
LYMPHOCYTES # BLD: 1.4 K/UL (ref 0.9–3.6)
LYMPHOCYTES NFR BLD: 11 % (ref 21–52)
MCH RBC QN AUTO: 31.8 PG (ref 24–34)
MCHC RBC AUTO-ENTMCNC: 33.2 G/DL (ref 31–37)
MCV RBC AUTO: 95.9 FL (ref 74–97)
MONOCYTES # BLD: 0.8 K/UL (ref 0.05–1.2)
MONOCYTES NFR BLD: 7 % (ref 3–10)
NEUTS SEG # BLD: 9.8 K/UL (ref 1.8–8)
NEUTS SEG NFR BLD: 81 % (ref 40–73)
NITRITE UR QL STRIP.AUTO: POSITIVE
PH UR STRIP: 6.5 [PH] (ref 5–8)
PLATELET # BLD AUTO: 179 K/UL (ref 135–420)
PMV BLD AUTO: 11.7 FL (ref 9.2–11.8)
POTASSIUM SERPL-SCNC: 3.2 MMOL/L (ref 3.5–5.5)
PROT SERPL-MCNC: 6.9 G/DL (ref 6.4–8.2)
PROT UR STRIP-MCNC: ABNORMAL MG/DL
RBC # BLD AUTO: 4.43 M/UL (ref 4.2–5.3)
RBC #/AREA URNS HPF: ABNORMAL /HPF (ref 0–5)
SODIUM SERPL-SCNC: 145 MMOL/L (ref 136–145)
SP GR UR REFRACTOMETRY: 1.01 (ref 1–1.03)
TROPONIN I SERPL-MCNC: 0.06 NG/ML (ref 0–0.04)
TROPONIN I SERPL-MCNC: 0.09 NG/ML (ref 0–0.04)
UROBILINOGEN UR QL STRIP.AUTO: 1 EU/DL (ref 0.2–1)
WBC # BLD AUTO: 12.1 K/UL (ref 4.6–13.2)
WBC URNS QL MICRO: ABNORMAL /HPF (ref 0–4)

## 2019-08-09 PROCEDURE — 74011250637 HC RX REV CODE- 250/637: Performed by: EMERGENCY MEDICINE

## 2019-08-09 PROCEDURE — 93005 ELECTROCARDIOGRAM TRACING: CPT

## 2019-08-09 PROCEDURE — 81001 URINALYSIS AUTO W/SCOPE: CPT

## 2019-08-09 PROCEDURE — 70450 CT HEAD/BRAIN W/O DYE: CPT

## 2019-08-09 PROCEDURE — 82550 ASSAY OF CK (CPK): CPT

## 2019-08-09 PROCEDURE — 74011000258 HC RX REV CODE- 258: Performed by: EMERGENCY MEDICINE

## 2019-08-09 PROCEDURE — 71045 X-RAY EXAM CHEST 1 VIEW: CPT

## 2019-08-09 PROCEDURE — 87040 BLOOD CULTURE FOR BACTERIA: CPT

## 2019-08-09 PROCEDURE — 96366 THER/PROPH/DIAG IV INF ADDON: CPT

## 2019-08-09 PROCEDURE — 85025 COMPLETE CBC W/AUTO DIFF WBC: CPT

## 2019-08-09 PROCEDURE — 74011250636 HC RX REV CODE- 250/636: Performed by: EMERGENCY MEDICINE

## 2019-08-09 PROCEDURE — 83605 ASSAY OF LACTIC ACID: CPT

## 2019-08-09 PROCEDURE — 96361 HYDRATE IV INFUSION ADD-ON: CPT

## 2019-08-09 PROCEDURE — 51701 INSERT BLADDER CATHETER: CPT

## 2019-08-09 PROCEDURE — 65270000029 HC RM PRIVATE

## 2019-08-09 PROCEDURE — 99285 EMERGENCY DEPT VISIT HI MDM: CPT

## 2019-08-09 PROCEDURE — 96367 TX/PROPH/DG ADDL SEQ IV INF: CPT

## 2019-08-09 PROCEDURE — 80053 COMPREHEN METABOLIC PANEL: CPT

## 2019-08-09 PROCEDURE — 77030005563 HC CATH URETH INT MMGH -A

## 2019-08-09 PROCEDURE — 96368 THER/DIAG CONCURRENT INF: CPT

## 2019-08-09 PROCEDURE — 96365 THER/PROPH/DIAG IV INF INIT: CPT

## 2019-08-09 RX ORDER — SODIUM CHLORIDE 0.9 % (FLUSH) 0.9 %
5-10 SYRINGE (ML) INJECTION AS NEEDED
Status: DISCONTINUED | OUTPATIENT
Start: 2019-08-09 | End: 2019-08-16 | Stop reason: HOSPADM

## 2019-08-09 RX ORDER — POTASSIUM CHLORIDE 20 MEQ/1
40 TABLET, EXTENDED RELEASE ORAL
Status: DISCONTINUED | OUTPATIENT
Start: 2019-08-09 | End: 2019-08-09

## 2019-08-09 RX ORDER — LEVOFLOXACIN 5 MG/ML
750 INJECTION, SOLUTION INTRAVENOUS EVERY 24 HOURS
Status: DISCONTINUED | OUTPATIENT
Start: 2019-08-09 | End: 2019-08-09

## 2019-08-09 RX ORDER — ASPIRIN 300 MG/1
300 SUPPOSITORY RECTAL
Status: ACTIVE | OUTPATIENT
Start: 2019-08-09 | End: 2019-08-10

## 2019-08-09 RX ORDER — LEVOFLOXACIN 5 MG/ML
750 INJECTION, SOLUTION INTRAVENOUS
Status: DISCONTINUED | OUTPATIENT
Start: 2019-08-11 | End: 2019-08-13

## 2019-08-09 RX ORDER — SODIUM CHLORIDE 9 MG/ML
125 INJECTION, SOLUTION INTRAVENOUS ONCE
Status: COMPLETED | OUTPATIENT
Start: 2019-08-09 | End: 2019-08-09

## 2019-08-09 RX ORDER — ACETAMINOPHEN 325 MG/1
650 TABLET ORAL ONCE
Status: DISPENSED | OUTPATIENT
Start: 2019-08-09 | End: 2019-08-10

## 2019-08-09 RX ORDER — ACETAMINOPHEN 650 MG/1
650 SUPPOSITORY RECTAL
Status: COMPLETED | OUTPATIENT
Start: 2019-08-09 | End: 2019-08-09

## 2019-08-09 RX ORDER — POTASSIUM CHLORIDE 7.45 MG/ML
10 INJECTION INTRAVENOUS
Status: DISPENSED | OUTPATIENT
Start: 2019-08-09 | End: 2019-08-10

## 2019-08-09 RX ADMIN — LEVOFLOXACIN 750 MG: 5 INJECTION, SOLUTION INTRAVENOUS at 20:32

## 2019-08-09 RX ADMIN — SODIUM CHLORIDE 500 ML: 900 INJECTION, SOLUTION INTRAVENOUS at 19:32

## 2019-08-09 RX ADMIN — SODIUM CHLORIDE 500 ML: 900 INJECTION, SOLUTION INTRAVENOUS at 23:05

## 2019-08-09 RX ADMIN — POTASSIUM CHLORIDE 10 MEQ: 7.46 INJECTION, SOLUTION INTRAVENOUS at 21:27

## 2019-08-09 RX ADMIN — SODIUM CHLORIDE 1000 MG: 900 INJECTION, SOLUTION INTRAVENOUS at 21:23

## 2019-08-09 RX ADMIN — SODIUM CHLORIDE 125 ML/HR: 900 INJECTION, SOLUTION INTRAVENOUS at 23:57

## 2019-08-09 RX ADMIN — PIPERACILLIN, TAZOBACTAM 4.5 G: 4; .5 INJECTION, POWDER, LYOPHILIZED, FOR SOLUTION INTRAVENOUS at 19:36

## 2019-08-09 RX ADMIN — POTASSIUM CHLORIDE 10 MEQ: 7.46 INJECTION, SOLUTION INTRAVENOUS at 23:53

## 2019-08-09 RX ADMIN — SODIUM CHLORIDE 1000 ML: 900 INJECTION, SOLUTION INTRAVENOUS at 19:32

## 2019-08-09 RX ADMIN — POTASSIUM CHLORIDE 10 MEQ: 7.46 INJECTION, SOLUTION INTRAVENOUS at 22:34

## 2019-08-09 RX ADMIN — ACETAMINOPHEN 650 MG: 650 SUPPOSITORY RECTAL at 20:31

## 2019-08-09 NOTE — ED NOTES
Shift change report received from [facility]. [address] assumed care of pt.

## 2019-08-09 NOTE — ED PROVIDER NOTES
Emergency Department Treatment Report    Patient: Taylor Reason Age: 70 y.o. Sex: female    YOB: 1940 Admit Date: 8/9/2019 PCP: Donell Bob MD   MRN: [id number]  CSN: [id number]     Room: 09/09 Time Dictated: 6:48 PM      Chief Complaint   Chief Complaint   Patient presents with    Dental Pain    Lethargy       History of Present Illness   70 y.o. female, PMH RA, presents by EMS with 2 weeks of feeling \"sick. \"  She mentions that she is having subjective fevers and body aches. EMS noticed that she has a right-sided facial droop, but unable to determine last known normal.  Patient is a poor historian and continues to mention that her dentures are hurting her mouth. Daughter is in the ER and the patient does not normally have a right-sided facial droop. She was last seen without a facial droop over 48 hours ago. In addition, the patient was having some dysuria and admits to not eating or drinking much over the past few days. She denies cough, shortness of breath, nausea, vomiting, focal numbness, tingling, rash, difficulty speaking, ataxia or blurry vision. Review of Systems   Constitutional: No fever, chills, or weight loss  Eyes: No visual symptoms. ENT: No sore throat, runny nose or ear pain. Respiratory: No cough, dyspnea or wheezing. Cardiovascular: No chest pain, pressure, palpitations, tightness or heaviness. Gastrointestinal: No vomiting, diarrhea or abdominal pain. Genitourinary: +dysuria  Musculoskeletal: No joint pain or swelling. Integumentary: No rashes. Neurological: No headaches or sensory symptoms. +right-sided facial droop  Denies complaints in all other systems.     Past Medical/Surgical History     Past Medical History:   Diagnosis Date    Rheumatoid arthritis, adult ([facility]     Tachycardia      Past Surgical History:   Procedure Laterality Date    HX CAROTID ENDARTERECTOMY      left    HX CATARACT REMOVAL      bilateral    HX HIP REPLACEMENT left       Social History     Social History     Socioeconomic History    Marital status:      Spouse name: Not on file    Number of children: Not on file    Years of education: Not on file    Highest education level: Not on file   Tobacco Use    Smoking status: Current Every Day Smoker     Packs/day: 1.50    Smokeless tobacco: Never Used   Substance and Sexual Activity    Alcohol use: No    Drug use: No       Family History   History reviewed. No pertinent family history. Home Medications     Prior to Admission Medications   Prescriptions Last Dose Informant Patient Reported? Taking? HYDROcodone-acetaminophen 5-500 mg cap   Yes No   Sig: Take 5 mg by mouth. acetaminophen (TYLENOL) 325 mg tablet   Yes No   Sig: Take  by mouth every four (4) hours as needed for Pain.   enoxaparin (LOVENOX) 30 mg/0.3 mL injection   No No   Si.3 mL by SubCUTAneous route daily. ergocalciferol (VITAMIN D2) 50,000 unit capsule   Yes No   Sig: Take 50,000 Units by mouth every seven (7) days. folic acid (FOLVITE) 1 mg tablet   Yes No   Sig: Take 1 mg by mouth two (2) times a day. glucosamine (GLUCOSAMINE RELIEF) 1,000 mg tab   Yes No   Sig: Take 1,500 mg by mouth. methotrexate (RHEUMATREX) 2.5 mg tablet   Yes No   Sig: Take 2.5 mg by mouth Every Friday. omega-3 fatty acids-vitamin e (FISH OIL) 1,000 mg cap   Yes No   Sig: Take 1 Cap by mouth. oxyCODONE-acetaminophen (PERCOCET 7.5) 7.5-325 mg per tablet   No No   Sig: Take 1 Tab by mouth every four (4) hours as needed. Max Daily Amount: 6 Tabs. pravastatin (PRAVACHOL) 20 mg tablet   Yes No   Sig: Take 20 mg by mouth nightly.       Facility-Administered Medications: None       Allergies     Allergies   Allergen Reactions    Aspirin Unknown (comments)    Codeine Unknown (comments)       Physical Exam     ED Triage Vitals   ED Encounter Vitals Group      BP 19 1823 (!) 83/58      Pulse (Heart Rate) 19 1823 93      Resp Rate 19 1823 26 Temp 08/09/19 1836 97.9 °F (36.6 °C)      Temp src --       O2 Sat (%) 08/09/19 1823 93 %      Weight 08/09/19 1836 88 lb      Height 08/09/19 1836 4' 10\"     Constitutional: Patient appears elderly and chronically ill. No acute distress. HEENT: Conjunctiva clear. PERRLA. Mucous membranes moist, non-erythematous. Surface of the pharynx, palate, and tongue are pink, moist and without lesions. Neck: supple, non tender, symmetrical, no masses or JVD. Respiratory: lungs clear to auscultation, nonlabored respirations. No tachypnea or accessory muscle use. Cardiovascular: heart regular rate and rhythm. Calves soft and non-tender. Distal pulses 2+ and equal bilaterally. No peripheral edema. Gastrointestinal:  Abdomen soft, nontender without complaint of pain to palpation  Musculoskeletal: Nail beds pink with prompt capillary refill  Integumentary: warm and dry without rashes or lesions  Neurologic: alert and oriented x2 (not to month), Sensation intact, mild right arm drift, mild dysarthria.  +right-sided facial droop    Diagnostic Studies   Lab:   Recent Results (from the past 12 hour(s))   EKG, 12 LEAD, INITIAL    Collection Time: 08/09/19  6:28 PM   Result Value Ref Range    Ventricular Rate 94 BPM    Atrial Rate 94 BPM    P-R Interval 146 ms    QRS Duration 124 ms    Q-T Interval 376 ms    QTC Calculation (Bezet) 470 ms    Calculated P Axis 80 degrees    Calculated R Axis 20 degrees    Calculated T Axis 120 degrees    Diagnosis       Normal sinus rhythm  Biatrial enlargement  Left bundle branch block  Abnormal ECG  When compared with ECG of 21-FEB-2017 19:53,  Left bundle branch block is now present  Criteria for Anterior infarct are no longer present     POC LACTIC ACID    Collection Time: 08/09/19  6:47 PM   Result Value Ref Range    Lactic Acid (POC) 1.81 0.40 - 2.00 mmol/L   CULTURE, BLOOD    Collection Time: 08/09/19  6:48 PM   Result Value Ref Range    Special Requests: PERIPHERAL      Culture result: PENDING    METABOLIC PANEL, COMPREHENSIVE    Collection Time: 08/09/19  6:48 PM   Result Value Ref Range    Sodium 145 136 - 145 mmol/L    Potassium 3.2 (L) 3.5 - 5.5 mmol/L    Chloride 105 100 - 111 mmol/L    CO2 33 (H) 21 - 32 mmol/L    Anion gap 7 3.0 - 18 mmol/L    Glucose 108 (H) 74 - 99 mg/dL    BUN 23 (H) 7.0 - 18 MG/DL    Creatinine 1.10 0.6 - 1.3 MG/DL    BUN/Creatinine ratio 21 (H) 12 - 20      GFR est AA 59 (L) >60 ml/min/1.73m2    GFR est non-AA 48 (L) >60 ml/min/1.73m2    Calcium 14.8 (HH) 8.5 - 10.1 MG/DL    Bilirubin, total 0.5 0.2 - 1.0 MG/DL    ALT (SGPT) 11 (L) 13 - 56 U/L    AST (SGOT) 25 10 - 38 U/L    Alk. phosphatase 114 45 - 117 U/L    Protein, total 6.9 6.4 - 8.2 g/dL    Albumin 3.3 (L) 3.4 - 5.0 g/dL    Globulin 3.6 2.0 - 4.0 g/dL    A-G Ratio 0.9 0.8 - 1.7     CBC WITH AUTOMATED DIFF    Collection Time: 08/09/19  6:48 PM   Result Value Ref Range    WBC 12.1 4.6 - 13.2 K/uL    RBC 4.43 4.20 - 5.30 M/uL    HGB 14.1 12.0 - 16.0 g/dL    HCT 42.5 35.0 - 45.0 %    MCV 95.9 74.0 - 97.0 FL    MCH 31.8 24.0 - 34.0 PG    MCHC 33.2 31.0 - 37.0 g/dL    RDW 13.0 11.6 - 14.5 %    PLATELET 980 860 - 423 K/uL    MPV 11.7 9.2 - 11.8 FL    NEUTROPHILS 81 (H) 40 - 73 %    LYMPHOCYTES 11 (L) 21 - 52 %    MONOCYTES 7 3 - 10 %    EOSINOPHILS 1 0 - 5 %    BASOPHILS 0 0 - 2 %    ABS. NEUTROPHILS 9.8 (H) 1.8 - 8.0 K/UL    ABS. LYMPHOCYTES 1.4 0.9 - 3.6 K/UL    ABS. MONOCYTES 0.8 0.05 - 1.2 K/UL    ABS. EOSINOPHILS 0.1 0.0 - 0.4 K/UL    ABS.  BASOPHILS 0.0 0.0 - 0.1 K/UL    DF AUTOMATED     CARDIAC PANEL,(CK, CKMB & TROPONIN)    Collection Time: 08/09/19  6:48 PM   Result Value Ref Range    CK 42 26 - 192 U/L    CK - MB 1.8 <3.6 ng/ml    CK-MB Index 4.3 (H) 0.0 - 4.0 %    Troponin-I, QT 0.06 (H) 0.0 - 0.045 NG/ML   CULTURE, BLOOD    Collection Time: 08/09/19  6:58 PM   Result Value Ref Range    Special Requests: PERIPHERAL      Culture result: PENDING    URINALYSIS W/ RFLX MICROSCOPIC    Collection Time: 08/09/19  8:15 PM   Result Value Ref Range    Color YELLOW      Appearance TURBID      Specific gravity 1.014 1.005 - 1.030      pH (UA) 6.5 5.0 - 8.0      Protein TRACE (A) NEG mg/dL    Glucose NEGATIVE  NEG mg/dL    Ketone NEGATIVE  NEG mg/dL    Bilirubin NEGATIVE  NEG      Blood LARGE (A) NEG      Urobilinogen 1.0 0.2 - 1.0 EU/dL    Nitrites POSITIVE (A) NEG      Leukocyte Esterase LARGE (A) NEG     URINE MICROSCOPIC ONLY    Collection Time: 08/09/19  8:15 PM   Result Value Ref Range    WBC 21 to 35 0 - 4 /hpf    RBC 4 to 10 0 - 5 /hpf    Epithelial cells FEW 0 - 5 /lpf    Bacteria 2+ (A) NEG /hpf    Amorphous Crystals 3+ (A) NEG    CA Oxalate crystals FEW (A) NEG     TROPONIN I    Collection Time: 08/09/19  9:30 PM   Result Value Ref Range    Troponin-I, QT 0.09 (H) 0.0 - 0.045 NG/ML       Imaging:    No results found.     ED Course/Medical Decision Making   Patient arrives to the ER initially hypotensive. Sepsis protocol initiated. Blood pressure improved with IV fluids and lactate is below 2. She is also found to have a right-sided facial droop, but no other focal neuro symptoms. Her last known normal without a facial droop was over 48 hours ago. Head CT obtained and consultation with telemetry neurology. Dr. Hallie Nicolas (Tele-Neurology) suspects a likely subacute infarction in the left hemisphere. No tPa because she is outside the therapeutic window. She recommends admission and further evaluation and treatment, including MRI brain, MRA neck and brain and IV fluids. Patient has an unknown aspirin allergy, but she was told not to take aspirin 25 years ago for an unknown reason. Spoke about risks and benefits of aspirin and she elects to not take aspirin. Labs are also remarkable for hypercalcemia and her urine is suggestive of a UTI.   With her dysuria, will continue to treat her for a UTI.    10:45 PM: Spoke to Dr. Hugo Campbell (Cardiology) about mildly rising troponins with an incomplete left bundle branch block without Scarbossa criteria. At this time, he does not suggest any anticoagulation without any chest pain or any other symptoms of ACS. He does recommend continuing to trend troponins. An acute CVA may cause mild elevation of the troponins. 11:10 PM: Spoke to Dr. Venu Houston and he has accepted the patient for admission. Critical Care Time:  The services I provided to this patient were to treat and/or prevent clinically significant deterioration that could result in the failure of one or more body systems and/or organ systems due to CVA, elevated troponins. Services included the following:  -reviewing nursing notes and old charts  -vital sign assessments  -direct patient care  -medication orders and management  -interpreting and reviewing diagnostic studies/labs  -re-evaluations  -documentation time    Aggregate critical care time was 45 minutes, which includes only time during which I was engaged in work directly related to the patient's care as described above, whether I was at bedside or elsewhere in the Emergency Department. It did not include time spent performing other reported procedures or the services of residents, students, nurses, or advance practice providers.        Madison Hutchins MD    11:21 PM      Medications   sodium chloride (NS) flush 5-10 mL (has no administration in time range)   vancomycin (VANCOCIN) 1,000 mg in 0.9% sodium chloride (MBP/ADV) 250 mL adv (1,000 mg IntraVENous New Bag 8/9/19 2123)   acetaminophen (TYLENOL) tablet 650 mg (has no administration in time range)   potassium chloride 10 mEq in 100 ml IVPB (10 mEq IntraVENous New Bag 8/9/19 2234)   aspirin (ASA) suppository 300 mg (0 mg Rectal Held 8/9/19 2125)   0.9% sodium chloride infusion (has no administration in time range)   sodium chloride 0.9 % bolus infusion 500 mL (has no administration in time range)   piperacillin-tazobactam (ZOSYN) 3.375 g in 0.9% sodium chloride (MBP/ADV) 100 mL MBP (has no administration in time range)   levoFLOXacin (LEVAQUIN) 750 mg in D5W IVPB (has no administration in time range)   sodium chloride 0.9 % bolus infusion 1,000 mL (0 mL IntraVENous IV Completed 8/9/19 2206)   sodium chloride 0.9 % bolus infusion 500 mL (0 mL IntraVENous IV Completed 8/9/19 2031)   acetaminophen (TYLENOL) suppository 650 mg (650 mg Rectal Given 8/9/19 2031)       Final Diagnosis       ICD-10-CM ICD-9-CM   1. Cerebrovascular accident (CVA), unspecified mechanism ([facility] I63.9 434.91   2. Acute cystitis without hematuria N30.00 595.0   3. Hypercalcemia E83.52 275.42       Disposition   Patient is admitted. My Medications      ASK your doctor about these medications      Instructions Each Dose to Equal Morning Noon Evening Bedtime   enoxaparin 30 mg/0.3 mL injection  Commonly known as:  LOVENOX    Your last dose was: Your next dose is:          0.3 mL by SubCUTAneous route daily. 30 mg                 FISH OIL 1,000 mg Cap  Generic drug:  omega-3 fatty acids-vitamin e    Your last dose was: Your next dose is: Take 1 Cap by mouth. 1 Cap                 folic acid 1 mg tablet  Commonly known as:  FOLVITE    Your last dose was: Your next dose is: Take 1 mg by mouth two (2) times a day. 1 mg                 GLUCOSAMINE RELIEF 1,000 mg Tab  Generic drug:  glucosamine    Your last dose was: Your next dose is: Take 1,500 mg by mouth.   1,500 mg                 HYDROcodone-acetaminophen 5-500 mg Cap    Your last dose was: Your next dose is: Take 5 mg by mouth. 5 mg                 methotrexate 2.5 mg tablet  Commonly known as:  [address]    Your last dose was: Your next dose is: Take 2.5 mg by mouth Every Friday. 2.5 mg                 oxyCODONE-acetaminophen 7.5-325 mg per tablet  Commonly known as:  PERCOCET 7.5    Your last dose was: Your next dose is: Take 1 Tab by mouth every four (4) hours as needed.  Max Daily Amount: 6 Tabs.   1 Tab                 pravastatin 20 mg tablet  Commonly known as:  PRAVACHOL    Your last dose was: Your next dose is: Take 20 mg by mouth nightly. 20 mg                 TYLENOL 325 mg tablet  Generic drug:  acetaminophen    Your last dose was: Your next dose is: Take  by mouth every four (4) hours as needed for Pain. VITAMIN D2 50,000 unit capsule  Generic drug:  ergocalciferol    Your last dose was: Your next dose is: Take 50,000 Units by mouth every seven (7) days. 50,000 Units                          Gabriel Islas MD  August 9, 2019    My signature above authenticates this document and my orders, the final    diagnosis (es), discharge prescription (s), and instructions in the Epic    record. If you have any questions please contact [phone]. Nursing notes have been reviewed by the physician/ advanced practice    Clinician. Disclaimer: Sections of this note are dictated using utilizing voice recognition software. Minor typographical errors may be present. If questions arise, please do not hesitate to contact me or call our department.

## 2019-08-09 NOTE — ED TRIAGE NOTES
Pt arrived from home via EMS after neighbor called due to percieved mental status changes. Patient states her mouth hurts. Unable to wear dentures.

## 2019-08-10 ENCOUNTER — APPOINTMENT (OUTPATIENT)
Dept: NON INVASIVE DIAGNOSTICS | Age: 79
DRG: 067 | End: 2019-08-10
Attending: HOSPITALIST
Payer: MEDICARE

## 2019-08-10 LAB
ANION GAP SERPL CALC-SCNC: 6 MMOL/L (ref 3–18)
BASOPHILS # BLD: 0 K/UL (ref 0–0.1)
BASOPHILS NFR BLD: 0 % (ref 0–2)
BUN SERPL-MCNC: 18 MG/DL (ref 7–18)
BUN/CREAT SERPL: 19 (ref 12–20)
CALCIUM SERPL-MCNC: 12.1 MG/DL (ref 8.5–10.1)
CHLORIDE SERPL-SCNC: 114 MMOL/L (ref 100–111)
CO2 SERPL-SCNC: 27 MMOL/L (ref 21–32)
CREAT SERPL-MCNC: 0.95 MG/DL (ref 0.6–1.3)
DIFFERENTIAL METHOD BLD: ABNORMAL
EOSINOPHIL # BLD: 0.1 K/UL (ref 0–0.4)
EOSINOPHIL NFR BLD: 1 % (ref 0–5)
ERYTHROCYTE [DISTWIDTH] IN BLOOD BY AUTOMATED COUNT: 13.4 % (ref 11.6–14.5)
GLUCOSE SERPL-MCNC: 107 MG/DL (ref 74–99)
HCT VFR BLD AUTO: 34 % (ref 35–45)
HGB BLD-MCNC: 10.7 G/DL (ref 12–16)
LYMPHOCYTES # BLD: 1 K/UL (ref 0.9–3.6)
LYMPHOCYTES NFR BLD: 13 % (ref 21–52)
MCH RBC QN AUTO: 30.9 PG (ref 24–34)
MCHC RBC AUTO-ENTMCNC: 31.5 G/DL (ref 31–37)
MCV RBC AUTO: 98.3 FL (ref 74–97)
MONOCYTES # BLD: 0.6 K/UL (ref 0.05–1.2)
MONOCYTES NFR BLD: 7 % (ref 3–10)
NEUTS SEG # BLD: 6.2 K/UL (ref 1.8–8)
NEUTS SEG NFR BLD: 79 % (ref 40–73)
PLATELET # BLD AUTO: 127 K/UL (ref 135–420)
PMV BLD AUTO: 11.4 FL (ref 9.2–11.8)
POTASSIUM SERPL-SCNC: 3.4 MMOL/L (ref 3.5–5.5)
RBC # BLD AUTO: 3.46 M/UL (ref 4.2–5.3)
SODIUM SERPL-SCNC: 147 MMOL/L (ref 136–145)
WBC # BLD AUTO: 7.9 K/UL (ref 4.6–13.2)

## 2019-08-10 PROCEDURE — 85025 COMPLETE CBC W/AUTO DIFF WBC: CPT

## 2019-08-10 PROCEDURE — 97166 OT EVAL MOD COMPLEX 45 MIN: CPT

## 2019-08-10 PROCEDURE — 74011000258 HC RX REV CODE- 258: Performed by: NURSE PRACTITIONER

## 2019-08-10 PROCEDURE — 74011000258 HC RX REV CODE- 258: Performed by: HOSPITALIST

## 2019-08-10 PROCEDURE — 65270000029 HC RM PRIVATE

## 2019-08-10 PROCEDURE — 36415 COLL VENOUS BLD VENIPUNCTURE: CPT

## 2019-08-10 PROCEDURE — 74011250636 HC RX REV CODE- 250/636: Performed by: NURSE PRACTITIONER

## 2019-08-10 PROCEDURE — 92610 EVALUATE SWALLOWING FUNCTION: CPT

## 2019-08-10 PROCEDURE — 97162 PT EVAL MOD COMPLEX 30 MIN: CPT

## 2019-08-10 PROCEDURE — 92526 ORAL FUNCTION THERAPY: CPT

## 2019-08-10 PROCEDURE — 80048 BASIC METABOLIC PNL TOTAL CA: CPT

## 2019-08-10 PROCEDURE — 74011250636 HC RX REV CODE- 250/636: Performed by: HOSPITALIST

## 2019-08-10 PROCEDURE — 97530 THERAPEUTIC ACTIVITIES: CPT

## 2019-08-10 PROCEDURE — 93306 TTE W/DOPPLER COMPLETE: CPT

## 2019-08-10 PROCEDURE — 97535 SELF CARE MNGMENT TRAINING: CPT

## 2019-08-10 RX ORDER — ENOXAPARIN SODIUM 100 MG/ML
40 INJECTION SUBCUTANEOUS EVERY 24 HOURS
Status: DISCONTINUED | OUTPATIENT
Start: 2019-08-10 | End: 2019-08-16 | Stop reason: HOSPADM

## 2019-08-10 RX ORDER — ENOXAPARIN SODIUM 100 MG/ML
30 INJECTION SUBCUTANEOUS DAILY
Status: DISCONTINUED | OUTPATIENT
Start: 2019-08-10 | End: 2019-08-10

## 2019-08-10 RX ORDER — POTASSIUM CHLORIDE 7.45 MG/ML
10 INJECTION INTRAVENOUS ONCE
Status: COMPLETED | OUTPATIENT
Start: 2019-08-10 | End: 2019-08-10

## 2019-08-10 RX ORDER — SODIUM CHLORIDE 0.9 % (FLUSH) 0.9 %
5-40 SYRINGE (ML) INJECTION AS NEEDED
Status: DISCONTINUED | OUTPATIENT
Start: 2019-08-10 | End: 2019-08-16 | Stop reason: HOSPADM

## 2019-08-10 RX ORDER — SODIUM CHLORIDE 0.9 % (FLUSH) 0.9 %
5-40 SYRINGE (ML) INJECTION EVERY 8 HOURS
Status: DISCONTINUED | OUTPATIENT
Start: 2019-08-10 | End: 2019-08-16 | Stop reason: HOSPADM

## 2019-08-10 RX ADMIN — ENOXAPARIN SODIUM 40 MG: 40 INJECTION SUBCUTANEOUS at 14:40

## 2019-08-10 RX ADMIN — Medication 10 ML: at 23:29

## 2019-08-10 RX ADMIN — POTASSIUM CHLORIDE 10 MEQ: 7.46 INJECTION, SOLUTION INTRAVENOUS at 02:23

## 2019-08-10 RX ADMIN — PIPERACILLIN SODIUM,TAZOBACTAM SODIUM 3.38 G: 3; .375 INJECTION, POWDER, FOR SOLUTION INTRAVENOUS at 07:59

## 2019-08-10 RX ADMIN — PIPERACILLIN SODIUM,TAZOBACTAM SODIUM 3.38 G: 3; .375 INJECTION, POWDER, FOR SOLUTION INTRAVENOUS at 23:28

## 2019-08-10 RX ADMIN — Medication 10 ML: at 13:00

## 2019-08-10 RX ADMIN — Medication 10 ML: at 16:53

## 2019-08-10 RX ADMIN — PIPERACILLIN SODIUM,TAZOBACTAM SODIUM 3.38 G: 3; .375 INJECTION, POWDER, FOR SOLUTION INTRAVENOUS at 02:07

## 2019-08-10 RX ADMIN — PIPERACILLIN SODIUM,TAZOBACTAM SODIUM 3.38 G: 3; .375 INJECTION, POWDER, FOR SOLUTION INTRAVENOUS at 14:35

## 2019-08-10 NOTE — PROGRESS NOTES
0045: Per patient's request this nurse spoke with family at bedside Daughter Beatriz Herrera , phone no. [phone] regarding patient's admission to [facility]. All questions answered at this time.  ,

## 2019-08-10 NOTE — PROGRESS NOTES
Problem: Pressure Injury - Risk of  Goal: *Prevention of pressure injury  Description  Document Bassem Scale and appropriate interventions in the flowsheet. Outcome: Progressing Towards Goal  Note:   Pressure Injury Interventions:  Sensory Interventions: Assess changes in LOC, Minimize linen layers, Avoid rigorous massage over bony prominences         Activity Interventions: Pressure redistribution bed/mattress(bed type)    Mobility Interventions: PT/OT evaluation    Nutrition Interventions: Document food/fluid/supplement intake    Friction and Shear Interventions: Apply protective barrier, creams and emollients, Minimize layers                Problem: Patient Education: Go to Patient Education Activity  Goal: Patient/Family Education  Outcome: Progressing Towards Goal     Problem: Falls - Risk of  Goal: *Absence of Falls  Description  Document Raghav Ortiz Fall Risk and appropriate interventions in the flowsheet.   Outcome: Progressing Towards Goal  Note:   Fall Risk Interventions:  Mobility Interventions: Communicate number of staff needed for ambulation/transfer, Patient to call before getting OOB         Medication Interventions: Patient to call before getting OOB, Teach patient to arise slowly                   Problem: Patient Education: Go to Patient Education Activity  Goal: Patient/Family Education  Outcome: Progressing Towards Goal     Problem: Patient Education: Go to Patient Education Activity  Goal: Patient/Family Education  Outcome: Progressing Towards Goal     Problem: TIA/CVA Stroke: 0-24 hours  Goal: Off Pathway (Use only if patient is Off Pathway)  Outcome: Progressing Towards Goal  Goal: Activity/Safety  Outcome: Progressing Towards Goal  Goal: Consults, if ordered  Outcome: Progressing Towards Goal  Goal: Diagnostic Test/Procedures  Outcome: Progressing Towards Goal  Goal: Nutrition/Diet  Outcome: Progressing Towards Goal  Goal: Discharge Planning  Outcome: Progressing Towards Goal  Goal: Medications  Outcome: Progressing Towards Goal  Goal: Respiratory  Outcome: Progressing Towards Goal  Goal: Treatments/Interventions/Procedures  Outcome: Progressing Towards Goal  Goal: Minimize risk of bleeding post-thrombolytic infusion  Outcome: Progressing Towards Goal  Goal: Monitor for complications post-thrombolytic infusion  Outcome: Progressing Towards Goal  Goal: Psychosocial  Outcome: Progressing Towards Goal  Goal: *Hemodynamically stable  Outcome: Progressing Towards Goal  Goal: *Neurologically stable  Description  Absence of additional neurological deficits    Outcome: Progressing Towards Goal  Goal: *Verbalizes anxiety and depression are reduced or absent  Outcome: Progressing Towards Goal  Goal: *Absence of Signs of Aspiration on Current Diet  Outcome: Progressing Towards Goal  Goal: *Absence of deep venous thrombosis signs and symptoms(Stroke Metric)  Outcome: Progressing Towards Goal  Goal: *Ability to perform ADLs and demonstrates progressive mobility and function  Outcome: Progressing Towards Goal  Goal: *Stroke education started(Stroke Metric)  Outcome: Progressing Towards Goal  Goal: *Dysphagia screen performed(Stroke Metric)  Outcome: Progressing Towards Goal  Goal: *Rehab consulted(Stroke Metric)  Outcome: Progressing Towards Goal

## 2019-08-10 NOTE — PROGRESS NOTES
conducted an initial consultation and Spiritual Assessment for Esequiel Robbins, who is a 70 y.o.,female. Patients Primary Language is: [place]. According to the patients EMR Christianity Affiliation is: Other. The reason the Patient came to the hospital is:   Patient Active Problem List    Diagnosis Date Noted    CVA (cerebral vascular accident) ([facility] [address] 08/09/2019    Hip fracture ([facility] 02/22/2017        The  provided the following Interventions:  Initiated a relationship of care and support. Provided information about Spiritual Care Services. Offered prayer and assurance of continued prayers on patient's behalf. The following outcomes were achieved:  Patient expressed gratitude for 's visit. Assessment:  There are no further spiritual or Mandaeism issues which require intervention at this time. Plan:  Chaplains will continue to follow and will provide pastoral care on an as needed/requested basis. Celina Tran M.Div.   , [address] [facility]: [phone]/[facility]: [phone]

## 2019-08-10 NOTE — PROGRESS NOTES
0700  -- Bedside, Verbal and Written shift change report given to [address] (oncoming nurse) by Larry Bacon nurse). Allergy band placed on pt's wrist. Report included the following information SBAR, Kardex, Intake/Output, MAR and Recent Results.        AM medications held due to pt condition.     1130  -- Bedside, Verbal and Written shift change report given to [address]) by CARI (offgoing nurse). Report included the following information SBAR, Kardex, Intake/Output, MAR and Recent Results. Skin assessment completed.

## 2019-08-10 NOTE — PROGRESS NOTES
Problem: Self Care Deficits Care Plan (Adult)  Goal: *Acute Goals and Plan of Care (Insert Text)  Description  Occupational Therapy Goals  Initiated 8/10/2019 within 7 day(s). 1.  Patient will perform rolling in bed with minimal assistance/contact guard assist to assist with pericare  2. Patient will perform static sitting EOB with minimal assistance to prepare for grooming activities. 3.  Patient will perform toilet transfer with moderate assistance . 4. Patient will participate in upper extremity therapeutic exercise/activities with independence for 7 minutes to increase endurance for functional activities. 7.  Patient will utilize energy conservation techniques during functional activities with verbal/tactile cues as needed. Outcome: Progressing Towards Goal   OCCUPATIONAL THERAPY EVALUATION    Patient: Sang Galloway (89 y.o. female)  Date: 8/10/2019  Primary Diagnosis: CVA (cerebral vascular accident) [facility]) [I63.9]        Precautions:  Fall, Skin  PLOF: Per care management note, pt was living with  that has Alzheimer's. Basic needs not consistently met. Pt with significant impairment in regards to self-care. States she has a tub transfer bench and was using a w/c for mobility. ASSESSMENT :  Based on the objective data described below, the patient presents with impaired cognition, memory, strength, and endurance for participation in ADLs and IADLs. Pt supine in bed on arrival with sister present throughout; sister often shaking head in disagreement to various answers provided by pt. Pt requires VC to keep from mouthing cord of call bell. Pt incontinent of bowel and bladder on arrival. Is able to perform bed mobility with moderate assist to roll into sidelying to assist with hygiene. Is able to hold onto bed rails for up to two minutes. Increased difficulty rolling to left vs right. Is unable to assist with clothing management.  Is able to wiggle left fingers, requires increased time to initiate on R. Is able to identify touch on R arm. Pt falling asleep and unable to further assess motor impairments and coordination. Based on performance deficits seen today, pt would benefit from skilled OT to address energy conservation, strength, and endurance for participation in [employer]    Patient will benefit from skilled intervention to address the above impairments. Patient's rehabilitation potential is considered to be Good  Factors which may influence rehabilitation potential include:   ? None noted  ? Mental ability/status  ? Medical condition  ? Home/family situation and support systems  ? Safety awareness  ? Pain tolerance/management  ? Other:      PLAN :  Recommendations and Planned Interventions:   ?               Self Care Training                  ? Therapeutic Activities  ? Functional Mobility Training   ? Cognitive Retraining  ? Therapeutic Exercises           ? Endurance Activities  ? Balance Training                    ? Neuromuscular Re-Education  ? Visual/Perceptual Training     ? Home Safety Training  ? Patient Education                   ? Family Training/Education  ? Other (comment):    Frequency/Duration: Patient will be followed by occupational therapy 3-5 times a week to address goals. Discharge Recommendations: Chris Vera  Further Equipment Recommendations for Discharge: N/A- TBD at next level of care     SUBJECTIVE:   Patient stated i'm here because my knee and ankle shifted.     OBJECTIVE DATA SUMMARY:     Past Medical History:   Diagnosis Date    Rheumatoid arthritis, adult ([facility] [address]     Tachycardia      Past Surgical History:   Procedure Laterality Date    HX CAROTID ENDARTERECTOMY      left    HX CATARACT REMOVAL      bilateral    HX HIP REPLACEMENT      left     Barriers to Learning/Limitations: yes;  cognitive and altered mental status (i.e.Sedation, Confusion)  Compensate with: visual, verbal, tactile, kinesthetic cues/model    Home Situation:   Home Situation  Home Environment: Private residence  # Steps to Enter: 0  One/Two Story Residence: Two story  Living Alone: No  Support Systems: Family member(s)  Patient Expects to be Discharged to[de-identified] Private residence  Current DME Used/Available at Home: Wheelchair, tub transfer bench  ? Right hand dominant   ? Left hand dominant    Cognitive/Behavioral Status:  Neurologic State: Alert;Confused;Drowsy  Orientation Level: Oriented to person;Oriented to place; Disoriented to situation  Cognition: Follows commands;Decreased attention/concentration(Increased time to initiate)  Safety/Judgement: Decreased insight into deficits    Skin: bruising, with no visible wounds  Edema: none noted    Vision/Perceptual:     Corrective Lenses: Glasses- wears bifocals    Coordination: BUE  Unable to fully assess secondary to pt fatigue and decreased direction following    Balance:  Sitting: Impaired; With support  Sitting - Static: Fair (occasional)(Minus)  Sitting - Dynamic: Poor (constant support)(/Poor +)  Standing: (N/A, does not stand at baseline)    Strength: BUE  Strength: Generally decreased, functional(grossly 3-/5 both LE)  Unable to formally assess secondary to pt fatigue; generally decreased muscle mass    Tone & Sensation: BUE  Tone: Normal      Range of Motion: BUE  Unable to formally assess today secondary to pt fatigue and direction following    Functional Mobility and Transfers for ADLs:  Bed Mobility:  Rolling: Moderate assistance  Supine to Sit: Moderate assistance;Maximum assistance  Sit to Supine: Moderate assistance  Scooting: Maximum assistance; Total assistance  Transfers:  Sit to Stand: (N/A, does not stand at baseline)    ADL Assessment:   Feeding: Moderate assistance  Oral Facial Hygiene/Grooming: Moderate assistance  Bathing:  Total assistance  Upper Body Dressing: Maximum assistance  Lower Body Dressing: Maximum assistance  Toileting: Total assistance(incontinent of bowel and bladder)    ADL Intervention:  Upper Body [address]: Total assistance (dependent)    Toileting  Bladder Hygiene: Total assistance (dependent)  Bowel Hygiene: Total assistance (dependent)  Clothing Management: Total assistance (dependent)    Pt incontinent of bowel and bladder on arrival; does not identify that she is soiled but states she can tell when she needs to go. Pt assist with hygiene by rolling in bed with MOD A; increased time to initiate rolling to L. Requires gown change and is unable to assist.     Cognitive Retraining  Safety/Judgement: Decreased insight into deficits      Pain:  Pain level pre-treatment: 0/10   Pain level post-treatment: 0/10   Pain Intervention(s): Medication (see MAR); Rest, Ice, Repositioning  Response to intervention: Nurse notified, See doc flow    Activity Tolerance:   Fair    Please refer to the flowsheet for vital signs taken during this treatment. After treatment:   ? Patient left in no apparent distress sitting up in chair  ? Patient left in no apparent distress in bed  ? Call bell left within reach  ? Nursing notified  ? Caregiver present  ? Bed alarm activated    COMMUNICATION/EDUCATION:   ? Role of Occupational Therapy in the acute care setting  ? Home safety education was provided and the patient/caregiver indicated understanding. ? Patient/family have participated as able in goal setting and plan of care. ? Patient/family agree to work toward stated goals and plan of care. ? Patient understands intent and goals of therapy, but is neutral about his/her participation. ? Patient is unable to participate in goal setting and plan of care.     Thank you for this referral.  Espinoza Marie OT  Time Calculation: 53 mins    Eval Complexity: History: MEDIUM Complexity : Expanded review of history including physical, cognitive and psychosocial  history ; Examination: MEDIUM Complexity : 3-5 performance deficits relating to physical, cognitive , or psychosocial skils that result in activity limitations and / or participation restrictions; Decision Making:MEDIUM Complexity : Patient may present with comorbidities that affect occupational performnce.  Miniml to moderate modification of tasks or assistance (eg, physical or verbal ) with assesment(s) is necessary to enable patient to complete evaluation

## 2019-08-10 NOTE — PROGRESS NOTES
Problem: Mobility Impaired (Adult and Pediatric)  Goal: *Acute Goals and Plan of Care (Insert Text)  Description  Physical Therapy Goals  Initiated 8/10/2019 and to be accomplished within 7 day(s)  1. Patient will move from supine to sit and sit to supine , scoot up and down and roll side to side in bed with minimal assistance/contact guard assist.     2.  Patient will transfer from bed to wheelchair and wheelchair to bed with moderate assistance using a transfer board. 3.  Patient/family will demonstrate independence with performance of home exercise program.  4.  Patient will demonstrate ability to self-propel in wheelchair x 50 feet with min Assistance   Outcome: Progressing Towards Goal     PHYSICAL THERAPY EVALUATION    Patient: George Francisco (80 y.o. female)  Date: 8/10/2019  Primary Diagnosis: CVA (cerebral vascular accident) [facility]) [I63.9]        Precautions:      PLOF: Per daughter, patient non-ambulatory, was receiving PT 2x/week at home. ASSESSMENT :  Based on the objective data described below, the patient presents with some confusion, generalized weakness, decreased range of motion, decreased balance, decreased bed mobility and transfer independence. Will benefit from skilled PT intervention to increase overall functional mobility independence and safety. Patient will benefit from skilled intervention to address the above impairments. Patient's rehabilitation potential is considered to be Fair  Factors which may influence rehabilitation potential include:  ? None noted  ? Mental ability/status  ? Medical condition  ? Home/family situation and support systems  ? Safety awareness  ? Pain tolerance/management  ?          Other:     Recommendations for nursing:  Written on communication board: sit edge of bed with assist of 1  Verbally communicated to: nurse Sanchez     PLAN :  Recommendations and Planned Interventions:   ?           Bed Mobility Training             ? Neuromuscular Re-Education  ? Transfer Training                   ? Orthotic/Prosthetic Training  ? Gait Training                          ? Modalities  ? Therapeutic Exercises           ? Edema Management/Control  ? Therapeutic Activities            ? Family Training/Education  ? Patient Education  ? Other (comment): Plan of care, safety, bed mobility. Frequency/Duration: Patient will be followed by physical therapy 1 time per day/3-5 days per week to address goals. Discharge Recommendations: Skilled Nursing Facility  Further Equipment Recommendations for Discharge: to be determined at the next level of care      SUBJECTIVE:   Patient stated I can walk with a walker.     OBJECTIVE DATA SUMMARY:     Past Medical History:   Diagnosis Date    Rheumatoid arthritis, adult ([facility] [address]     Tachycardia      Past Surgical History:   Procedure Laterality Date    HX CAROTID ENDARTERECTOMY      left    HX CATARACT REMOVAL      bilateral    HX HIP REPLACEMENT      left     Barriers to Learning/Limitations: yes;  cognitive, physical and altered mental status (i.e.Sedation, Confusion)  Compensate with: Verbal Cues and Tactile Cues  Home Situation:  Home Situation  Home Environment: Private residence  # Steps to Enter: 0  One/Two Story Residence: Two story  Living Alone: No  Support Systems: Family member(s)  Patient Expects to be Discharged to[de-identified] Private residence  Current DME Used/Available at Home: Wheelchair  Critical Behavior:  Neurologic State: Alert;Confused  Orientation Level: Disoriented to place; Disoriented to situation;Disoriented to time  Cognition: Follows commands  Safety/Judgement: Decreased insight into deficits  Psychosocial  Patient Behaviors: Calm; Cooperative  Purposeful Interaction: Yes  Pt Identified Daily Priority: Clinical issues (comment)  Caritas Process: Establish trust;Attend basic human needs;Nurture loving kindness  Caring Interventions: Therapeutic modalities; Reassure  Reassure: Caring rounds; Therapeutic listening  Therapeutic Modalities: Intentional therapeutic touch;Humor  Skin Condition/Temp: Warm;Dry;Flaky  Skin Integrity: Tattoos (comment)  Skin Integumentary  Skin Color: Appropriate for ethnicity  Skin Condition/Temp: Warm;Dry;Flaky  Skin Integrity: Tattoos (comment)     Strength:    Strength: Generally decreased, functional(grossly 3-/5 both LE)      Tone & Sensation:   Tone: Normal        Range Of Motion:  AROM: Generally decreased, functional(except for knee extension, + knee flexion contracture)  PROM: Generally decreased, functional(except for knee extension, + knee flexion contracture)      Functional Mobility:  Bed Mobility:  Supine to Sit: Moderate assistance;Maximum assistance  Sit to Supine: Moderate assistance  Scooting: Maximum assistance; Total assistance  Transfers:  Sit to Stand: (N/A, does not stand at baseline)  Balance:   Sitting: Impaired; With support  Sitting - Static: Fair (occasional)(Minus)  Sitting - Dynamic: Poor (constant support)(/Poor +)  Standing: (N/A, does not stand at baseline)  Ambulation/Gait Training:  Gait Description (WDL): (N/A - patient non-ambulatory)  Pain:  Pain level pre-treatment: 0/10   Pain level post-treatment: 0/10     Activity Tolerance:   Fair Minus    Please refer to the flowsheet for vital signs taken during this treatment. After treatment:   ?         Patient left in no apparent distress sitting up in chair  ? Patient left in no apparent distress in bed  ? Call bell left within reach  ? Nursing notified  ? Caregiver present  ? Bed alarm activated  ? SCDs applied    COMMUNICATION/EDUCATION:   ?         Role of Physical Therapy in the acute care setting. ?         Fall prevention education was provided and the patient/caregiver indicated understanding. ?          Patient/family have participated as able in goal setting and plan of care. ?         Patient/family agree to work toward stated goals and plan of care. ?         Patient understands intent and goals of therapy, but is neutral about his/her participation. ? Patient is unable to participate in goal setting/plan of care: ongoing with therapy staff. ?         Other:     Thank you for this referral.  Tomas Blanchard, PT   Time Calculation: 24 mins      Eval Complexity: History: MEDIUM  Complexity : 1-2 comorbidities / personal factors will impact the outcome/ POC Exam:MEDIUM Complexity : 3 Standardized tests and measures addressing body structure, function, activity limitation and / or participation in recreation  Presentation: MEDIUM Complexity : Evolving with changing characteristics  Clinical Decision Making:Medium Complexity    Overall Complexity:MEDIUM

## 2019-08-10 NOTE — ED NOTES
Noted pt /w two consecutive Blood pressure with MAP <65. Dr. Jared Roberts. Per Dr. Fran Jeffries will begin bolus fluids and maintenance fluids after bolus.

## 2019-08-10 NOTE — PROGRESS NOTES
1130-- Assumed care for patient. Received report from [facility]. Will conitue to monitor. Plan of care discussed, NIH included. 1430-- Pt given meds per MAR. NIH completed. Family at bedside. 1600--Pt assessed. OT in to see patient. Family at bedside. 1800--Pt resting with eyes closed. Meds given per [facility].    1900--Bedside report given to Osmar Palma RN. SBAR, Kardex, MAR, and plan of care discussed. Pt unable to complete NIH at shift change. Pt stated, \"I don't feel like it, leave me alone\". Pt uncooperative at this time. Speech clear, opened eyes, repsponded to name. No neuro changes noted at this time.

## 2019-08-10 NOTE — PROGRESS NOTES
Pharmacy Dosing Services: Vancomycin    Indication: Sepsis of Unknown Etiology    Day of therapy: 0    Other Antimicrobials (Include dose, start day & day of therapy):  Levofloxacin 750 mg every 48 hours  Piperacillin-Tazobactam 3.375 grams every 8 hours    Loading dose (date given): 1,000 mg  Current Maintenance dose: New start     Goal Vancomycin Level: 15-20 mcg/mL  (Trough 15-20 for most infections, 20 for meningitis/osteomyelitis, pre-HD level ~25)    Vancomycin Level (if drawn): New start      Significant Cultures: New start     Renal function stable? (unstable defined as SCr increase of 0.5 mg/dL or > 50% increase from baseline, whichever is greater) (Y/N): Y     CAPD, Hemodialysis or Renal Replacement Therapy (Y/N): N     Recent Labs     19  1848   CREA 1.10   BUN 23*   WBC 12.1     Temp (24hrs), Av.9 °F (36.6 °C), Min:97.6 °F (36.4 °C), Max:98.3 °F (36.8 °C)    Creatinine Clearance (Creatinine Clearance (ml/min)): Estimated Creatinine Clearance: 27.4 mL/min (based on SCr of 1.1 mg/dL). Regimen assessment: New start   Maintenance dose: 1,000 mg every 36 hours  Next scheduled level:  at [address] [place] [address] St will follow daily and adjust medications as appropriate for renal function and/or serum levels.     Thank you,  Alyce Batista, PHARMD

## 2019-08-10 NOTE — PROGRESS NOTES
Problem: Dysphagia (Adult)  Goal: *Acute Goals and Plan of Care (Insert Text)  Description  Patient will:  1. Tolerate PO trials with 0 s/s overt distress in 4/5 trials  2. Utilize compensatory swallow strategies/maneuvers (decrease bite/sip, size/rate, alt. liq/sol) with min cues in 4/5 trials    Recommend:   Regular diet with thin liquids  Meds as tolerated  Aspiration precautions  HOB >45 degrees during all intake and for at least 30 min after po   Small bites/sips, Slow rate of intake      Outcome: Progressing Towards Goal     SPEECH LANGUAGE PATHOLOGY BEDSIDE SWALLOW EVALUATION/TREATMENT    Patient: Alexander Obando (84 y.o. female)  Date: 8/10/2019  Primary Diagnosis: CVA (cerebral vascular accident) ([facility] [address] [I63.9]  Precautions: Aspiration     PLOF: Pt reports regular diet with thin liquids     ASSESSMENT :  Based on the objective data described below, the patient presents with min oropharyngeal dysphagia c/b x1 delayed cough and increased oral prep phase. Pt alert but confused, able to follow one step commands. Oral mech exam revealed mild R labial droop. Motor speech appears functional as pt with 100% speech intelligibility. Pt demo timely swallow initiation and adequate laryngeal elevation to palpation. X1 delayed cough noted following thin liquid trial + straw, but able to tolerate 2/10 trials with no overt s/sx aspiration. Pt demo mildly increased oral prep phase with regular solids; however, able to clear with 100% oral clearance. Recommend continue current diet with use of the above mentioned compensatory strategies. Discussed with pt and nursing. TREATMENT :  Skilled therapy initiated; Educated pt on aspiration precautions and importance of compensatory swallow techniques to decrease aspiration risk (decrease rate of intake & sip/bite size, upright @HOB for all po intake and ~30 minutes after po); verbalized comprehension; however, question carryover.   Will follow x1-2 visits to ensure continued tolerance of po and for completion of training and education. Patient will benefit from skilled intervention to address the above impairments. Patient's rehabilitation potential is considered to be Good  Factors which may influence rehabilitation potential include:   ? None noted  ? Mental ability/status  ? Medical condition  ? Home/family situation and support systems  ? Safety awareness  ? Pain tolerance/management  ? Other:      PLAN :  Recommendations and Planned Interventions:  As above   Frequency/Duration: Patient will be followed by speech-language pathology x1-2 visits to address goals. Discharge Recommendations: To Be Determined     SUBJECTIVE:   Patient stated I am always thirsty. OBJECTIVE:     Past Medical History:   Diagnosis Date    Rheumatoid arthritis, adult ([facility] [address]     Tachycardia      Past Surgical History:   Procedure Laterality Date    HX CAROTID ENDARTERECTOMY      left    HX CATARACT REMOVAL      bilateral    HX HIP REPLACEMENT      left     Prior Level of Function/Home Situation:   Home Situation  Home Environment: Private residence  # Steps to Enter: 0  One/Two Story Residence: Two story  Living Alone: No  Support Systems: Family member(s)  Patient Expects to be Discharged to[de-identified] Private residence  Current DME Used/Available at Home: None  Diet prior to admission: Regular diet with thin liquids   Current Diet:  Regular diet with thin liquids    Cognitive and Communication Status:  Neurologic State: Alert, Confused  Orientation Level: Oriented to person, Oriented to place, Disoriented to time, Disoriented to situation  Cognition: Follows commands  Oral Assessment:  Oral Assessment  Labial: Right droop  Dentition: Natural;Intact  Oral Hygiene: Good  Lingual: No impairment  Velum: No impairment  Mandible: No impairment  P.O.  Trials:  Patient Position: [facility] at [facility]  Vocal quality prior to P.O.: Hoarse  Consistency Presented: Thin liquid; Solid  How Presented: Self-fed/presented;Cup/sip;Straw;Successive swallows;Spoon  Bolus Acceptance: No impairment  Bolus Formation/Control:  Delayed   Oral Residue: None  Initiation of Swallow: No impairment  Laryngeal Elevation: Functional  Aspiration Signs/Symptoms: Delayed cough/throat clear(x1)  Pharyngeal Phase Characteristics: No impairment, issues, or problems   Effective Modifications: Small sips and bites  Oral Phase Severity: Min  Pharyngeal Phase Severity: Min     PAIN:  Start of Eval: 0  End of Eval: 0     After treatment:   ?            Patient left in no apparent distress sitting up in chair  ? Patient left in no apparent distress in bed  ? Call bell left within reach  ? Nursing notified  ? Family present  ? Caregiver present  ? Bed alarm activated    COMMUNICATION/EDUCATION:   ?            Aspiration precautions; swallow safety; compensatory techniques. ?            Patient/family have participated as able in goal setting and plan of care. ?            Patient/family agree to work toward stated goals and plan of care. ?            Patient understands intent and goals of therapy; neutral about participation. ? Patient unable to participate in goal setting/plan of care; educ ongoing with interdisciplinary staff  ? Posted safety precautions in patient's room.     Thank you for this referral,  SHELL VelasquezS., [id number] [facility]  Speech-Language Pathologist
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
## 2019-08-10 NOTE — PROGRESS NOTES
0041: received pt via stretcher accompanied by ED nurse and pt daughter, dual NIH with Tristan RN    3900: pt resting in bed, alert and oriented to self and place, disoriented to situation and time, denies pain or discomfort, oriented to room and unit routine,  Dual skin assessment done,  Bed alarm on, bed locked and low position, call bell within reach    0207: Zosyn 3.375 g IV administered     0250: NIH scale done, pt scored 5, continue to monitor    0440: NIH done , pt score 6    0500: reassessment done, no changes, call bell within reach    0648: NIH completed, incontinence care done    Bedside shift change report given to Christiano Gasca RN (oncoming nurse) by Hero Alegria RN (offgoing nurse). Report included the following information SBAR, Kardex, ED Summary, Intake/Output, MAR, Recent Results and Med Rec Status.

## 2019-08-10 NOTE — CONSULTS
Teleneurology Consult    Patient ID  Name:  Rosemarie Anaya  :  1940  MRN:  [id number]  Age:  70 y.o. PCP:  Sanjeev Griffin MD    Subjective:     Encounter Date:  2019    Referring Physician: Dr. Marleny Randall    Chief Complaint   Patient presents with    Dental Pain    Lethargy       History of Present Illness:   Rosemarie Anaya is a 70 y.o. RH female, pmhx of RA and prior left hip/femur fracture as well as left CEA, reportedly lives at home with spouse but dependent on all ADL's and generally non-ambulatory, brought in by daughter for confusion and new onset right facial weakness and possibly slurred speech vs. Dysarthria.  Patient herself can not recall why she is in hospital.     Current Facility-Administered Medications   Medication Dose Route Frequency Provider Last Rate Last Dose    enoxaparin (LOVENOX) injection 30 mg  30 mg SubCUTAneous DAILY Milton Wray MD        [START ON 2019] vancomycin (VANCOCIN) 1,000 mg in 0.9% sodium chloride (MBP/ADV) 250 mL adv  1,000 mg IntraVENous Q36H Kim Brown NP        [START ON 2019] VANCOMYCIN INFORMATION NOTE   Other ONCE José Narayan NP        VANCOMYCIN INFORMATION NOTE   Other Rx Dosing/Monitoring José Narayan NP        piperacillin-tazobactam (ZOSYN) 3.375 g in 0.9% sodium chloride (MBP/ADV) 100 mL MBP  3.375 g IntraVENous Q8H José Narayan NP 25 mL/hr at 08/10/19 0759 3.375 g at 08/10/19 0759    sodium chloride (NS) flush 5-40 mL  5-40 mL IntraVENous Q8H Milton Wray MD        sodium chloride (NS) flush 5-40 mL  5-40 mL IntraVENous PRN Milton Wray MD        sodium chloride (NS) flush 5-10 mL  5-10 mL IntraVENous PRN Milton Wray MD        [START ON 2019] levoFLOXacin (LEVAQUIN) 750 mg in D5W IVPB  750 mg IntraVENous Q48H Milton Wray MD         Allergies   Allergen Reactions    Aspirin Unknown (comments)    Codeine Unknown (comments)     Patient Active Problem List   Diagnosis Code    Hip fracture ([facility] S72.009A    CVA (cerebral vascular accident) ([facility] I63.9     Past Medical History:   Diagnosis Date    [facility] adult ([facility]     Tachycardia       Past Surgical History:   Procedure Laterality Date    HX CAROTID ENDARTERECTOMY      left    HX CATARACT REMOVAL      bilateral    HX HIP REPLACEMENT      left      History reviewed. No pertinent family history. Social History     Socioeconomic History    Marital status:      Spouse name: Not on file    Number of children: Not on file    Years of education: Not on file    Highest education level: Not on file   Tobacco Use    Smoking status: Current Every Day Smoker     Packs/day: 1.50    Smokeless tobacco: Never Used   Substance and Sexual Activity    Alcohol use: No    Drug use: No       Review of Systems:  Pertinent items are noted in HPI. Objective:     Vitals:    08/10/19 0301 08/10/19 0453 08/10/19 0658 08/10/19 0743   BP: 125/51 102/58 106/57 113/59   Pulse: 82 71 69 72   Resp: 22 22 22 18   Temp: 97.8 °F (36.6 °C) 97.5 °F (36.4 °C) 97.6 °F (36.4 °C) 98 °F (36.7 °C)   SpO2: 92% 93% 92% 93%   Weight:       Height:           Physical Exam:    General:  Patient seen via telemedicine video encounter utilizing a tele-presenter. No acute distress. Neck: Supple, nontender, thyroid within normal limits, no JVD, no bruits, no pain with resistance to active range of motion per telepresenter  Heart: Regular rate and rhythm, no murmurs, rub, or gallop. Normal S1S2 per telepresenter  Lungs:  Clear to auscultation bilaterally with equal chest expansion, no cough, no wheeze per telepresenter  Musculoskeletal: No obvious impairment on gross testing    NEUROLOGICAL EXAMINATION:     Mental Status:   Alert and oriented to person, place, and month/year but can not recall name of president,  with recent and remote memory impaired.   Attention span and concentration are normal.   Speech is fluent with impaired  fund of knowledge. Cranial Nerves:    II, III, IV, VI:  Visual fields are normal to confrontation. Pupils are equal, round, and reactive to light and accommodation but slight mistakes on right VF  Extra-ocular movements are full and fluid. No ptosis or nystagmus. V-XII:   Face is asymmetric with right NLF  Normal sensation. The palate rises symmetrically and the tongue protrudes midline. Sternocleidomastoids 5/5.       Motor Examination: Strength 5/5 on LUE but RUE 4/5 with full , lower extremities flexed and minimal effort to move bilaterally  No involuntary movements, Normal tone    Sensory exam:  Normal throughout to light touch per telepresenter but suggestion of right sided extinction  Coordination:  No dysmetria, No resting or intention tremor    Gait and Station:  Not tested  Reflexes:  Not tested by telepresenter but no babinski   Labs  Recent Results (from the past 24 hour(s))   EKG, 12 LEAD, INITIAL    Collection Time: 08/09/19  6:28 PM   Result Value Ref Range    Ventricular Rate 94 BPM    Atrial Rate 94 BPM    P-R Interval 146 ms    QRS Duration 124 ms    Q-T Interval 376 ms    QTC Calculation (Bezet) 470 ms    Calculated P Axis 80 degrees    Calculated R Axis 20 degrees    Calculated T Axis 120 degrees    Diagnosis       Normal sinus rhythm  Biatrial enlargement  Left bundle branch block  Abnormal ECG  When compared with ECG of 21-FEB-2017 19:53,  Left bundle branch block is now present  Criteria for Anterior infarct are no longer present     POC LACTIC ACID    Collection Time: 08/09/19  6:47 PM   Result Value Ref Range    Lactic Acid (POC) 1.81 0.40 - 2.00 mmol/L   CULTURE, BLOOD    Collection Time: 08/09/19  6:48 PM   Result Value Ref Range    Special Requests: PERIPHERAL      Culture result: NO GROWTH AFTER 13 HOURS     METABOLIC PANEL, COMPREHENSIVE    Collection Time: 08/09/19  6:48 PM   Result Value Ref Range    Sodium 145 136 - 145 mmol/L    Potassium 3.2 (L) 3.5 - 5.5 mmol/L    Chloride 105 100 - 111 mmol/L    CO2 33 (H) 21 - 32 mmol/L    Anion gap 7 3.0 - 18 mmol/L    Glucose 108 (H) 74 - 99 mg/dL    BUN 23 (H) 7.0 - 18 MG/DL    Creatinine 1.10 0.6 - 1.3 MG/DL    BUN/Creatinine ratio 21 (H) 12 - 20      GFR est AA 59 (L) >60 ml/min/1.73m2    GFR est non-AA 48 (L) >60 ml/min/1.73m2    Calcium 14.8 (HH) 8.5 - 10.1 MG/DL    Bilirubin, total 0.5 0.2 - 1.0 MG/DL    ALT (SGPT) 11 (L) 13 - 56 U/L    AST (SGOT) 25 10 - 38 U/L    Alk. phosphatase 114 45 - 117 U/L    Protein, total 6.9 6.4 - 8.2 g/dL    Albumin 3.3 (L) 3.4 - 5.0 g/dL    Globulin 3.6 2.0 - 4.0 g/dL    A-G Ratio 0.9 0.8 - 1.7     CBC WITH AUTOMATED DIFF    Collection Time: 08/09/19  6:48 PM   Result Value Ref Range    WBC 12.1 4.6 - 13.2 K/uL    RBC 4.43 4.20 - 5.30 M/uL    HGB 14.1 12.0 - 16.0 g/dL    HCT 42.5 35.0 - 45.0 %    MCV 95.9 74.0 - 97.0 FL    MCH 31.8 24.0 - 34.0 PG    MCHC 33.2 31.0 - 37.0 g/dL    RDW 13.0 11.6 - 14.5 %    PLATELET 479 352 - 674 K/uL    MPV 11.7 9.2 - 11.8 FL    NEUTROPHILS 81 (H) 40 - 73 %    LYMPHOCYTES 11 (L) 21 - 52 %    MONOCYTES 7 3 - 10 %    EOSINOPHILS 1 0 - 5 %    BASOPHILS 0 0 - 2 %    ABS. NEUTROPHILS 9.8 (H) 1.8 - 8.0 K/UL    ABS. LYMPHOCYTES 1.4 0.9 - 3.6 K/UL    ABS. MONOCYTES 0.8 0.05 - 1.2 K/UL    ABS. EOSINOPHILS 0.1 0.0 - 0.4 K/UL    ABS.  BASOPHILS 0.0 0.0 - 0.1 K/UL    DF AUTOMATED     CARDIAC PANEL,(CK, CKMB & TROPONIN)    Collection Time: 08/09/19  6:48 PM   Result Value Ref Range    CK 42 26 - 192 U/L    CK - MB 1.8 <3.6 ng/ml    CK-MB Index 4.3 (H) 0.0 - 4.0 %    Troponin-I, QT 0.06 (H) 0.0 - 0.045 NG/ML   CULTURE, BLOOD    Collection Time: 08/09/19  6:58 PM   Result Value Ref Range    Special Requests: PERIPHERAL      Culture result: NO GROWTH AFTER 13 HOURS     URINALYSIS W/ RFLX MICROSCOPIC    Collection Time: 08/09/19  8:15 PM   Result Value Ref Range    Color YELLOW      Appearance TURBID      Specific gravity 1.014 1.005 - 1.030      pH (UA) 6.5 5.0 - 8.0      Protein TRACE (A) NEG mg/dL    Glucose NEGATIVE  NEG mg/dL    Ketone NEGATIVE  NEG mg/dL    Bilirubin NEGATIVE  NEG      Blood LARGE (A) NEG      Urobilinogen 1.0 0.2 - 1.0 EU/dL    Nitrites POSITIVE (A) NEG      Leukocyte Esterase LARGE (A) NEG     URINE MICROSCOPIC ONLY    Collection Time: 08/09/19  8:15 PM   Result Value Ref Range    WBC 21 to 35 0 - 4 /hpf    RBC 4 to 10 0 - 5 /hpf    Epithelial cells FEW 0 - 5 /lpf    Bacteria 2+ (A) NEG /hpf    Amorphous Crystals 3+ (A) NEG    CA Oxalate crystals FEW (A) NEG     TROPONIN I    Collection Time: 08/09/19  9:30 PM   Result Value Ref Range    Troponin-I, QT 0.09 (H) 0.0 - 0.045 NG/ML   CBC WITH AUTOMATED DIFF    Collection Time: 08/10/19  5:02 AM   Result Value Ref Range    WBC 7.9 4.6 - 13.2 K/uL    RBC 3.46 (L) 4.20 - 5.30 M/uL    HGB 10.7 (L) 12.0 - 16.0 g/dL    HCT 34.0 (L) 35.0 - 45.0 %    MCV 98.3 (H) 74.0 - 97.0 FL    MCH 30.9 24.0 - 34.0 PG    MCHC 31.5 31.0 - 37.0 g/dL    RDW 13.4 11.6 - 14.5 %    PLATELET 346 (L) 562 - 420 K/uL    MPV 11.4 9.2 - 11.8 FL    NEUTROPHILS 79 (H) 40 - 73 %    LYMPHOCYTES 13 (L) 21 - 52 %    MONOCYTES 7 3 - 10 %    EOSINOPHILS 1 0 - 5 %    BASOPHILS 0 0 - 2 %    ABS. NEUTROPHILS 6.2 1.8 - 8.0 K/UL    ABS. LYMPHOCYTES 1.0 0.9 - 3.6 K/UL    ABS. MONOCYTES 0.6 0.05 - 1.2 K/UL    ABS. EOSINOPHILS 0.1 0.0 - 0.4 K/UL    ABS.  BASOPHILS 0.0 0.0 - 0.1 K/UL    DF AUTOMATED     METABOLIC PANEL, BASIC    Collection Time: 08/10/19  5:02 AM   Result Value Ref Range    Sodium 147 (H) 136 - 145 mmol/L    Potassium 3.4 (L) 3.5 - 5.5 mmol/L    Chloride 114 (H) 100 - 111 mmol/L    CO2 27 21 - 32 mmol/L    Anion gap 6 3.0 - 18 mmol/L    Glucose 107 (H) 74 - 99 mg/dL    BUN 18 7.0 - 18 MG/DL    Creatinine 0.95 0.6 - 1.3 MG/DL    BUN/Creatinine ratio 19 12 - 20      GFR est AA >60 >60 ml/min/1.73m2    GFR est non-AA 57 (L) >60 ml/min/1.73m2    Calcium 12.1 (H) 8.5 - 10.1 MG/DL        Relevant Radiology:   Ct Head Wo Cont    Result Date: 8/10/2019  EXAM: CT head  CLINICAL INDICATION/HISTORY: Left-sided facial droop    --Additional history: None. COMPARISON: 05/16/2013 TECHNIQUE: Axial CT imaging of the head was performed without intravenous contrast. One or more dose reduction techniques were used on this CT: automated exposure control, adjustment of the mAs and/or kVp according to patient size, and iterative reconstruction techniques. The specific techniques used on this CT exam have been documented in the patient's electronic medical record. Digital Imaging and Communications in Medicine (DICOM) format image data are available to nonaffiliated external healthcare facilities or entities on a secure, media free, reciprocally searchable basis with patient authorization for at least a 12 month period after this study. _______________ FINDINGS: CALVARIUM: Intact. SOFT TISSUES/SCALP: Normal. SINUSES: Clear. BRAIN AND POSTERIOR FOSSA: There is proportional enlargement of the ventricles and cerebral spinal fluid spaces consistent with generalized cerebral volume loss. There is no intracranial hemorrhage, mass effect, or midline shift. There are scattered periventricular and subcortical white matter hypodensities present consistent with nonspecific gliosis. This is most commonly associated with sequelae of chronic microvascular ischemia. Stable focal hypodensity within the right caudate head representing lacunar infarct. EXTRA-AXIAL SPACES AND MENINGES: There are no abnormal extra-axial fluid collections. OTHER: There are atherosclerotic calcifications of the carotid siphons and intradural vertebral arteries. _______________     IMPRESSION: 1. No acute intracranial abnormalities. Specifically, no hemorrhage, mass effect or CT evident acute cortical infarction. 2. Generalized cerebral volume loss with sequelae of chronic microvascular ischemia. Preliminary report provided by the on-call radiology resident.         Impression:   CVA of ?sub-acute duration with residual right facial weakness and suspected right neglect/extinction   Plan:     MRI/MRA of head and neck  Echo  ASA/Statin  PT        Signed By:  Travis Lee DO     8/10/2019    [facility]

## 2019-08-10 NOTE — H&P
History and Physical    Patient: Sariah Regan               Sex: female          DOA: 8/9/2019       YOB: 1940      Age:  70 y.o.        LOS:  LOS: 1 day        HPI:     Sariah Regan is a 70 y.o. female who presented to the ER with right sided facial droop. She had reported not been feeling well for roughly two weeks prior to admission. She had been weak and lethargic. Her daughter found her confused and with facial droop. It had been two days since she had been seen without facial droop. She did not appear to have muscle weakness. She presented to the ER where she was evaluated by TeleNeurology. She will be admitted for ongoing management. She is not in the tPA window. Past Medical History:   Diagnosis Date    Rheumatoid arthritis, adult ([facility]     Tachycardia        Social History:   Tobacco use:  Patient does not smoke   Alcohol use:  Patient does not use alcohol   Patient lives alone but daughter is near by    Family History:   Multiple family members with HTN    Review of Systems    Constitutional:  No fever or weight loss  HEENT:  No headache or visual changes  Cardiovascular:  No chest pain or diaphoresis  Respiratory:  No coughing, wheezing, or shortness of breath. GI:  No nausea or vomitting. No diarrhea  :  No hematuria or dysuria  Skin:  No rashes or moles  Neuro:  Right sided facial droop without muscle weakness, no seizures or syncope  Hematological:  No bruising or bleeding  Endocrine:  No diabetes or thyroid disease    Physical Exam:      Visit Vitals  /63 (BP 1 Location: Right arm, BP Patient Position: At rest)   Pulse 73   Temp 98.3 °F (36.8 °C)   Resp 24   Ht 4' 10\" (1.473 m)   Wt 39.9 kg (88 lb)   SpO2 94%   BMI 18.39 kg/m²       Physical Exam:    Gen:  No distress, alert  HEENT:  Normal cephalic atraumatic, extra-occular movements are intact.   Right facial droop is improved  Neck:  Supple, No JVD  Lungs:  Clear bilaterally, no wheeze, no rales, normal effort  Heart:  Regular Rate and Rhythm, normal S1 and S2, no edema  Abdomen:  Soft, non tender, normal bowel sounds, no guarding. Extremities:  Well perfused, no cyanosis or edema  Neurological:  Awake and alert, CN's are intact, normal strength throughout extremities  Skin:  No rashes or moles  Mental Status:  Normal thought process, does not appear anxious    Laboratory Studies:    BMP:   Lab Results   Component Value Date/Time     (H) 08/10/2019 05:02 AM    K 3.4 (L) 08/10/2019 05:02 AM     (H) 08/10/2019 05:02 AM    CO2 27 08/10/2019 05:02 AM    AGAP 6 08/10/2019 05:02 AM     (H) 08/10/2019 05:02 AM    BUN 18 08/10/2019 05:02 AM    CREA 0.95 08/10/2019 05:02 AM    GFRAA >60 08/10/2019 05:02 AM    GFRNA 57 (L) 08/10/2019 05:02 AM     CBC:   Lab Results   Component Value Date/Time    WBC 7.9 08/10/2019 05:02 AM    HGB 10.7 (L) 08/10/2019 05:02 AM    HCT 34.0 (L) 08/10/2019 05:02 AM     (L) 08/10/2019 05:02 AM       Assessment/Plan     Principal Problem:    CVA (cerebral vascular accident) ([facility] (8/9/2019)        PLAN:    Proceed with MRA of head and neck  Echocardiogram  ASA  Neurology consult  PT eval   Mobilize  DVT prophylaxis.

## 2019-08-10 NOTE — ED NOTES
TRANSFER - ED to INPATIENT REPORT:    SBAR report made available to receiving floor on this patient being transferred to [address] [place] [address] ([facility])  for routine progression of care       Admitting diagnosis CVA (cerebral vascular accident) [facility]) [I63.9]    Information from the following report(s) SBAR was made available to receiving floor. Lines:   Peripheral IV 08/09/19 Left Antecubital (Active)   Site Assessment Clean, dry, & intact 8/9/2019  6:40 PM   Phlebitis Assessment 0 8/9/2019  6:40 PM   Infiltration Assessment 0 8/9/2019  6:40 PM   Dressing Status Clean, dry, & intact 8/9/2019  6:40 PM   Dressing Type Transparent 8/9/2019  6:40 PM       Peripheral IV 08/09/19 Right Antecubital (Active)   Site Assessment Clean, dry, & intact 8/9/2019  9:17 PM   Phlebitis Assessment 0 8/9/2019  9:17 PM   Infiltration Assessment 0 8/9/2019  9:17 PM   Dressing Status Clean, dry, & intact 8/9/2019  9:17 PM        Medication list confirmed with patient    Opportunity for questions and clarification was provided.       Patient is disoriented Last NIH 6  Patient is  incontinent and non-ambulatory     Valuables transported with patient     Patient transported with:   Registered Nurse    MAP (Monitor): (!) 56 =Monitored (most recent)  Vitals w/ MEWS Score (last day)     Date/Time MEWS Score Pulse Resp Temp BP Level of Consciousness SpO2    08/09/19 2345  2  71  28  97.6 °F (36.4 °C)  108/41  Alert  95 %    08/09/19 2330    70  24    (!) 98/33    97 %    08/09/19 2315    71  24    96/72    93 %    08/09/19 2300    72  29    93/46    93 %    08/09/19 2245    72  20    97/52    93 %    08/09/19 2230    75  24    95/46    94 %    08/09/19 2100    88  20    110/49    95 %    08/09/19 2045    97  25    (!) 120/39    91 %    08/09/19 2030    92  24    96/57    91 %    08/09/19 2015    87  28    103/44    94 %    08/09/19 2000    83  16    125/42    93 %    08/09/19 1945    84  24    118/60    94 %    08/09/19 1930   87  20    118/79    92 %    08/09/19 1915    92  16    114/51    92 %    08/09/19 1836        97.9 °F (36.6 °C)    Alert      08/09/19 1823    93  26    (!) 83/58    93 %              Septic Patients:     Lactic Acid  Lab Results   Component Value Date    [facility] 1.81 08/09/2019    (Most recent on top)  Repeat drawn: NO Time: N/A       ALL LACTIC ACIDS GREATER THAN 2 MUST BE REPEATED POC WITHIN 4 HOURS OR PRIOR TO ADMISSION               Total Fluid Bolus initiated and documented on MAR: NO    All ordered antibiotics initiated within first 3 hours of TIME ZERO?   NO

## 2019-08-11 ENCOUNTER — APPOINTMENT (OUTPATIENT)
Dept: NON INVASIVE DIAGNOSTICS | Age: 79
DRG: 067 | End: 2019-08-11
Attending: HOSPITALIST
Payer: MEDICARE

## 2019-08-11 LAB
ANION GAP SERPL CALC-SCNC: 7 MMOL/L (ref 3–18)
APTT PPP: 28.3 SEC (ref 23–36.4)
ATRIAL RATE: 94 BPM
BUN SERPL-MCNC: 11 MG/DL (ref 7–18)
BUN/CREAT SERPL: 12 (ref 12–20)
CALCIUM SERPL-MCNC: 11.1 MG/DL (ref 8.5–10.1)
CALCULATED P AXIS, ECG09: 80 DEGREES
CALCULATED R AXIS, ECG10: 20 DEGREES
CALCULATED T AXIS, ECG11: 120 DEGREES
CHLORIDE SERPL-SCNC: 112 MMOL/L (ref 100–111)
CHOLEST SERPL-MCNC: 145 MG/DL
CO2 SERPL-SCNC: 28 MMOL/L (ref 21–32)
CREAT SERPL-MCNC: 0.89 MG/DL (ref 0.6–1.3)
DIAGNOSIS, 93000: NORMAL
EST. AVERAGE GLUCOSE BLD GHB EST-MCNC: 120 MG/DL
GLUCOSE SERPL-MCNC: 85 MG/DL (ref 74–99)
HBA1C MFR BLD: 5.8 % (ref 4.2–5.6)
HDLC SERPL-MCNC: 37 MG/DL (ref 40–60)
HDLC SERPL: 3.9 {RATIO} (ref 0–5)
INR PPP: 1.2 (ref 0.8–1.2)
LDLC SERPL CALC-MCNC: 84.4 MG/DL (ref 0–100)
LIPID PROFILE,FLP: ABNORMAL
P-R INTERVAL, ECG05: 146 MS
POTASSIUM SERPL-SCNC: 2.7 MMOL/L (ref 3.5–5.5)
PROTHROMBIN TIME: 15.3 SEC (ref 11.5–15.2)
Q-T INTERVAL, ECG07: 376 MS
QRS DURATION, ECG06: 124 MS
QTC CALCULATION (BEZET), ECG08: 470 MS
SODIUM SERPL-SCNC: 147 MMOL/L (ref 136–145)
TRIGL SERPL-MCNC: 118 MG/DL (ref ?–150)
VENTRICULAR RATE, ECG03: 94 BPM
VLDLC SERPL CALC-MCNC: 23.6 MG/DL

## 2019-08-11 PROCEDURE — 36415 COLL VENOUS BLD VENIPUNCTURE: CPT

## 2019-08-11 PROCEDURE — 93308 TTE F-UP OR LMTD: CPT

## 2019-08-11 PROCEDURE — 80048 BASIC METABOLIC PNL TOTAL CA: CPT

## 2019-08-11 PROCEDURE — 80061 LIPID PANEL: CPT

## 2019-08-11 PROCEDURE — 74011250636 HC RX REV CODE- 250/636: Performed by: PHYSICIAN ASSISTANT

## 2019-08-11 PROCEDURE — 77030021352 HC CBL LD SYS DISP COVD -B

## 2019-08-11 PROCEDURE — 74011000258 HC RX REV CODE- 258: Performed by: NURSE PRACTITIONER

## 2019-08-11 PROCEDURE — 74011250636 HC RX REV CODE- 250/636: Performed by: NURSE PRACTITIONER

## 2019-08-11 PROCEDURE — 74011250636 HC RX REV CODE- 250/636: Performed by: HOSPITALIST

## 2019-08-11 PROCEDURE — 85610 PROTHROMBIN TIME: CPT

## 2019-08-11 PROCEDURE — 85730 THROMBOPLASTIN TIME PARTIAL: CPT

## 2019-08-11 PROCEDURE — 83036 HEMOGLOBIN GLYCOSYLATED A1C: CPT

## 2019-08-11 PROCEDURE — 65270000029 HC RM PRIVATE

## 2019-08-11 RX ORDER — POTASSIUM CHLORIDE 7.45 MG/ML
10 INJECTION INTRAVENOUS
Status: COMPLETED | OUTPATIENT
Start: 2019-08-11 | End: 2019-08-11

## 2019-08-11 RX ADMIN — PIPERACILLIN SODIUM,TAZOBACTAM SODIUM 3.38 G: 3; .375 INJECTION, POWDER, FOR SOLUTION INTRAVENOUS at 11:33

## 2019-08-11 RX ADMIN — POTASSIUM CHLORIDE 10 MEQ: 7.46 INJECTION, SOLUTION INTRAVENOUS at 13:10

## 2019-08-11 RX ADMIN — POTASSIUM CHLORIDE 10 MEQ: 7.46 INJECTION, SOLUTION INTRAVENOUS at 15:00

## 2019-08-11 RX ADMIN — POTASSIUM CHLORIDE 10 MEQ: 7.46 INJECTION, SOLUTION INTRAVENOUS at 10:00

## 2019-08-11 RX ADMIN — Medication 10 ML: at 14:51

## 2019-08-11 RX ADMIN — Medication 10 ML: at 11:39

## 2019-08-11 RX ADMIN — POTASSIUM CHLORIDE 10 MEQ: 7.46 INJECTION, SOLUTION INTRAVENOUS at 11:10

## 2019-08-11 RX ADMIN — LEVOFLOXACIN 750 MG: 5 INJECTION, SOLUTION INTRAVENOUS at 20:35

## 2019-08-11 RX ADMIN — PIPERACILLIN SODIUM,TAZOBACTAM SODIUM 3.38 G: 3; .375 INJECTION, POWDER, FOR SOLUTION INTRAVENOUS at 19:01

## 2019-08-11 RX ADMIN — SODIUM CHLORIDE 1000 MG: 900 INJECTION, SOLUTION INTRAVENOUS at 11:13

## 2019-08-11 RX ADMIN — POTASSIUM CHLORIDE 10 MEQ: 7.46 INJECTION, SOLUTION INTRAVENOUS at 14:00

## 2019-08-11 RX ADMIN — POTASSIUM CHLORIDE 10 MEQ: 7.46 INJECTION, SOLUTION INTRAVENOUS at 12:10

## 2019-08-11 RX ADMIN — ENOXAPARIN SODIUM 40 MG: 40 INJECTION SUBCUTANEOUS at 12:37

## 2019-08-11 NOTE — PROGRESS NOTES
Progress Note      Patient: Crystal Benítez               Sex: female          DOA: 8/9/2019       YOB: 1940      Age:  70 y.o.        LOS:  LOS: 2 days             CHIEF COMPLAINT:  Weakness and encephalopathy improving    Subjective:     Patient is alert today. No facial droop    Objective:      Visit Vitals  BP (!) 116/38 (BP 1 Location: Left arm, BP Patient Position: At rest)   Pulse 73   Temp 97.7 °F (36.5 °C)   Resp 20   Ht 4' 10\" (1.473 m)   Wt 39.9 kg (88 lb)   SpO2 100%   BMI 18.39 kg/m²       Physical Exam:  Gen:  No distress, no complaint  Lungs:  Clear bilaterally, no wheeze or rhonchi  Heart:  Regular rate and rhythm, no murmurs or gallops  Abdomen:  Soft, non-tender, normal bowel sounds        Lab/Data Reviewed:  BMP:   Lab Results   Component Value Date/Time     (H) 08/11/2019 03:52 AM    K 2.7 (LL) 08/11/2019 03:52 AM     (H) 08/11/2019 03:52 AM    CO2 28 08/11/2019 03:52 AM    AGAP 7 08/11/2019 03:52 AM    GLU 85 08/11/2019 03:52 AM    BUN 11 08/11/2019 03:52 AM    CREA 0.89 08/11/2019 03:52 AM    GFRAA >60 08/11/2019 03:52 AM    GFRNA >60 08/11/2019 03:52 AM         Assessment/Plan     Principal Problem:    CVA (cerebral vascular accident) ([address] (8/9/2019)    UTI    Plan:  Per Neurology it seems this represents more improving encephalopathy from UTI than CVA. Continue with antibiotics  Continue with PT  The patient does not ambulate almost at all at baseline. Will assess discharge disposition as UTI clears and baseline strength assessment complete. Discussed with patient and sister at the bedside.

## 2019-08-11 NOTE — PROGRESS NOTES
2000 Bedside, Verbal and Written shift change report given to [address] [place] [address] (oncoming nurse) by  Gracelyn Fothergill nurse). Report included the following information SBAR, Kardex, Intake/Output, MAR and Recent Results. Pt refusing to answer questions or complete NIH scale. Pt refusing to give her name or answer orientation questions. When asked if she knows her name or where she is she says \"NO, leave me alone, I don't want to talk to you\" Pt drowsy but response to touch and voice. Pt will open eyes and tell nurse to leave her alone that she does not want to answer any questions, then falls back asleep. Pt refuses to complete visual assessment but observed follow movement with eyes and making eye contact with staff throughout room. Pt refuses to look at [address] [facility] but no slurring or garbling of words noted. Pt refusing to perform limb assessments but seen moving arms and legs evenly, responds to sensation equally. Will notify MD of patients behaviors and continue to monitor. Spoke with MD Cesilia Swanson, continue to monitor and attempt to reassess when pt more cooperative. pt sleeping between care, will continue to monitor. 0000 Shift reassessment, pt condition unchanged, pt continuing to refuse to participate in NIH assessments. Pt awake and speaking clearly, moving limbs evenly, and responding to touch, but refuses to read or discuss NIH photos. MD durham, will continue to monitor and re-attempt in AM.      0400  Shift reassessment, pt condition unchanged, still uncooperative with care, still refusing to complete NIH assessments, MD durham, will continue to monitor. -- Bedside, Verbal and Written shift change report given to   (oncoming nurse) by Joe Herzog (offgoing nurse). Report included the following information SBAR, Kardex, Intake/Output, MAR and Recent Results. Skin assessment completed.

## 2019-08-11 NOTE — MANAGEMENT PLAN
Discharge/Transition Planning    Received call from daughter and she was concerned cause physician spoke with her mother and Aunt in room and pt had told physician that she ambulated at home and  helped her out and no concerns. Daughter states again pt cannot go back to house as she cannot care for self and pt  has Alzheimer's and cannot care for her. Notified her will make sure this is passed along and addressed.        Jason Leon RN BSN  Outcomes Manager  Pager # [phone]

## 2019-08-11 NOTE — PROGRESS NOTES
Bedside and Verbal shift change report given to Kacey Darby, RN (oncoming nurse) by Ana Rosa Ugalde RN (offgoing nurse). Report included the following information SBAR, Kardex, MAR and Recent Results. Skin man: Ana Rosa Ugalde, [address] echo called [phone]     2311 pt had decreased LOC a rapid was called pt. Was sat up and sternal rub applied pt woke up.  VS taken and rapid discontinued     1200 New IV placed

## 2019-08-11 NOTE — PROGRESS NOTES
Nutrition initial assessment/Plan of care      RECOMMENDATIONS:   1. 2g Na Diet (Liberalized to promote PO intake)  2. Ensure Enlive daily at breakfast  3. Monitor labs, weight and PO intake  4. RD to follow     GOALS:   1. PO intake meets >75% of protein/calorie needs by 8/16  2. Weight Maintenance/gradual gain (1-2 lb/week) by 8/18     ASSESSMENT:   Wt status is classified as underweight per Body mass index is 16.22 kg/m². Pt with a 10.4 lb or 11.8% loss from UBW 88 lb, but unsure of time frame. Unsure of PO intake; good per Pt? Ensure Enlive daily for additional kcal/protein. Labs noted. Pt w/ hypernatremia, hypokalemia and hypercalcemia. A1c (5.8%) and lipid panel WNL. Nutrition recommendations listed. RD to follow. Nutrition Diagnoses:   Unintentional weight loss related to inadequate energy intake as evidenced by a 10.4 lb or 11.8% loss from UBW 88 lb. Nutrition Risk:  [] High  [x] Moderate []  Low    SUBJECTIVE/OBJECTIVE:   Pt admitted for CVA. MST received for 2-13 lb recent weight loss. Pt seen in room; appears thin with noted muscle wasting an SQ fat loss. Denies having any food allergies or problems chewing/swallowing; UBW 88 lb. Reports having a good appetite and consuming 3 meals and 1 Ensure per day at home. Used bed scale during visit; 77.6 lb. Stated she has lost weight and per documented records with UBW 88 lb equates to a 10.4 lb or 11.8% loss but unsure over what time frame. Added Ensure Enlive daily for now and will assess PO intake of meals prior to increasing frequency of supplements. Discussed importance of adequate nutrition and continuing to consume nutritional supplements at home as well. Provided menu and encouraged to implement preferences with dietary. Will continue to monitor.      Information Obtained from:    [x] Chart Review   [x] Patient   [] Family/Caregiver   [] Nurse/Physician   [] Interdisciplinary Meeting/Rounds      Diet: Cardiac Diet  Medications: [x] Reviewed Allergies: [x] Reviewed   Encounter Diagnoses     ICD-10-CM ICD-9-CM   1. Cerebrovascular accident (CVA), unspecified mechanism ([facility] I63.9 434.91   2. Acute cystitis without hematuria N30.00 595.0   3. Hypercalcemia E83.52 275.42     Past Medical History:   Diagnosis Date    Rheumatoid arthritis, adult ([facility]     Tachycardia       Labs:    Lab Results   Component Value Date/Time    Sodium 147 (H) 08/11/2019 03:52 AM    Potassium 2.7 (LL) 08/11/2019 03:52 AM    Chloride 112 (H) 08/11/2019 03:52 AM    CO2 28 08/11/2019 03:52 AM    Anion gap 7 08/11/2019 03:52 AM    Glucose 85 08/11/2019 03:52 AM    BUN 11 08/11/2019 03:52 AM    Creatinine 0.89 08/11/2019 03:52 AM    Calcium 11.1 (H) 08/11/2019 03:52 AM    Magnesium 1.6 (L) 02/23/2017 08:50 AM    Phosphorus 2.8 08/17/2010 05:45 AM    Albumin 3.3 (L) 08/09/2019 06:48 PM     Lab Results   Component Value Date/Time    Cholesterol, total 145 08/11/2019 03:52 AM    HDL Cholesterol 37 (L) 08/11/2019 03:52 AM    LDL, calculated 84.4 08/11/2019 03:52 AM    VLDL, calculated 23.6 08/11/2019 03:52 AM    Triglyceride 118 08/11/2019 03:52 AM    CHOL/HDL Ratio 3.9 08/11/2019 03:52 AM       Anthropometrics: BMI (calculated): 16.2  Last 3 Recorded Weights in this Encounter    08/10/19 1238 08/11/19 0905 08/11/19 1915   Weight: 39.9 kg (88 lb) 39.9 kg (88 lb) 35.2 kg (77 lb 9.6 oz)      Ht Readings from Last 1 Encounters:   08/11/19 4' 10\" (1.473 m)     Weight Metrics 8/11/2019 2/22/2017 6/19/2015 4/7/2015 5/16/2013   Weight 77 lb 9.6 oz 88 lb 92 lb 94 lb 102 lb   BMI 16.22 kg/m2 17.77 kg/m2 18.57 kg/m2 18.98 kg/m2 19.92 kg/m2       No data found.     IBW: 90 lb %IBW: 86% UBW: 88 lb   [x] Weight Loss [] Weight Gain [] Weight Stable     Estimated Nutrition Needs:  [x] Other: 30-35 kcal/kg  Calories: 2979-5299 kcal Based on:   [x] Actual BW    Protein:   60-65 g Based on:   [x] Actual BW    Fluid:       1500 ml Based on:   [x] Actual BW      [x] No Cultural, Spiritism or ethnic dietary need identified.     [] Cultural, Nondenominational and ethnic food preferences identified and addressed     Wt Status:  [] Normal (18.6 - 24.9) [x] Underweight (<18.5) [] Overweight (25 - 29.9) [] Mild Obesity (30 - 34.9)  [] Moderate Obesity (35 - 39.9) [] Morbid Obesity (40+)       Nutrition Problems Identified:   [] Suboptimal PO intake   [] Food Allergies  [] Difficulty chewing/swallowing/poor dentition  [] Constipation/Diarrhea   [] Nausea/Vomiting   [] None  [x] Other: Unintentional weight loss    Plan:   [x] Therapeutic Diet  [x]  Obtained/adjusted food preferences/tolerances and/or snacks options   [x]  Supplements added   [] Occupational therapy following for feeding techniques  []  HS snack added   []  Modify diet texture   []  Modify diet for food allergies   []  Educate patient   []  Assist with menu selection   [x]  Monitor PO intake on meal rounds   [x]  Continue inpatient monitoring and intervention   [x]  Participated in discharge planning/Interdisciplinary rounds/Team meetings   []  Other:     Education Needs:   [] Not appropriate for teaching at this time due to:   [x] Identified and addressed    Nutrition Monitoring and Evaluation:  [x] Continue ongoing monitoring and intervention  [] Other    Sixto Stewart

## 2019-08-11 NOTE — PROGRESS NOTES
PHYSICAL THERAPY NOTE    11:21AM  Potassium 2.7. Will hold off PT today. Will re-attempt PT as schedule permits. George Robert.  Merlin Lambing

## 2019-08-11 NOTE — PROGRESS NOTES
Teleneurology Consult    Patient ID  Name:  Alexander Obando  :  1940  MRN:  [id number]  Age:  70 y.o. PCP:  Kelli Hogan MD    Subjective:     Encounter Date:  2019    Referring Physician: Dr. Dean Mckeon    Chief Complaint   Patient presents with    Dental Pain    Lethargy       History of Present Illness:   Alexander Obando is a 70 y.o. seen on telemedicine followup utilizing a telepresenter. More lethargic today. No family present today either.      Current Facility-Administered Medications   Medication Dose Route Frequency Provider Last Rate Last Dose    vancomycin (VANCOCIN) 1,000 mg in 0.9% sodium chloride (MBP/ADV) 250 mL adv  1,000 mg IntraVENous Q36H Benjie Narayan NP        [START ON 2019] VANCOMYCIN INFORMATION NOTE   Other ONCE Benjie Narayan NP        VANCOMYCIN INFORMATION NOTE   Other Rx Dosing/Monitoring Benjie Narayan NP        piperacillin-tazobactam (ZOSYN) 3.375 g in 0.9% sodium chloride (MBP/ADV) 100 mL MBP  3.375 g IntraVENous Q8H Benjie Narayan NP 25 mL/hr at 08/10/19 2328 3.375 g at 08/10/19 2328    sodium chloride (NS) flush 5-40 mL  5-40 mL IntraVENous Q8H Chata Ambrose MD   10 mL at 08/10/19 2329    sodium chloride (NS) flush 5-40 mL  5-40 mL IntraVENous PRN Chata Ambrose MD        enoxaparin (LOVENOX) injection 40 mg  40 mg SubCUTAneous Q24H Chata Ambrose MD   40 mg at 08/10/19 1440    sodium chloride (NS) flush 5-10 mL  5-10 mL IntraVENous PRN Chata Ambrose MD        levoFLOXacin (LEVAQUIN) 750 mg in D5W IVPB  750 mg IntraVENous Q48H Chata Ambrose MD         Allergies   Allergen Reactions    Aspirin Unknown (comments)    Codeine Unknown (comments)     Patient Active Problem List   Diagnosis Code    Hip fracture ([facility] S72.009A    CVA (cerebral vascular accident) ([facility] I63.9     Past Medical History:   Diagnosis Date    Rheumatoid arthritis, adult ([address]     Tachycardia       Past Surgical History:   Procedure Laterality Date    HX CAROTID ENDARTERECTOMY      left    HX CATARACT REMOVAL      bilateral    HX HIP REPLACEMENT      left      History reviewed. No pertinent family history. Social History     Socioeconomic History    Marital status:      Spouse name: Not on file    Number of children: Not on file    Years of education: Not on file    Highest education level: Not on file   Tobacco Use    Smoking status: Current Every Day Smoker     Packs/day: 1.50    Smokeless tobacco: Never Used   Substance and Sexual Activity    Alcohol use: No    Drug use: No       Review of Systems:  Pertinent items are noted in HPI. Objective:     Vitals:    08/11/19 0021 08/11/19 0501 08/11/19 0725 08/11/19 0905   BP: 127/42 120/45 122/54 122/54   Pulse: 79 90 88    Resp: 16 16 18    Temp: 97.9 °F (36.6 °C) 97.7 °F (36.5 °C) 97.8 °F (36.6 °C)    SpO2: 93% 92% 94%    Weight:    39.9 kg (88 lb)   Height:    4' 10\" (1.473 m)       Physical Exam:    General:  Patient seen via telemedicine video encounter utilizing a tele-presenter. Sleeping and NAD. NEUROLOGICAL EXAMINATION:     Mental Status:  Sleeping, arouses to stimuli but refuses to open eyes or talk, follows commands    Cranial Nerves:    II, III, IV, VI:  Visual fields are normal to confrontation. Pupils symmetric and reactive  Extra-ocular movements are full and fluid to dolls eyes  No ptosis or nystagmus. V-XII:   Face is asymmetric with right NLF  Normal sensation. The palate rises symmetrically and the tongue protrudes midline. Sternocleidomastoids 5/5.       Motor Examination: Strength 3-4/5 in RUE  No involuntary movements, Normal tone    Sensory exam:  Normal throughout to light touch per telepresenter  Coordination:  No dysmetria, No resting or intention tremor    Gait and Station:  Not tested  Reflexes:  Not tested by telepresenter  Labs  Recent Results (from the past 24 hour(s))   METABOLIC PANEL, BASIC    Collection Time: 08/11/19  3:52 AM   Result Value Ref Range    Sodium 147 (H) 136 - 145 mmol/L    Potassium 2.7 (LL) 3.5 - 5.5 mmol/L    Chloride 112 (H) 100 - 111 mmol/L    CO2 28 21 - 32 mmol/L    Anion gap 7 3.0 - 18 mmol/L    Glucose 85 74 - 99 mg/dL    BUN 11 7.0 - 18 MG/DL    Creatinine 0.89 0.6 - 1.3 MG/DL    BUN/Creatinine ratio 12 12 - 20      GFR est AA >60 >60 ml/min/1.73m2    GFR est non-AA >60 >60 ml/min/1.73m2    Calcium 11.1 (H) 8.5 - 10.1 MG/DL   LIPID PANEL    Collection Time: 08/11/19  3:52 AM   Result Value Ref Range    LIPID PROFILE          Cholesterol, total 145 <200 MG/DL    Triglyceride 118 <150 MG/DL    HDL Cholesterol 37 (L) 40 - 60 MG/DL    LDL, calculated 84.4 0 - 100 MG/DL    VLDL, calculated 23.6 MG/DL    CHOL/HDL Ratio 3.9 0 - 5.0     HEMOGLOBIN A1C WITH EAG    Collection Time: 08/11/19  3:52 AM   Result Value Ref Range    Hemoglobin A1c 5.8 (H) 4.2 - 5.6 %    Est. average glucose 120 mg/dL        Relevant Radiology:  No results found.       Impression:   1) Suspected CVA vs. Exacerbation of old CVA with UTI  2) Altered mental status   3) Hypokalemia  4) UTI (on multiple abx)    Plan:   Rapid response team for altered mental status  Awaiting report of echo  Awaiting MRI/A of Brain and neck and changed to stat given interval change, If not able to be done will switch to CT to be repeated/CTA  Reportedly allergic to ASA and statins per outpatient history  D/W RN at bedside        Signed By:  Mikel Mccartney DO     8/11/2019    [facility] for Inpatient Consultation

## 2019-08-12 ENCOUNTER — APPOINTMENT (OUTPATIENT)
Dept: MRI IMAGING | Age: 79
DRG: 067 | End: 2019-08-12
Attending: PHYSICIAN ASSISTANT
Payer: MEDICARE

## 2019-08-12 PROBLEM — R63.6 UNDERWEIGHT: Status: ACTIVE | Noted: 2019-08-12

## 2019-08-12 LAB
ANION GAP SERPL CALC-SCNC: 9 MMOL/L (ref 3–18)
BUN SERPL-MCNC: 12 MG/DL (ref 7–18)
BUN/CREAT SERPL: 15 (ref 12–20)
CALCIUM SERPL-MCNC: 10.3 MG/DL (ref 8.5–10.1)
CHLORIDE SERPL-SCNC: 110 MMOL/L (ref 100–111)
CO2 SERPL-SCNC: 22 MMOL/L (ref 21–32)
CREAT SERPL-MCNC: 0.82 MG/DL (ref 0.6–1.3)
GLUCOSE SERPL-MCNC: 83 MG/DL (ref 74–99)
POTASSIUM SERPL-SCNC: 3.8 MMOL/L (ref 3.5–5.5)
SODIUM SERPL-SCNC: 141 MMOL/L (ref 136–145)
T4 FREE SERPL-MCNC: 1.2 NG/DL (ref 0.7–1.5)
VIT B12 SERPL-MCNC: 531 PG/ML (ref 211–911)

## 2019-08-12 PROCEDURE — 65270000029 HC RM PRIVATE

## 2019-08-12 PROCEDURE — 97535 SELF CARE MNGMENT TRAINING: CPT

## 2019-08-12 PROCEDURE — 74011250636 HC RX REV CODE- 250/636: Performed by: NURSE PRACTITIONER

## 2019-08-12 PROCEDURE — 84445 ASSAY OF TSI GLOBULIN: CPT

## 2019-08-12 PROCEDURE — 80048 BASIC METABOLIC PNL TOTAL CA: CPT

## 2019-08-12 PROCEDURE — 84439 ASSAY OF FREE THYROXINE: CPT

## 2019-08-12 PROCEDURE — 74011000258 HC RX REV CODE- 258: Performed by: NURSE PRACTITIONER

## 2019-08-12 PROCEDURE — 74011250636 HC RX REV CODE- 250/636: Performed by: HOSPITALIST

## 2019-08-12 PROCEDURE — 36415 COLL VENOUS BLD VENIPUNCTURE: CPT

## 2019-08-12 PROCEDURE — 92526 ORAL FUNCTION THERAPY: CPT

## 2019-08-12 PROCEDURE — 82607 VITAMIN B-12: CPT

## 2019-08-12 PROCEDURE — 74011250637 HC RX REV CODE- 250/637: Performed by: SPECIALIST

## 2019-08-12 PROCEDURE — 70544 MR ANGIOGRAPHY HEAD W/O DYE: CPT

## 2019-08-12 PROCEDURE — 70551 MRI BRAIN STEM W/O DYE: CPT

## 2019-08-12 PROCEDURE — 74011250637 HC RX REV CODE- 250/637: Performed by: HOSPITALIST

## 2019-08-12 PROCEDURE — 97530 THERAPEUTIC ACTIVITIES: CPT

## 2019-08-12 RX ORDER — LORAZEPAM 2 MG/ML
1 INJECTION INTRAMUSCULAR ONCE
Status: COMPLETED | OUTPATIENT
Start: 2019-08-12 | End: 2019-08-12

## 2019-08-12 RX ORDER — ATORVASTATIN CALCIUM 40 MG/1
40 TABLET, FILM COATED ORAL
Status: DISCONTINUED | OUTPATIENT
Start: 2019-08-12 | End: 2019-08-16 | Stop reason: HOSPADM

## 2019-08-12 RX ORDER — GADOTERATE MEGLUMINE 376.9 MG/ML
10 INJECTION INTRAVENOUS
Status: DISPENSED | OUTPATIENT
Start: 2019-08-12 | End: 2019-08-13

## 2019-08-12 RX ORDER — CLOPIDOGREL BISULFATE 75 MG/1
75 TABLET ORAL DAILY
Status: DISCONTINUED | OUTPATIENT
Start: 2019-08-12 | End: 2019-08-16 | Stop reason: HOSPADM

## 2019-08-12 RX ADMIN — SODIUM CHLORIDE 750 MG: 900 INJECTION, SOLUTION INTRAVENOUS at 11:17

## 2019-08-12 RX ADMIN — Medication 10 ML: at 22:24

## 2019-08-12 RX ADMIN — CLOPIDOGREL BISULFATE 75 MG: 75 TABLET ORAL at 11:17

## 2019-08-12 RX ADMIN — Medication 10 ML: at 14:00

## 2019-08-12 RX ADMIN — PIPERACILLIN SODIUM,TAZOBACTAM SODIUM 3.38 G: 3; .375 INJECTION, POWDER, FOR SOLUTION INTRAVENOUS at 21:00

## 2019-08-12 RX ADMIN — LORAZEPAM 1 MG: 2 INJECTION, SOLUTION INTRAMUSCULAR; INTRAVENOUS at 15:40

## 2019-08-12 RX ADMIN — ATORVASTATIN CALCIUM 40 MG: 40 TABLET, FILM COATED ORAL at 22:08

## 2019-08-12 RX ADMIN — PIPERACILLIN SODIUM,TAZOBACTAM SODIUM 3.38 G: 3; .375 INJECTION, POWDER, FOR SOLUTION INTRAVENOUS at 11:17

## 2019-08-12 RX ADMIN — Medication 10 ML: at 06:09

## 2019-08-12 RX ADMIN — Medication 10 ML: at 04:32

## 2019-08-12 RX ADMIN — ENOXAPARIN SODIUM 40 MG: 40 INJECTION SUBCUTANEOUS at 13:00

## 2019-08-12 RX ADMIN — PIPERACILLIN SODIUM,TAZOBACTAM SODIUM 3.38 G: 3; .375 INJECTION, POWDER, FOR SOLUTION INTRAVENOUS at 04:29

## 2019-08-12 NOTE — PROGRESS NOTES
Problem: Dysphagia (Adult)  Goal: *Acute Goals and Plan of Care (Insert Text)  Description  Patient will:  1. Tolerate PO trials with 0 s/s overt distress in 4/5 trials   2. Utilize compensatory swallow strategies/maneuvers (decrease bite/sip, size/rate, alt. liq/sol) with min cues in 4/5 trials     Recommend:   Regular diet with thin liquids  Meds as tolerated  Aspiration precautions  HOB >45 degrees during all intake and for at least 30 min after po   Small bites/sips, Slow rate of intake       Outcome: Progressing Towards Goal     SPEECH LANGUAGE PATHOLOGY DYSPHAGIA TREATMENT    Patient: Helen Maurer (62 y.o. female)  Date: 8/12/2019  Diagnosis: CVA (cerebral vascular accident) ([facility] [I63.9] CVA (cerebral vascular accident) ([facility]       Precautions: Aspiration Fall  PLOF: Living with family     ASSESSMENT:  Followed up with dysphagia intervention. A&Ox3.  at bedside. Patient observed self-feeding thin liquid + straw, puree and solid trials. Pt exhibited mildly delayed mastication of solids with otherwise adequate bolus cohesion, manipulation and A-P transit. Further exhibited adequate swallow timing/reflex and hyolaryngeal excursion. Able to manipulate and clear with 0 clinical s/s aspiration. Pt independently utilized slow feeding rate, small sips/bites. Recommend patient continue regular solid, thin liquid diet. SLP will follow up 1-2x to assess diet tolerance and education. Progression toward goals:  ?         Improving appropriately and progressing toward goals  ? Improving slowly and progressing toward goals  ? Not making progress toward goals and plan of care will be adjusted     PLAN:  Recommendations and Planned Interventions:  Regular/thin  Patient continues to benefit from skilled intervention to address the above impairments. Continue treatment per established plan of care. Discharge Recommendations:   To Be Determined     SUBJECTIVE:   Patient stated I don't have any trouble eating. OBJECTIVE:   Cognitive and Communication Status:  Neurologic State: Alert  Orientation Level: Oriented X4  Cognition: Follows commands  Perception: Appears intact  Perseveration: No perseveration noted  Safety/Judgement: Decreased insight into deficits  Dysphagia Treatment:  Oral Assessment:  Oral Assessment  Labial: Right droop  Dentition: Natural, Intact  Oral Hygiene: Good  Lingual: No impairment  Velum: No impairment  Mandible: No impairment  P.O. Trials:   Patient Position: 45 at [facility]   Vocal quality prior to P.O.: Hoarse   Consistency Presented: Thin liquid, Solid   How Presented: Self-fed/presented, Cup/sip, Straw, Successive swallows, Spoon       Bolus Acceptance: No impairment   Bolus Formation/Control: No impairment       Propulsion: No impairment   Oral Residue: None   Initiation of Swallow: No impairment   Laryngeal Elevation: Functional   Aspiration Signs/Symptoms: Delayed cough/throat clear(x1)   Pharyngeal Phase Characteristics: No impairment, issues, or problems    Effective Modifications: Small sips and bites             Oral Phase Severity: No impairment   Pharyngeal Phase Severity : Mild     PAIN:  Pain level pre-treatment: 0/10   Pain level post-treatment: 0/10     After treatment:   ?              Patient left in no apparent distress sitting up in chair  ? Patient left in no apparent distress in bed  ? Call bell left within reach  ? Nursing notified  ? Family present  ? Caregiver present  ? Bed alarm activated      COMMUNICATION/EDUCATION:   ? Aspiration precautions; swallow safety; compensatory techniques  ? Patient unable to participate in education; education ongoing with staff  ? Posted safety precautions in patient's room.   ? Oral-motor/laryngeal strengthening exercises      Samra Hunt SLP  Time Calculation: 18 mins

## 2019-08-12 NOTE — CDMP QUERY
Patient admitted with weakness and facial droop, diagnosed  with CVA, , noted to have bmi of 18, Dietary assessment indicates that this pt is Underweight , if you concur, please document in progress notes and d/c summary if you are evaluating and/or treating any of the following: 
 
=>Underweight 
=>Cachexia 
=>Failure to Thrive 
=>Other explanation of clinical findings =>Clinically Undetermined (no explanation for clinical findings) The medical record reflects the following: 
   Risk Factors: age, Clinical Indicators:  
ASSESSMENT:  
Wt status is classified as underweight per Body mass index is 16.22 kg/m². Pt with a 10.4 lb or 11.8% loss from UBW 88 lb, but unsure of time frame. Unsure of PO intake; good per Pt? Ensure Enlive daily for additional kcal/protein. Labs noted. Pt w/ hypernatremia, hypokalemia and hypercalcemia. A1c (5.8%) and lipid panel WNL. Nutrition recommendations listed. RD to follow.  
  
Nutrition Diagnoses:  
Unintentional weight loss related to inadequate energy intake as evidenced by a 10.4 lb or 11.8% loss from UBW 88 lb. 
  
 
   Treatment:  Ensure  Leopold Camps Thank Meaghan Godoy RN/CDI 
[phone]

## 2019-08-12 NOTE — ROUTINE PROCESS
1130: Care assumed of pt at this time. No acute distress noted. Pt has been refusing MRI. Will attempt to try to get MRI today. Will monitor. 1230: Pt family member found down on the floor. Upon entering the room strong smoke smell noted. Notified Charge nurse Kassidy RECINOS. Cherry gonzáles called at this time. Pt  refusing to go to the ED to be seen. Charge nurse notified. 1350: Pt off the unit to MRI. 
 
1500: Changed pt room for privacy purposes. 1530: Spoke with MRI. Pt unable to lie still. Notified Dr. Darlene Albarado. Orders received for 1 mg ativan stat once. 1545: IV ativan administered. Pt appears to be resting o2 sats assessed and are 90-92%. No acute distress noted. Respirations 14 brpm. Will monitor. 1630: Pt arrived back to the unit. Family at the bedside. Pt changed and repositioned. Will monitor.

## 2019-08-12 NOTE — PROGRESS NOTES
Chart reviewed. Plan remains SNF, has been accepted to [facility], will need [facility]. Will cont to follow. Jade Davenport RN,ext [phone].

## 2019-08-12 NOTE — ROUTINE PROCESS
Bedside and Verbal shift change report given to Prashant Pagan RN (oncoming nurse) by Eli Munoz RN (offgoing nurse). Report included the following information SBAR, Kardex, MAR and Recent Results.

## 2019-08-12 NOTE — PROGRESS NOTES
Progress Note      Patient: Pavel Abdullahi               Sex: female          DOA: 8/9/2019       YOB: 1940      Age:  70 y.o.        LOS:  LOS: 3 days             CHIEF COMPLAINT:  Weakness and encephalopathy improving    Subjective:     Patient is alert today but still disoriented  She wants to go home    Objective:      Visit Vitals  BP (!) 86/54 (BP 1 Location: Left arm, BP Patient Position: At rest)   Pulse 77   Temp 98.1 °F (36.7 °C)   Resp 16   Ht 4' 10\" (1.473 m)   Wt 35.2 kg (77 lb 9.6 oz)   SpO2 95%   BMI 16.22 kg/m²       Physical Exam:  Gen:  No distress, no complaint  Lungs:  Clear bilaterally, no wheeze or rhonchi  Heart:  Regular rate and rhythm, no murmurs or gallops  Abdomen:  Soft, non-tender, normal bowel sounds  Neuro: alert, oriented to name, not to date       Lab/Data Reviewed:  BMP:   Lab Results   Component Value Date/Time     08/12/2019 04:29 AM    K 3.8 08/12/2019 04:29 AM     08/12/2019 04:29 AM    CO2 22 08/12/2019 04:29 AM    AGAP 9 08/12/2019 04:29 AM    GLU 83 08/12/2019 04:29 AM    BUN 12 08/12/2019 04:29 AM    CREA 0.82 08/12/2019 04:29 AM    GFRAA >60 08/12/2019 04:29 AM    GFRNA >60 08/12/2019 04:29 AM         Assessment/Plan     Principal Problem:    CVA (cerebral vascular accident) ([address] (8/9/2019)    Active Problems:    Underweight (8/12/2019)    UTI    Plan:  Neuro suspecting CVA and early dementia. Awaiting MRI/MRA. Recommending to start plavix and statin. Continue with antibiotics  Continue with PT  The patient does not ambulate almost at all at baseline. Will assess discharge disposition as UTI clears and baseline strength assessment complete. Discussed with patient and  at the bedside.

## 2019-08-12 NOTE — PROGRESS NOTES
Problem: Pressure Injury - Risk of  Goal: *Prevention of pressure injury  Description  Document Bassem Scale and appropriate interventions in the flowsheet. Outcome: Progressing Towards Goal  Note:   Pressure Injury Interventions:  Sensory Interventions: Assess changes in LOC, Pressure redistribution bed/mattress (bed type)    Moisture Interventions: Absorbent underpads    Activity Interventions: Pressure redistribution bed/mattress(bed type), Increase time out of bed, PT/OT evaluation    Mobility Interventions: HOB 30 degrees or less, PT/OT evaluation, Pressure redistribution bed/mattress (bed type)    Nutrition Interventions: Document food/fluid/supplement intake    Friction and Shear Interventions: Apply protective barrier, creams and emollients, Minimize layers                Problem: Patient Education: Go to Patient Education Activity  Goal: Patient/Family Education  Outcome: Progressing Towards Goal     Problem: Falls - Risk of  Goal: *Absence of Falls  Description  Document Laurence Fall Risk and appropriate interventions in the flowsheet.   Outcome: Progressing Towards Goal  Note:   Fall Risk Interventions:  Mobility Interventions: Bed/chair exit alarm, Patient to call before getting OOB         Medication Interventions: Bed/chair exit alarm, Patient to call before getting OOB    Elimination Interventions: Bed/chair exit alarm, Patient to call for help with toileting needs, Toileting schedule/hourly rounds              Problem: Patient Education: Go to Patient Education Activity  Goal: Patient/Family Education  Outcome: Progressing Towards Goal     Problem: Patient Education: Go to Patient Education Activity  Goal: Patient/Family Education  Outcome: Progressing Towards Goal     Problem: TIA/CVA Stroke: Day 2 Until Discharge  Goal: Off Pathway (Use only if patient is Off Pathway)  Outcome: Progressing Towards Goal  Goal: Activity/Safety  Outcome: Progressing Towards Goal  Goal: Diagnostic Test/Procedures  Outcome: Progressing Towards Goal  Goal: Nutrition/Diet  Outcome: Progressing Towards Goal  Goal: Discharge Planning  Outcome: Progressing Towards Goal  Goal: Medications  Outcome: Progressing Towards Goal  Goal: Respiratory  Outcome: Progressing Towards Goal  Goal: Treatments/Interventions/Procedures  Outcome: Progressing Towards Goal  Goal: Psychosocial  Outcome: Progressing Towards Goal  Goal: *Verbalizes anxiety and depression are reduced or absent  Outcome: Progressing Towards Goal  Goal: *Absence of aspiration  Outcome: Progressing Towards Goal  Goal: *Absence of deep venous thrombosis signs and symptoms(Stroke Metric)  Outcome: Progressing Towards Goal  Goal: *Optimal pain control at patient's stated goal  Outcome: Progressing Towards Goal  Goal: *Tolerating diet  Outcome: Progressing Towards Goal  Goal: *Ability to perform ADLs and demonstrates progressive mobility and function  Outcome: Progressing Towards Goal  Goal: *Stroke education continued(Stroke Metric)  Outcome: Progressing Towards Goal     Problem: Ischemic Stroke: Discharge Outcomes  Goal: *Verbalizes anxiety and depression are reduced or absent  Outcome: Progressing Towards Goal  Goal: *Verbalize understanding of risk factor modification(Stroke Metric)  Outcome: Progressing Towards Goal  Goal: *Hemodynamically stable  Outcome: Progressing Towards Goal  Goal: *Absence of aspiration pneumonia  Outcome: Progressing Towards Goal  Goal: *Aware of needed dietary changes  Outcome: Progressing Towards Goal  Goal: *Verbalize understanding of prescribed medications including anti-coagulants, anti-lipid, and/or anti-platelets(Stroke Metric)  Outcome: Progressing Towards Goal  Goal: *Tolerating diet  Outcome: Progressing Towards Goal  Goal: *Aware of follow-up diagnostics related to anticoagulants  Outcome: Progressing Towards Goal  Goal: *Ability to perform ADLs and demonstrates progressive mobility and function  Outcome: Progressing Towards Goal  Goal: *Absence of DVT(Stroke Metric)  Outcome: Progressing Towards Goal  Goal: *Absence of aspiration  Outcome: Progressing Towards Goal  Goal: *Optimal pain control at patient's stated goal  Outcome: Progressing Towards Goal  Goal: *Home safety concerns addressed  Outcome: Progressing Towards Goal  Goal: *Describes available resources and support systems  Outcome: Progressing Towards Goal  Goal: *Verbalizes understanding of activation of EMS(911) for stroke symptoms(Stroke Metric)  Outcome: Progressing Towards Goal  Goal: *Understands and describes signs and symptoms to report to providers(Stroke Metric)  Outcome: Progressing Towards Goal  Goal: *Neurolgocially stable (absence of additional neurological deficits)  Outcome: Progressing Towards Goal  Goal: *Verbalizes importance of follow-up with primary care physician(Stroke Metric)  Outcome: Progressing Towards Goal  Goal: *Smoking cessation discussed,if applicable(Stroke Metric)  Outcome: Progressing Towards Goal  Goal: *Depression screening completed(Stroke Metric)  Outcome: Progressing Towards Goal     Problem: Pain  Goal: *Control of Pain  Outcome: Progressing Towards Goal  Goal: *PALLIATIVE CARE:  Alleviation of Pain  Outcome: Progressing Towards Goal     Problem: Patient Education: Go to Patient Education Activity  Goal: Patient/Family Education  Outcome: Progressing Towards Goal     Problem: Discharge Planning  Goal: *Discharge to safe environment  Outcome: Progressing Towards Goal     Problem: Patient Education: Go to Patient Education Activity  Goal: Patient/Family Education  Outcome: Progressing Towards Goal     Problem: Patient Education: Go to Patient Education Activity  Goal: Patient/Family Education  Outcome: Progressing Towards Goal     Problem: Patient Education: Go to Patient Education Activity  Goal: Patient/Family Education  Outcome: Progressing Towards Goal     Problem: Nutrition Deficit  Goal: *Optimize nutritional status  Outcome: Progressing Towards Goal

## 2019-08-12 NOTE — PROGRESS NOTES
Teleneurology Consult    Patient ID  Name:  Jared Rueda  :  1940  MRN:  [id number]  Age:  70 y.o. PCP:  Stephanie Lee MD    Subjective:     Encounter Date:  2019    Referring Physician: No ref. provider found    Chief Complaint   Patient presents with    Dental Pain    Lethargy       History of Present Illness:   Jared Rueda is a 70 y.o. seen on telemedicine followup utilizing a telepresenter. Much better today. Spouse at side.      Current Facility-Administered Medications   Medication Dose Route Frequency Provider Last Rate Last Dose    vancomycin (VANCOCIN) 750 mg in 0.9% sodium chloride 250 mL IVPB  750 mg IntraVENous Q18H Josseline Ward MD        [START ON 2019] VANCOMYCIN INFORMATION NOTE   Other ONCE Josseline Ward MD        [START ON 2019] VANCOMYCIN INFORMATION NOTE   Other ONCE Josie Narayan, MARLON        VANCOMYCIN INFORMATION NOTE   Other Rx Dosing/Monitoring Josie Narayan, NP        piperacillin-tazobactam (ZOSYN) 3.375 g in 0.9% sodium chloride (MBP/ADV) 100 mL MBP  3.375 g IntraVENous Q8H Josie Narayan, NP 25 mL/hr at 19 0429 3.375 g at 19 0429    sodium chloride (NS) flush 5-40 mL  5-40 mL IntraVENous Q8H Josseline Ward MD   10 mL at 19 0609    sodium chloride (NS) flush 5-40 mL  5-40 mL IntraVENous PRN Josseline Ward MD        enoxaparin (LOVENOX) injection 40 mg  40 mg SubCUTAneous Q24H Josseline Ward MD   40 mg at 19 1237    sodium chloride (NS) flush 5-10 mL  5-10 mL IntraVENous PRN Josseline Ward MD        levoFLOXacin (LEVAQUIN) 750 mg in D5W IVPB  750 mg IntraVENous Q48H Josseline Ward  mL/hr at 19 2035 750 mg at 19     Allergies   Allergen Reactions    Aspirin Unknown (comments)    Codeine Unknown (comments)     Patient Active Problem List   Diagnosis Code    Hip fracture ([address] S72.009A    CVA (cerebral vascular accident) ([address] I63.9     Past Medical History:   Diagnosis Date    [facility]     Tachycardia       Past Surgical History:   Procedure Laterality Date    HX CAROTID ENDARTERECTOMY      left    HX CATARACT REMOVAL      bilateral    HX HIP REPLACEMENT      left      History reviewed. No pertinent family history. Social History     Socioeconomic History    Marital status:      Spouse name: Not on file    Number of children: Not on file    Years of education: Not on file    Highest education level: Not on file   Tobacco Use    Smoking status: Current Every Day Smoker     Packs/day: 1.50    Smokeless tobacco: Never Used   Substance and Sexual Activity    Alcohol use: No    Drug use: No       Review of Systems:  Pertinent items are noted in HPI. Objective:     Vitals:    08/11/19 1915 08/11/19 2031 08/12/19 0038 08/12/19 0431   BP:  115/53 112/49 115/51   Pulse:  66 67 68   Resp:  16 16 16   Temp:  98.7 °F (37.1 °C) 98.5 °F (36.9 °C) 98.6 °F (37 °C)   SpO2:  100% 100% 100%   Weight: 35.2 kg (77 lb 9.6 oz)      Height:           Physical Exam:    General:  Patient seen via telemedicine video encounter utilizing a tele-presenter. No acute distress. Eating breakfast    NEUROLOGICAL EXAMINATION:     Mental Status:  Alert, year and month correct    Cranial Nerves:    II, III, IV, VI:  Visual fields are normal to confrontation. Pupils are equal, round, and reactive to light and accommodation. Extra-ocular movements are full and fluid. No ptosis or nystagmus. V-XII:   Face is slightly weak on right  Normal sensation. The palate rises symmetrically and the tongue protrudes midline. Sternocleidomastoids 5/5.       Motor Examination: Strength 4/5 on right   No involuntary movements, Normal tone    Sensory exam:  Normal throughout to light touch per telepresenter  Coordination:  No dysmetria, No resting or intention tremor    Gait and Station:  Not tested  Reflexes:  Not tested by telepresenter  Labs  Recent Results (from the past 24 hour(s))   PROTHROMBIN TIME + INR    Collection Time: 08/11/19 10:00 AM   Result Value Ref Range    Prothrombin time 15.3 (H) 11.5 - 15.2 sec    INR 1.2 0.8 - 1.2     PTT    Collection Time: 08/11/19 10:00 AM   Result Value Ref Range    aPTT 28.3 23.0 - 74.9 SEC   METABOLIC PANEL, BASIC    Collection Time: 08/12/19  4:29 AM   Result Value Ref Range    Sodium 141 136 - 145 mmol/L    Potassium 3.8 3.5 - 5.5 mmol/L    Chloride 110 100 - 111 mmol/L    CO2 22 21 - 32 mmol/L    Anion gap 9 3.0 - 18 mmol/L    Glucose 83 74 - 99 mg/dL    BUN 12 7.0 - 18 MG/DL    Creatinine 0.82 0.6 - 1.3 MG/DL    BUN/Creatinine ratio 15 12 - 20      GFR est AA >60 >60 ml/min/1.73m2    GFR est non-AA >60 >60 ml/min/1.73m2    Calcium 10.3 (H) 8.5 - 10.1 MG/DL        Relevant Radiology:   No results found. Impression:     CVA suspected with right sided weakness and ?confusion  Early dementia     Plan:     Awaiting MRI/MRA   Supportive care in interim  Labs for reversible causes of memory loss  Spouse does not know about allergies. Daughter reports no knowledge of clear aspirin allergy or prior use of any other antiplatelet agents. Also denies statin allergy issues . Plavix would be best option.      Signed By:  Yemi Rock DO     8/12/2019    [facility]

## 2019-08-12 NOTE — PROGRESS NOTES
Problem: Self Care Deficits Care Plan (Adult)  Goal: *Acute Goals and Plan of Care (Insert Text)  Description  Occupational Therapy Goals  Initiated 8/10/2019 within 7 day(s). 1.  Patient will perform rolling in bed with minimal assistance/contact guard assist to assist with pericare  2. Patient will perform static sitting EOB with minimal assistance to prepare for grooming activities. 3.  Patient will perform toilet transfer with moderate assistance . 4. Patient will participate in upper extremity therapeutic exercise/activities with independence for 7 minutes to increase endurance for functional activities. 7.  Patient will utilize energy conservation techniques during functional activities with verbal/tactile cues as needed. Outcome: Progressing Towards Goal   OCCUPATIONAL THERAPY TREATMENT    Patient: Eros Murguia (71 y.o. female)  Date: 8/12/2019  Diagnosis: CVA (cerebral vascular accident) [facility]) [I63.9] CVA (cerebral vascular accident) [facility])       Precautions: Fall  PLOF: Assist w/BADLs    Chart, occupational therapy assessment, plan of care, and goals were reviewed. ASSESSMENT:  Pt seen at bed for ADL grooming tasks and UB ADLs to increase AROM BUE and activity tolerance w/ADLs. Pt requires increase time w/container mgt 2/2 RA; educated on compensatory strategies using open palm method and one extremity as gross stabilizer for joint protection. Pt educated on benefits of built-up utensils for improved  on toothbrush. Decrease ROM/strength BUE and multiple line mgt require assist w/UB dressing task, donning/doffing hospital gown. Pt and family educated on energy conservation techniques w/ADLs. Progression toward goals:  ?          Improving appropriately and progressing toward goals  ? Improving slowly and progressing toward goals  ?           Not making progress toward goals and plan of care will be adjusted     PLAN:  Patient continues to benefit from skilled intervention to address the above impairments. Continue treatment per established plan of care. Discharge Recommendations:  Skilled Nursing Facility/LTC  Further Equipment Recommendations for Discharge:  bedside commode, hospital bed and rolling walker     SUBJECTIVE:   Patient stated This is kind of hard to hold.  reference toothbrush    OBJECTIVE DATA SUMMARY:   Cognitive/Behavioral Status:  Neurologic State: Alert  Orientation Level: Oriented X4  Cognition: Follows commands  Safety/Judgement: Decreased insight into deficits    Functional Mobility and Transfers for ADLs:  ADL Intervention:  Grooming  Washing Face: Stand-by assistance  Washing Hands: Stand-by assistance  Brushing Teeth: Stand-by assistance  Adaptive Equipment: Adaptive toothbrush    Upper Body [address]: Moderate assistance    Pain:  Pain level pre-treatment: 0/10   Pain level post-treatment: 0/10    Activity Tolerance:    Poor    Please refer to the flowsheet for vital signs taken during this treatment. After treatment:   ?  Patient left in no apparent distress sitting up in chair  ? Patient left in no apparent distress in bed  ? Call bell left within reach  ? Nursing notified  ?  family present  ? Bed alarm activated    COMMUNICATION/EDUCATION:   ? Role of Occupational Therapy in the acute care setting  ? Home safety education was provided and the patient/caregiver indicated understanding. ? Patient/family have participated as able in working towards goals and plan of care. ? Patient/family agree to work toward stated goals and plan of care. ? Patient understands intent and goals of therapy, but is neutral about his/her participation. ? Patient is unable to participate in goal setting and plan of care.       Thank you for this referral.  MAZIN Valadez  Time Calculation: 27 mins

## 2019-08-12 NOTE — ROUTINE PROCESS
Bedside and Verbal shift change report given to Cipriano Garcia (oncoming nurse) by Peter Maurer RN 
 (offgoing nurse). Report included the following information SBAR, Kardex and MAR.

## 2019-08-12 NOTE — PROGRESS NOTES
0700  -- Bedside, Verbal and Written shift change report given to [address] (oncoming nurse) by IKFWGWV [facility] Allergy band placed on pt's wrist. Report included the following information SBAR, Kardex, Intake/Output, MAR and Recent Results.       0900 -- Pt continues to refuses scheduled MRI. MD aware. Spouse found to be smoking in room. Mr. José Combs was educated     46 -- Pt educated on need for CT, pt continues to refuse.       1100 -- Bedside, Verbal and Written shift change report given to [facility]) by CARI (offgoing nurse). Report included the following information SBAR, Kardex, Intake/Output, MAR and Recent Results. Skin assessment completed. 1235 -- Contacted by GRISEL Velez, pt's spouse on floor. Unclear if pt was attempting to smoke again by hiding or pt fell. Pt refused all attempts to be assessed. I as CHARGE RN contacted daughter Consolidated Hakan [phone], she was updated on today's events. Daughter will be arriving to take pt home within the hour.

## 2019-08-12 NOTE — PROGRESS NOTES
Problem: Mobility Impaired (Adult and Pediatric)  Goal: *Acute Goals and Plan of Care (Insert Text)  Description  Physical Therapy Goals  Initiated 8/10/2019 and to be accomplished within 7 day(s)  1. Patient will move from supine to sit and sit to supine , scoot up and down and roll side to side in bed with minimal assistance/contact guard assist.     2.  Patient will transfer from bed to wheelchair and wheelchair to bed with moderate assistance using a transfer board. 3.  Patient/family will demonstrate independence with performance of home exercise program.  4.  Patient will demonstrate ability to self-propel in wheelchair x 50 feet with min Assistance   Outcome: Progressing Towards Goal   PHYSICAL THERAPY TREATMENT    Patient: Jac Rasheed (11 y.o. female)  Date: 8/12/2019  Diagnosis: CVA (cerebral vascular accident) ([facility] [address] [I63.9] CVA (cerebral vascular accident) ([facility]       Precautions: Fall  PLOF: WC at baseline    ASSESSMENT:  Pt required mod a X2 for bed mobility, fair seated balance, poor dynamic balance, attempted  preparation for transfers, max X2. Pt states she is able to stand for several minutes however questionable given 45 degree flexion contracture on R and 50 degrees on L. Pt required max X2 for positioning and scooting in bed, set up for breakfast, all needs in reach. Progression toward goals:   ?      Improving appropriately and progressing toward goals  ? Improving slowly and progressing toward goals  ? Not making progress toward goals and plan of care will be adjusted     PLAN:  Patient continues to benefit from skilled intervention to address the above impairments. Continue treatment per established plan of care. Discharge Recommendations:  Skilled Nursing Facility  Further Equipment Recommendations for Discharge:  TBD at next level of care      SUBJECTIVE:   Patient stated I can stand up for several minutes.     OBJECTIVE DATA SUMMARY:   Critical Behavior:  Neurologic State: Alert  Orientation Level: Oriented to person, Oriented to place  Cognition: Follows commands  Safety/Judgement: Decreased insight into deficits  Functional Mobility Training:  Bed Mobility:  Rolling: Moderate assistance  Supine to Sit: Moderate assistance  Sit to Supine: Total assistance;Assist x2  Scooting: Total assistance;Assist x2  Transfers:  Sit to Stand: Maximum assistance;Assist x2  Balance:  Sitting: Impaired  Sitting - Static: Fair (occasional)  Sitting - Dynamic: Poor (constant support)       Pain:  Pain level pre-treatment: 0/10  Pain level post-treatment: 0/10   Pain Intervention(s): n/a      Activity Tolerance:   Poor   Please refer to the flowsheet for vital signs taken during this treatment. After treatment:   ? Patient left in no apparent distress sitting up in chair  ? Patient left in no apparent distress in bed  ? Call bell left within reach  ? Nursing notified  ? Caregiver present  ? Bed alarm activated  ? SCDs applied      COMMUNICATION/EDUCATION:   ?           ? Fall prevention education was provided and the patient/caregiver indicated understanding. ? Patient/family have participated as able in working toward goals and plan of care. ?         Patient/family agree to work toward stated goals and plan of care. ?         Patient understands intent and goals of therapy, but is neutral about his/her participation. ? Patient is unable to participate in stated goals/plan of care: ongoing with therapy staff. ?         Role of Physical Therapy in the acute care setting.         Araceli Dickey PTA   Time Calculation: 16 mins

## 2019-08-12 NOTE — ROUTINE PROCESS
Bedside and Verbal shift change report given to Gloria Escalante RN 
 (oncoming nurse) by Sonny Salas RN (offgoing nurse). Report included the following information SBAR, Kardex and MAR.

## 2019-08-12 NOTE — CDMP QUERY
Pt admitted with Uti, possible Cva , Encephalopathy documented. After all tests are confirmed ,Please further specify type of Encephalopathy in the medical record ? Metabolic Encephalopathy sec to Uti Other Encephalopathy with diagnosis of Cva The medical record reflects the following: 
   Risk Factors:  Age, Uti,  
   Clinical Indicators:  
Neuro documents that Encephalopathy is likely from Uti rather than Cva , Treatment:  Cont abx Thank you, 
Kimberly Calles RN/CDI 
[phone]

## 2019-08-13 PROBLEM — N39.0 UTI (URINARY TRACT INFECTION): Status: ACTIVE | Noted: 2019-08-13

## 2019-08-13 LAB
ANION GAP SERPL CALC-SCNC: 8 MMOL/L (ref 3–18)
BUN SERPL-MCNC: 11 MG/DL (ref 7–18)
BUN/CREAT SERPL: 13 (ref 12–20)
CALCIUM SERPL-MCNC: 10.5 MG/DL (ref 8.5–10.1)
CHLORIDE SERPL-SCNC: 113 MMOL/L (ref 100–111)
CO2 SERPL-SCNC: 24 MMOL/L (ref 21–32)
CREAT SERPL-MCNC: 0.83 MG/DL (ref 0.6–1.3)
GLUCOSE SERPL-MCNC: 106 MG/DL (ref 74–99)
POTASSIUM SERPL-SCNC: 3 MMOL/L (ref 3.5–5.5)
SODIUM SERPL-SCNC: 145 MMOL/L (ref 136–145)
TSI ACT/NOR SER: <0.1 IU/L (ref 0–0.55)

## 2019-08-13 PROCEDURE — 92526 ORAL FUNCTION THERAPY: CPT

## 2019-08-13 PROCEDURE — 74011250636 HC RX REV CODE- 250/636: Performed by: HOSPITALIST

## 2019-08-13 PROCEDURE — 77030037877 HC DRSG MEPILEX >48IN BORD MOLN -A

## 2019-08-13 PROCEDURE — 77030011251 HC DRSG HYDRCOIL S&N -A

## 2019-08-13 PROCEDURE — 97530 THERAPEUTIC ACTIVITIES: CPT

## 2019-08-13 PROCEDURE — 36415 COLL VENOUS BLD VENIPUNCTURE: CPT

## 2019-08-13 PROCEDURE — 74011250636 HC RX REV CODE- 250/636: Performed by: NURSE PRACTITIONER

## 2019-08-13 PROCEDURE — 80048 BASIC METABOLIC PNL TOTAL CA: CPT

## 2019-08-13 PROCEDURE — 97168 OT RE-EVAL EST PLAN CARE: CPT

## 2019-08-13 PROCEDURE — 77030011256 HC DRSG MEPILEX <16IN NO BORD MOLN -A

## 2019-08-13 PROCEDURE — 74011000258 HC RX REV CODE- 258: Performed by: NURSE PRACTITIONER

## 2019-08-13 PROCEDURE — 74011250637 HC RX REV CODE- 250/637: Performed by: HOSPITALIST

## 2019-08-13 PROCEDURE — 65270000029 HC RM PRIVATE

## 2019-08-13 RX ORDER — POTASSIUM CHLORIDE 7.45 MG/ML
10 INJECTION INTRAVENOUS
Status: COMPLETED | OUTPATIENT
Start: 2019-08-13 | End: 2019-08-13

## 2019-08-13 RX ORDER — POTASSIUM CHLORIDE 20 MEQ/1
40 TABLET, EXTENDED RELEASE ORAL
Status: DISCONTINUED | OUTPATIENT
Start: 2019-08-13 | End: 2019-08-13

## 2019-08-13 RX ADMIN — POTASSIUM CHLORIDE 10 MEQ: 7.46 INJECTION, SOLUTION INTRAVENOUS at 12:50

## 2019-08-13 RX ADMIN — ATORVASTATIN CALCIUM 40 MG: 40 TABLET, FILM COATED ORAL at 23:01

## 2019-08-13 RX ADMIN — Medication 10 ML: at 05:35

## 2019-08-13 RX ADMIN — POTASSIUM CHLORIDE 10 MEQ: 7.46 INJECTION, SOLUTION INTRAVENOUS at 13:50

## 2019-08-13 RX ADMIN — Medication 10 ML: at 23:05

## 2019-08-13 RX ADMIN — POTASSIUM CHLORIDE 10 MEQ: 7.46 INJECTION, SOLUTION INTRAVENOUS at 16:20

## 2019-08-13 RX ADMIN — Medication 10 ML: at 14:00

## 2019-08-13 RX ADMIN — PIPERACILLIN SODIUM,TAZOBACTAM SODIUM 3.38 G: 3; .375 INJECTION, POWDER, FOR SOLUTION INTRAVENOUS at 11:20

## 2019-08-13 RX ADMIN — ENOXAPARIN SODIUM 40 MG: 40 INJECTION SUBCUTANEOUS at 15:29

## 2019-08-13 RX ADMIN — PIPERACILLIN SODIUM,TAZOBACTAM SODIUM 3.38 G: 3; .375 INJECTION, POWDER, FOR SOLUTION INTRAVENOUS at 04:01

## 2019-08-13 RX ADMIN — SODIUM CHLORIDE 750 MG: 900 INJECTION, SOLUTION INTRAVENOUS at 01:34

## 2019-08-13 RX ADMIN — POTASSIUM CHLORIDE 10 MEQ: 7.46 INJECTION, SOLUTION INTRAVENOUS at 11:20

## 2019-08-13 RX ADMIN — POTASSIUM CHLORIDE 10 MEQ: 7.46 INJECTION, SOLUTION INTRAVENOUS at 15:16

## 2019-08-13 NOTE — PROGRESS NOTES
Smell of smoke in hallway. Found patient's  with cigarette ashes on floor at feet.  initially denies smoking, then admits \"yes it was me\"   escorted to the family room, advised that no smoking is allowed due to danger of interaction with oxygen.  states \"ok, I know.  I'm sorry\"

## 2019-08-13 NOTE — PROGRESS NOTES
Problem: Self Care Deficits Care Plan (Adult)  Goal: *Acute Goals and Plan of Care (Insert Text)  Description  Occupational Therapy Goals  Initiated 8/10/2019 within 7 day(s). Re-evaluated on 8/13/19 to update functional goals at appropriate. UPDATED GOALS Chaucindy Veliz, MS OTR/L)  1. Patient will perform grooming tasks at EOB with minimal assistance for bilateral coordination and supervision for sitting balance. 2.  Patient will perform lower body dressing with minimal assistance utilizing adaptive strategies and minimal assistance for dynamic sitting balance. 3.  Patient will perform functional task at EOB for 8 minutes with fair dynamic sitting balance and < 4 rest breaks to increase tolerance for ADLs. 4.  Patient will perform toilet transfers with maximum assistance utilizing AD, prn.  5.  Patient will perform all aspects of toileting with maximum assistance. 6.  Patient will participate in upper extremity therapeutic exercise/activities with minimal assistance for 8 minutes to increase BUE strength for functional transfers and ADLs. 1.  Patient will perform rolling in bed with minimal assistance/contact guard assist to assist with pericare  2. Patient will perform static sitting EOB with minimal assistance to prepare for grooming activities. 3.  Patient will perform toilet transfer with moderate assistance . 4. Patient will participate in upper extremity therapeutic exercise/activities with independence for 7 minutes to increase endurance for functional activities. 7.  Patient will utilize energy conservation techniques during functional activities with verbal/tactile cues as needed.       Outcome: Progressing Towards Goal       OCCUPATIONAL THERAPY RE-EVALUATION    Patient: Dinora Plaza (76 y.o. female)  Date: 8/13/2019  Primary Diagnosis: CVA (cerebral vascular accident) [facility]) [I63.9]        Precautions: Fall, Skin  PLOF: Pt was primarily bed-bound and required assistance with all ADLs.    ASSESSMENT :  Based on the objective data described below, the patient presents with impairments with regard to bed mobility, functional transfers and independence in ADLs secondary to CVA and baseline function (assistance with all ADLs & primarily bedbound). Pt re-evaluated to updated functional goals as appropriate. Pt supine on arrival, c/o 9/10 L wrist pain, hotpack on skin. Skin assessed for safety; no redness noted. Pt's spouse & sister at bedside. Both spouse & pt having difficulty providing accurate PLOF with regard to mobility & ADLs. Max A x2 for supine-->sit. Fair- sitting balance requiring CGA/min A For balance. Pt unable to perform forward flexion in prep for LB dressing; BLEs significantly contracted. Max A for BUE positioning to promote self-supported sitting in prep for self-feeding. Pt reporting fatigue; repositioned supine with max A. HOB elevated, max A to set-up lunch tray due to decreased BUE strength & coordination. Needs within reach & family members at bedside. Recommend continued therapy. Patient will benefit from skilled intervention to address the above impairments. Patient's rehabilitation potential is considered to be Fair  Factors which may influence rehabilitation potential include:   ? None noted  ? Mental ability/status  ? Medical condition  ? Home/family situation and support systems  ? Safety awareness  ? Pain tolerance/management  ? Other:      PLAN :  Recommendations and Planned Interventions:   ?               Self Care Training                  ? Therapeutic Activities  ? Functional Mobility Training   ? Cognitive Retraining  ? Therapeutic Exercises           ? Endurance Activities  ? Balance Training                    ? Neuromuscular Re-Education  ? Visual/Perceptual Training     ?       Home Safety Training  ? Patient Education                   ? Family Training/Education  ? Other (comment):    Frequency/Duration: Patient will be followed by occupational therapy 3-5 to address goals. Discharge Recommendations: LTC  Further Equipment Recommendations for Discharge: hospital bed     SUBJECTIVE:   Patient stated My left wrist hurts.     OBJECTIVE DATA SUMMARY:   Hospital course since last seen and reason for reevaluation: Pt re-evaluated to update goals as appropriate to pt's functional status. Past Medical History:   Diagnosis Date    Rheumatoid arthritis, adult ([facility]     Tachycardia      Past Surgical History:   Procedure Laterality Date    HX CAROTID ENDARTERECTOMY      left    HX CATARACT REMOVAL      bilateral    HX HIP REPLACEMENT      left     Barriers to Learning/Limitations: yes;  cognitive  Compensate with: visual, verbal, tactile, kinesthetic cues/model    Home Situation:   Home Situation  Home Environment: Private residence  # Steps to Enter: 0  One/Two Story Residence: Two story  Living Alone: No  Support Systems: Family member(s)  Patient Expects to be Discharged to[de-identified] Private residence  Current DME Used/Available at Home: Wheelchair  ? Right hand dominant   ? Left hand dominant    Cognitive/Behavioral Status:  Neurologic State: Alert  Orientation Level: Oriented to person;Oriented to place;Oriented to time  Cognition: Follows commands  Safety/Judgement: Decreased insight into deficits    Skin: Intact (BUEs)  Edema: None noted (BUEs)    Vision/Perceptual:   Acuity: Within Defined Limits      Coordination: BUE  Fine Motor Skills-Upper: Right Intact; Left Intact    Gross Motor Skills-Upper: Right Intact; Left Intact    Balance:  Sitting: Impaired  Sitting - Static: Fair (occasional)  Sitting - Dynamic: Poor (constant support)  Standing: (not assessed due to contracted BLEs)    Strength: BUE  Strength: Generally decreased, functional    Tone & Sensation: BUE  Tone: Normal    Range of Motion: BUE  AROM: Generally decreased, functional  PROM: Within functional limits    Functional Mobility and Transfers for ADLs:  Bed Mobility:  Supine to Sit: Maximum assistance;Assist x2  Sit to Supine: Maximum assistance;Assist x2  Scooting: Maximum assistance;Assist x2    Transfers:  Sit to Stand: (not assessed due to contracted BLEs)    ADL Assessment:   Feeding: Minimum assistance  Oral Facial Hygiene/Grooming: Maximum assistance  Bathing: Maximum assistance  Upper Body Dressing: Maximum assistance  Lower Body Dressing: Maximum assistance  Toileting: Maximum assistance    Cognitive Retraining  Safety/Judgement: Decreased insight into deficits    Therapeutic Activity:  Max A x2 for bed mobility into sitting position with fair- sitting balance in prep for LB dressing & self-feeding task. BUE positioning with max A to promote self-supported sitting in prep for functional tasks. Repositioning with max A at bed level in prep for self-feeding. Pain:  Pain level pre-treatment: 9/10   Pain level post-treatment: 9/10 (L wrist)  Pain Intervention(s): Medication (see MAR); Rest, Heat, Repositioning    Activity Tolerance: Fair  Please refer to the flowsheet for vital signs taken during this treatment. After treatment:   ? Patient left in no apparent distress sitting up in chair  ? Patient left in no apparent distress in bed  ? Call bell left within reach  ? Nursing notified  ? Caregiver present  ? Bed alarm activated    COMMUNICATION/EDUCATION:   ? Role of Occupational Therapy in the acute care setting  ? Home safety education was provided and the patient/caregiver indicated understanding. ? Patient/family have participated as able in goal setting and plan of care. ? Patient/family agree to work toward stated goals and plan of care. ? Patient understands intent and goals of therapy, but is neutral about his/her participation. ?  Patient is unable to participate in goal setting and plan of care.    Thank you for this referral.  Sesar Kulkarni MS OTR/L  Time Calculation: 21 mins

## 2019-08-13 NOTE — PROGRESS NOTES
Problem: Dysphagia (Adult)  Goal: *Acute Goals and Plan of Care (Insert Text)  Description  Patient will:  1. Tolerate PO trials with 0 s/s overt distress in 4/5 trials  - goal met  2. Utilize compensatory swallow strategies/maneuvers (decrease bite/sip, size/rate, alt. liq/sol) with min cues in 4/5 trials - goal met    Recommend:   Regular diet with thin liquids  Meds as tolerated  Aspiration precautions  HOB >45 degrees during all intake and for at least 30 min after po   Small bites/sips, Slow rate of intake        Outcome: Resolved/Met     SPEECH LANGUAGE PATHOLOGY DYSPHAGIA TREATMENT & DISCHARGE    Patient: Kamala Harrison (89 y.o. female)  Date: 8/13/2019  Diagnosis: CVA (cerebral vascular accident) ([facility] [I63.9] CVA (cerebral vascular accident) ([facility]       Precautions: aspiration Fall  PLOF: regular/thin    ASSESSMENT:  Follow up this am with observation of pt feeding self regular solid/thin liquid breakfast. Pt continues to exhibit mildly delayed mastication of solids with otherwise adequate bolus cohesion, manipulation and A-P transit. Further exhibited adequate swallow timing/reflex and hyolaryngeal excursion. Able to manipulate and clear with 0 clinical s/s aspiration. Pt independently utilized slow feeding rate, small sips/bites. Maximum therapeutic gains met; safest, least restrictive diet achieved in current in-patient/acute setting. Accordingly, SLP to d/c intervention at this time. Progression toward goals:  ?         Improving appropriately - goals met/approximated  ? Not making progress/Not appropriate - will d/c POC     PLAN:  Recommendations and Planned Interventions:  Maximum therapeutic gains met; safest, least restrictive diet achieved. D/C ST intervention at this time. Discharge Recommendations:  Skilled Nursing Facility     SUBJECTIVE:   Patient stated Jignesh Shen, thank you.     OBJECTIVE:   Cognitive and Communication Status:  Neurologic State: Appropriate for age, Alert  Orientation Level: Oriented to person, Oriented to place, Oriented to time, Disoriented to situation  Cognition: Appropriate for age attention/concentration, Follows commands  Perception: Appears intact  Perseveration: No perseveration noted  Safety/Judgement: Decreased insight into deficits  Dysphagia Treatment:  Oral Assessment:  Oral Assessment  Labial: Right droop  Dentition: Natural, Intact  Oral Hygiene: Good  Lingual: No impairment  Velum: No impairment  Mandible: No impairment  P.O. Trials:   Patient Position: [facility] at [facility]   Vocal quality prior to P.O.: Hoarse   Consistency Presented: Thin liquid, Solid   How Presented: Self-fed/presented, Cup/sip, Straw, Successive swallows, Spoon       Bolus Acceptance: No impairment   Bolus Formation/Control: No impairment       Propulsion: No impairment   Oral Residue: None   Initiation of Swallow: No impairment   Laryngeal Elevation: Functional   Aspiration Signs/Symptoms: Delayed cough/throat clear(x1)   Pharyngeal Phase Characteristics: No impairment, issues, or problems    Effective Modifications: Small sips and bites             Oral Phase Severity: No impairment   Pharyngeal Phase Severity : Mild           PAIN:  Pain level pre-treatment: 0/10   Pain level post-treatment: 0/10     After treatment:   ?              Patient left in no apparent distress sitting up in chair  ? Patient left in no apparent distress in bed  ? Call bell left within reach  ? Nursing notified  ? Caregiver present  ? Bed alarm activated      COMMUNICATION/EDUCATION:   ? Aspiration precautions; swallow safety; compensatory techniques  ? Patient unable to participate in education; education ongoing with staff  ? Posted safety precautions in patient's room.   ? Oral-motor/laryngeal strengthening exercises    Thank you for this referral,  Stephy Sánchez SLP  Time Calculation: 15 mins

## 2019-08-13 NOTE — PROGRESS NOTES
Problem: Pressure Injury - Risk of  Goal: *Prevention of pressure injury  Description  Document Bassem Scale and appropriate interventions in the flowsheet.   Outcome: Progressing Towards Goal  Note:   Pressure Injury Interventions:  Sensory Interventions: Assess changes in LOC, Keep linens dry and wrinkle-free, Monitor skin under medical devices    Moisture Interventions: Absorbent underpads, Apply protective barrier, creams and emollients    Activity Interventions: Pressure redistribution bed/mattress(bed type)    Mobility Interventions: HOB 30 degrees or less, Pressure redistribution bed/mattress (bed type)    Nutrition Interventions: Offer support with meals,snacks and hydration    Friction and Shear Interventions: HOB 30 degrees or less, Foam dressings/transparent film/skin sealants                Problem: TIA/CVA Stroke: Day 2 Until Discharge  Goal: *Stroke education continued(Stroke Metric)  Outcome: Progressing Towards Goal     Problem: TIA/CVA Stroke: Day 2 Until Discharge  Goal: Treatments/Interventions/Procedures  Outcome: Progressing Towards Goal     Problem: TIA/CVA Stroke: Day 2 Until Discharge  Goal: Psychosocial  Outcome: Progressing Towards Goal     Problem: TIA/CVA Stroke: Day 2 Until Discharge  Goal: Activity/Safety  Outcome: Progressing Towards Goal

## 2019-08-13 NOTE — PROGRESS NOTES
Teleneurology Consult    Patient ID  Name:  Helen Maurer  :  1940  MRN:  [id number]  Age:  70 y.o. PCP:  Sula Apgar, MD    Subjective:     Encounter Date:  2019    Referring Physician: No ref. provider found    Chief Complaint   Patient presents with    Dental Pain    Lethargy       History of Present Illness:   Helen Maurer is a 70 y.o. seen on telemedicine followup utilizing a telepresenter. Lethargic again today and does not want to be examined.      Current Facility-Administered Medications   Medication Dose Route Frequency Provider Last Rate Last Dose    potassium chloride (K-DUR, KLOR-CON) SR tablet 40 mEq  40 mEq Oral Q2H Claudio Ley MD        clopidogrel (PLAVIX) tablet 75 mg  75 mg Oral DAILY Jennifer Gaffney DO   75 mg at 19 1117    atorvastatin (LIPITOR) tablet 40 mg  40 mg Oral QHS Triston Lainez MD   40 mg at 19 2208    vancomycin (VANCOCIN) 750 mg in 0.9% sodium chloride 250 mL IVPB  750 mg IntraVENous Q18H Jaime Ortega  mL/hr at 19 0134 750 mg at 19 0134    VANCOMYCIN INFORMATION NOTE   Other ONCE Jaime Ortega MD        [START ON 2019] VANCOMYCIN INFORMATION NOTE   Other ONCE Austin Narayan., NP        VANCOMYCIN INFORMATION NOTE   Other Rx Dosing/Monitoring Austin Narayan., NP        piperacillin-tazobactam (ZOSYN) 3.375 g in 0.9% sodium chloride (MBP/ADV) 100 mL MBP  3.375 g IntraVENous Q8H Austin Narayan., NP 25 mL/hr at 19 0401 3.375 g at 19 0401    sodium chloride (NS) flush 5-40 mL  5-40 mL IntraVENous Q8H Jaime Ortega MD   10 mL at 19 0535    sodium chloride (NS) flush 5-40 mL  5-40 mL IntraVENous PRN Jaime Ortega MD        enoxaparin (LOVENOX) injection 40 mg  40 mg SubCUTAneous Q24H Jaime Ortega MD   40 mg at 19 1300    sodium chloride (NS) flush 5-10 mL  5-10 mL IntraVENous PRN Jaime Ortega MD        levoFLOXacin (LEVAQUIN) 750 mg in D5W IVPB  750 mg IntraVENous Q48H Vinh Crowder  mL/hr at 08/11/19 2035 750 mg at 08/11/19 2035     Allergies   Allergen Reactions    Aspirin Unknown (comments)    Codeine Unknown (comments)     Patient Active Problem List   Diagnosis Code    [facility] fracture ([facility] S72.009A    CVA (cerebral vascular accident) ([facility] I63.9    Underweight R63.6     Past Medical History:   Diagnosis Date    Rheumatoid arthritis, adult ([facility]     Tachycardia       Past Surgical History:   Procedure Laterality Date    HX CAROTID ENDARTERECTOMY      left    HX CATARACT REMOVAL      bilateral    HX HIP REPLACEMENT      left      History reviewed. No pertinent family history. Social History     Socioeconomic History    Marital status:      Spouse name: Not on file    Number of children: Not on file    Years of education: Not on file    Highest education level: Not on file   Tobacco Use    Smoking status: Current Every Day Smoker     Packs/day: 1.50    Smokeless tobacco: Never Used   Substance and Sexual Activity    Alcohol use: No    Drug use: No       Review of Systems:  Pertinent items are noted in HPI. Objective:     Vitals:    08/12/19 2348 08/13/19 0204 08/13/19 0446 08/13/19 0730   BP: 126/45  116/57 109/89   Pulse: 80  73 80   Resp: 16  16 18   Temp: 97.8 °F (36.6 °C)  98.2 °F (36.8 °C) 98.4 °F (36.9 °C)   SpO2: 91%  95% 96%   Weight:  34.7 kg (76 lb 9.6 oz)     Height:           Physical Exam:    General:  Patient seen via telemedicine video encounter utilizing a tele-presenter. No acute distress. Neck: Supple, nontender, thyroid within normal limits, no JVD, no bruits, no pain with resistance to active range of motion per telepresenter  Heart: Regular rate and rhythm, no murmurs, rub, or gallop.   Normal S1S2 per telepresenter  Lungs:  Clear to auscultation bilaterally with equal chest expansion, no cough, no wheeze per telepresenter  Musculoskeletal: No obvious impairment on gross testing    NEUROLOGICAL EXAMINATION:     Mental Status:  Sleeping, lethargic but nods head,   Cranial Nerves:    II, III, IV, VI:  Visual fields are normal to confrontation. Pupils are equal, round, and reactive to light and accommodation. Extra-ocular movements are full and fluid. No ptosis or nystagmus. V-XII:   Face is better but right side still asymmetric  Normal sensation. The palate rises symmetrically and the tongue protrudes midline. Sternocleidomastoids 5/5. Motor Examination: Strength 5/5 on left, 4/5 RUE  No involuntary movements, Normal tone    Sensory exam:  Normal throughout to light touch per telepresenter  Coordination:  No dysmetria, No resting or intention tremor    Gait and Station:  Not tested  Reflexes:  Not tested by telepresenter  Labs  Recent Results (from the past 24 hour(s))   VITAMIN B12    Collection Time: 08/12/19 10:25 AM   Result Value Ref Range    Vitamin B12 531 211 - 911 pg/mL   T4, FREE    Collection Time: 08/12/19 10:25 AM   Result Value Ref Range    T4, Free 1.2 0.7 - 1.5 NG/DL   METABOLIC PANEL, BASIC    Collection Time: 08/13/19  4:45 AM   Result Value Ref Range    Sodium 145 136 - 145 mmol/L    Potassium 3.0 (L) 3.5 - 5.5 mmol/L    Chloride 113 (H) 100 - 111 mmol/L    CO2 24 21 - 32 mmol/L    Anion gap 8 3.0 - 18 mmol/L    Glucose 106 (H) 74 - 99 mg/dL    BUN 11 7.0 - 18 MG/DL    Creatinine 0.83 0.6 - 1.3 MG/DL    BUN/Creatinine ratio 13 12 - 20      GFR est AA >60 >60 ml/min/1.73m2    GFR est non-AA >60 >60 ml/min/1.73m2    Calcium 10.5 (H) 8.5 - 10.1 MG/DL        Relevant Radiology:   Mra Brain Wo Cont    Result Date: 8/12/2019  MR angiogram of the [place] without contrast . CLINICAL INDICATION/HISTORY:  Facial droop, CVA. COMPARISON:  Correlation brain MRI same day. TECHNIQUE: 3-D time-of-flight imaging of the intracranial vasculature was performed. MIP reconstructions were obtained from the source images.   Imaging performed on wide bore Discovery [id number] [place] suite 3T magnet at [facility]. _______________ FINDINGS: There is tapering flow signal within the left V3 and proximal V4 segment of the vertebral artery with flow seen within left PICA but no flow signal seen within the distal intradural segment. Mild atherosclerotic changes but no high-grade stenosis right intradural vertebral artery with normal basilar artery. Mild atherosclerotic changes distal ICAs without high-grade stenosis. Mild to moderate stenosis distal left P2 segment posterior cerebral artery. Mild likely atherosclerotic stenosis right M1 segment middle cerebral artery. Patent anterior communicating artery. There is no evidence of aneurysm formation or underlying vascular malformation. _______________     IMPRESSION:  1. Tapering flow signal left intradural vertebral artery, suggestive of slow flow, which may be related to developmentally small vessel although possibility of proximal vertebral artery stenosis should be considered. 2. Mild to moderate left posterior cerebral artery stenosis, additional mild likely atherosclerotic irregularity right middle cerebral and right vertebral arteries. Mri Brain Wo Cont    Result Date: 8/12/2019  EXAM: MRI brain without gadolinium CLINICAL INDICATION/HISTORY: cva   > Additional: Left-sided facial droop COMPARISON: 8/16/2010   > Reference Exam: CT head 8/9/2019 TECHNIQUE: SagittalFLAIR T1, axial T1, T2, FLAIR, susceptibility weighted, and diffusion sequences obtained of the brain. Patient could not tolerate further imaging for coronal sequences. Multiple repeat sequences obtained due to motion. _______________ FINDINGS: Diffusion:  There are no areas of restricted diffusion, no evidence of an acute infarction. Brain parenchyma:  Interval evolution of chronic lacunar infarct head of right caudate nucleus.  Persistent moderate confluent and patchy increased T2 and FLAIR signal periventricular and deep cerebral white matter with stable small band of FLAIR hyperintensity anterior lateral margin right basal ganglia. No new cortical signal abnormality. No new mass hemorrhage or mass effect. Ventricles and sulci:  Interval progression of mild to moderate diffuse ventriculomegaly and diffuse cortical volume loss. Extra-axial:  No extra-axial fluid collection or mass is noted. Brain vasculature:  No vascular abnormality is appreciated on this routine brain MR examination. Craniocervical junction:  Normal. Skull base, extracranial and calvarium:  Previous bilateral lens implants. Very mild maxillary and ethmoid sinus mucosal thickening. IACs and mastoids unremarkable. Partial empty sella. No new skull abnormality. _______________     IMPRESSION: 1. No evidence of acute infarct or other new acute intracranial finding. 2. Chronic right basal ganglia infarct and persistent moderate bilateral cerebral white matter and right basal ganglia signal changes likely chronic microvascular ischemic disease. 3. Interval progression of mild to moderate diffuse volume loss. Impression:     Right sided weakness, and confusion with a ddx of MR negative stroke in setting of Tapering flow signal left intradural vertebral artery, suggestive of slow flow, which may be related to developmentally small vessel although possibility of proximal vertebral artery stenosis should be considered &  Mild to moderate left posterior cerebral artery stenosis, additional mild likely atherosclerotic irregularity right middle cerebral and right vertebral arteries.  Vs. Exacerbation of old CVA  Suspected early dementia (T4 ok, TSH pending, B12 531)  Plan:     Workup completed  Disposition to SNF noted  Would discharge on plavix 75 mg daily and statin      Signed By:  Diya Price DO     8/13/2019    [facility] for [facility]

## 2019-08-13 NOTE — PROGRESS NOTES
Patient has an accepting bed at [facility] starting [facility] today    [address] [place] [address] reference # [id number] clinicals faxed to [phone]

## 2019-08-13 NOTE — PROGRESS NOTES
Assume care of patient lying in bed awake and listening to TV. Alert to person, place time. Denies pain or discomfort at present. Bed locked in lowest position. Call light within reach and understand to use for assistance and needs. Instructed the usage of call light. Dual NIH with GRISEL Robertson with patient cooperative and able to participate. 08/13/2019    0730 Bedside and Verbal shift change report given to [place] (oncoming nurse) by Maria Ines Aguilar RN (offgoing nurse). Report given with SBAR, Kardex, Intake/Output, MAR and Recent Results.

## 2019-08-13 NOTE — PROGRESS NOTES
Patient and/or next of kin has been given the [facility] Important Message From Medicare About Your Rights\" letter and all questions were answered. Copy given to pt, copy to chart. Tennille Combs.  SUZE Pink  [facility]  [phone], Pager [phone]  [email or web address]

## 2019-08-13 NOTE — PROGRESS NOTES
Received bedside report from [facility]. Pt Aox4, NIH Q4hrs, SR on monitor, RA, pt resting in bed/bed in low position, wheels locked, call bell within reach. 1124-Potassium 1of 5 runs started. 1340-Pt turned and repositioned in bed. Pt denies pain or discomfort at this time. 1630-Pt had am incontinence episode of stool cleaned and wiped down/gown changed/bed pad changed. 1900-Bedside and Verbal shift change report given to [facility] (oncoming nurse) by Saint Martin RN (offgoing nurse).  Report included the following information SBAR, Kardex, [facility] and Cardiac Rhythm SR.

## 2019-08-13 NOTE — PROGRESS NOTES
Assessment Tracking Number: [id number] Status: Successfully Processed    Assessment Date: 07/29/2019    Leonel Osler Information:   Screener's Name: [facility] DEPT. PUBLIC HEALTH  NPI: [id number] Provider Name: Donald Ramirez Information:  Screener's Name: [facility] DEPT.   NPI: [id number] Provider Name: Nakia Saunders [place] [address] Dept to request a copy of [facility]AI. Spoke to Jin Santos who will fax to Care-management office now. 1230--In receipt of [facility] from [facility]. Copy given to [address]  RN    Outcomes Manager  [phone]  [phone]

## 2019-08-14 LAB — POTASSIUM SERPL-SCNC: 3.3 MMOL/L (ref 3.5–5.5)

## 2019-08-14 PROCEDURE — 36415 COLL VENOUS BLD VENIPUNCTURE: CPT

## 2019-08-14 PROCEDURE — 77030040361 HC SLV COMPR DVT MDII -B

## 2019-08-14 PROCEDURE — 74011250637 HC RX REV CODE- 250/637: Performed by: HOSPITALIST

## 2019-08-14 PROCEDURE — 97110 THERAPEUTIC EXERCISES: CPT

## 2019-08-14 PROCEDURE — 84132 ASSAY OF SERUM POTASSIUM: CPT

## 2019-08-14 PROCEDURE — 97530 THERAPEUTIC ACTIVITIES: CPT

## 2019-08-14 PROCEDURE — 74011250636 HC RX REV CODE- 250/636: Performed by: HOSPITALIST

## 2019-08-14 PROCEDURE — 65270000029 HC RM PRIVATE

## 2019-08-14 PROCEDURE — 97535 SELF CARE MNGMENT TRAINING: CPT

## 2019-08-14 PROCEDURE — 74011250637 HC RX REV CODE- 250/637: Performed by: SPECIALIST

## 2019-08-14 RX ORDER — POTASSIUM CHLORIDE 20 MEQ/1
40 TABLET, EXTENDED RELEASE ORAL
Status: COMPLETED | OUTPATIENT
Start: 2019-08-14 | End: 2019-08-14

## 2019-08-14 RX ADMIN — CLOPIDOGREL BISULFATE 75 MG: 75 TABLET ORAL at 08:29

## 2019-08-14 RX ADMIN — ENOXAPARIN SODIUM 40 MG: 40 INJECTION SUBCUTANEOUS at 12:27

## 2019-08-14 RX ADMIN — Medication 10 ML: at 13:00

## 2019-08-14 RX ADMIN — POTASSIUM CHLORIDE 40 MEQ: 20 TABLET, EXTENDED RELEASE ORAL at 16:08

## 2019-08-14 RX ADMIN — ATORVASTATIN CALCIUM 40 MG: 40 TABLET, FILM COATED ORAL at 22:39

## 2019-08-14 RX ADMIN — Medication 10 ML: at 22:42

## 2019-08-14 RX ADMIN — Medication 10 ML: at 06:00

## 2019-08-14 NOTE — ROUTINE PROCESS
0710- Bedside shift change report given to Nkechi Casey RN 
 (oncoming nurse) by  Jacques suarez). Report included the following information SBAR.

## 2019-08-14 NOTE — PROGRESS NOTES
Progress Note      Patient: Crystal Benítez               Sex: female          DOA: 8/9/2019       YOB: 1940      Age:  70 y.o.        LOS:  LOS: 5 days             CHIEF COMPLAINT:  Weakness and encephalopathy improving    Subjective:     Patient seen and examined at bedside. Has no complaints today. Is looking forward to going to [facility]. Objective:      Visit Vitals  /61   Pulse 86   Temp 97.9 °F (36.6 °C)   Resp 18   Ht 4' 10\" (1.473 m)   Wt 34.6 kg (76 lb 3.2 oz)   SpO2 95%   BMI 15.93 kg/m²       Physical Exam:  Gen:  No distress, no complaint  Lungs:  Clear bilaterally, no wheeze or rhonchi  Heart:  Regular rate and rhythm, no murmurs or gallops  Abdomen:  Soft, non-tender, normal bowel sounds  Neuro: motor/sensory intact, still with R facial droop      Lab/Data Reviewed:  BMP:   Lab Results   Component Value Date/Time    K 3.3 (L) 08/14/2019 11:25 AM         Assessment/Plan     Principal Problem:    CVA (cerebral vascular accident) ([facility] [address] (8/9/2019)    Active Problems:    Underweight (8/12/2019)      UTI (urinary tract infection) (8/13/2019)        Plan:  MRI neg for stroke, neuro recommending to d/c on plavix and statin. Finished abx for UTI  Continue with PT  PT/OT rec SNF. Auth underway.    DVT ppx

## 2019-08-14 NOTE — PROGRESS NOTES
conducted a Follow up consultation and Spiritual Assessment for Jared Rueda, who is a 70 y.o.,female. The  provided the following Interventions:  Continued the relationship of care and support. Listened empathically. Offered prayer and assurance of continued prayer on patients behalf. Chart reviewed. The following outcomes were achieved:  Patient expressed gratitude for pastoral care visit. Assessment:  There are no further spiritual or Taoist issues which require Spiritual Care Services interventions at this time. Plan:  Chaplains will continue to follow and will provide pastoral care on an as needed/requested basis.  recommends bedside caregivers page  on duty if patient shows signs of acute spiritual or emotional distress.      [address] [facility]   Staff [address] [facility]   [phone]

## 2019-08-14 NOTE — PROGRESS NOTES
Problem: Self Care Deficits Care Plan (Adult)  Goal: *Acute Goals and Plan of Care (Insert Text)  Description  Occupational Therapy Goals  Initiated 8/10/2019 within 7 day(s). Re-evaluated on 8/13/19 to update functional goals at appropriate. UPDATED GOALS Estrellita Oliver, MS OTR/L)  1. Patient will perform grooming tasks at EOB with minimal assistance for bilateral coordination and supervision for sitting balance. 2.  Patient will perform lower body dressing with minimal assistance utilizing adaptive strategies and minimal assistance for dynamic sitting balance. 3.  Patient will perform functional task at EOB for 8 minutes with fair dynamic sitting balance and < 4 rest breaks to increase tolerance for ADLs. 4.  Patient will perform toilet transfers with maximum assistance utilizing AD, prn.  5.  Patient will perform all aspects of toileting with maximum assistance. 6.  Patient will participate in upper extremity therapeutic exercise/activities with minimal assistance for 8 minutes to increase BUE strength for functional transfers and ADLs. 1.  Patient will perform rolling in bed with minimal assistance/contact guard assist to assist with pericare  2. Patient will perform static sitting EOB with minimal assistance to prepare for grooming activities. 3.  Patient will perform toilet transfer with moderate assistance . 4. Patient will participate in upper extremity therapeutic exercise/activities with independence for 7 minutes to increase endurance for functional activities. 7.  Patient will utilize energy conservation techniques during functional activities with verbal/tactile cues as needed.       Outcome: Progressing Towards Goal   OCCUPATIONAL THERAPY TREATMENT    Patient: Crystal Benítez (16 y.o. female)  Date: 8/14/2019  Diagnosis: CVA (cerebral vascular accident) [facility]) [I63.9] CVA (cerebral vascular accident) [facility])       Precautions: Fall, Skin  PLOF: Assist w/BADLs    Chart, occupational therapy assessment, plan of care, and goals were reviewed. ASSESSMENT:  Pt is pleasant and cooperative, agreeable to EOB activity. Supportive family at bedside. Pt tolerates ~ 20 minutes sitting EOB performing ADL grooming tasks (see functional levels below) and UE/cervical TherEx for improved posture/balance. Decrease sitting balance requires max verbal/tactile cues to maintain midline 2/2 L lateral lean. Decrease ROM RUE w/ADL grooming tasks require assist for thoroughness. Pt educated on importance of UE TherEx and encouraged to perform throughout the day. Pt motivated for d/c home to resume smoking. Educated on importance of smoking cessation and alerted NSG, Alyssa Milton, possible nicotine patch. Progression toward goals:  ?          Improving appropriately and progressing toward goals  ? Improving slowly and progressing toward goals  ? Not making progress toward goals and plan of care will be adjusted     PLAN:  Patient continues to benefit from skilled intervention to address the above impairments. Continue treatment per established plan of care. Discharge Recommendations:  Skilled Nursing Facility/LTC  Further Equipment Recommendations for Discharge:  hospital bed     SUBJECTIVE:   Patient stated I just want to be able to go outside and smoke a cigarette.     OBJECTIVE DATA SUMMARY:   Cognitive/Behavioral Status:  Neurologic State: Alert  Orientation Level: Disoriented to situation  Cognition: Follows commands  Safety/Judgement: Decreased insight into deficits    Functional Mobility and Transfers for ADLs:   Bed Mobility:  Supine to Sit: Moderate assistance  Sit to Supine: Minimum assistance  Balance:  Sitting: Impaired  Sitting - Static: Fair (occasional)(fair/fair minus)  Sitting - Dynamic: Fair (occasional)(fair/fair minus)    ADL Intervention:  Grooming  Washing Face: Stand-by assistance  Washing Hands: Stand-by assistance  Brushing/Combing Hair: Moderate assistance    Neuro Re-Education:  Pt requires max verbal/tactile cues to maintain midline at EOB 2/2 L lateral lean. UE Therapeutic Exercises:   AAROM RUE shoulder flexion w/prolonged stretch 5x at bed level  AAROM (B) scapula retraction/protraction, seated EOB  AROM cervical extension, seated EOB    Pain:  Pain level pre-treatment: 0/10   Pain level post-treatment: 0/10    Activity Tolerance:    Fair    Please refer to the flowsheet for vital signs taken during this treatment. After treatment:   ?  Patient left in no apparent distress sitting up in chair  ? Patient left in no apparent distress in bed  ? Call bell left within reach  ? Nursing notified  ? Family present  ? Bed alarm activated    COMMUNICATION/EDUCATION:   ? Role of Occupational Therapy in the acute care setting  ? Home safety education was provided and the patient/caregiver indicated understanding. ? Patient/family have participated as able in working towards goals and plan of care. ? Patient/family agree to work toward stated goals and plan of care. ? Patient understands intent and goals of therapy, but is neutral about his/her participation. ? Patient is unable to participate in goal setting and plan of care.       Thank you for this referral.  MAZIN Perry  Time Calculation: 36 mins

## 2019-08-14 NOTE — PROGRESS NOTES
1100: Report received from gomez MORENO Pt is working with PT at the moment. Pt has no complaints, denies any pain. On room air, vss, will continue to monitor    1300: Pt unable to eat hard foods. Changed diet to dental soft. Call dietary to have them bring her cottage cheese and jello to eat now. 1400: pt incontinent of bowel and urine, incontinence care performed. Tolerated well.     1800: Pt sitting upright eating dinner with assistance from family. Tolerated well.     1900: Bedside and Verbal shift change report given to Annalise Roberts rn (oncoming nurse) by Wendy Roman   (offgoing nurse). Report included the following information SBAR, Kardex, ED Summary, Intake/Output, MAR and Cardiac Rhythm nsr. Dual NIH performed.

## 2019-08-14 NOTE — PROGRESS NOTES
Problem: Mobility Impaired (Adult and Pediatric)  Goal: *Acute Goals and Plan of Care (Insert Text)  Description  Physical Therapy Goals  Initiated 8/10/2019 and to be accomplished within 7 day(s)  1. Patient will move from supine to sit and sit to supine , scoot up and down and roll side to side in bed with minimal assistance/contact guard assist.     2.  Patient will transfer from bed to wheelchair and wheelchair to bed with moderate assistance using a transfer board. 3.  Patient/family will demonstrate independence with performance of home exercise program.  4.  Patient will demonstrate ability to self-propel in wheelchair x 50 feet with min Assistance   Outcome: Progressing Towards Goal   PHYSICAL THERAPY TREATMENT    Patient: Keith Simpson (60 y.o. female)  Date: 8/14/2019  Diagnosis: CVA (cerebral vascular accident) ([facility] [address] [I63.9] CVA (cerebral vascular accident) ([facility] [address]  Precautions: Fall, Skin  PLOF: Non-ambulatory    ASSESSMENT:  Mod A for supine to sit; assist with upper body/trunk support. Seated EOB with supervision for balance; 8 minutes. Completed seated exercise with constant verbal cuing. Frequent request to return to bed and stating \"she really wore me out\" form phlebotomy prior to PT. Noted significant knee flexion contractures to limit standing posture. Per patient, amb prior to admission with ww (per chart, non-ambulatory). Trialed sit to stand with max A. Does not demonstrate WB on BLE or preparation for participation in transfer. Unable to demonstrate participation in sit to stand transfer with 3 attempts. Returned to supine with mod A for BLE management. Education provided on bed mobility, transfers, ADLs, balance, amb, safety, exercise, role of PT, plan of care, cognition, skin integrity, vitals as indicated. Educated on need for RN assistance with mobility; verbalized understanding. Call bell in reach.      Progression toward goals:   ?      Improving appropriately and progressing toward goals  ? Improving slowly and progressing toward goals  ? Not making progress toward goals and plan of care will be adjusted     PLAN:  Patient continues to benefit from skilled intervention to address the above impairments. Continue treatment per established plan of care. Discharge Recommendations:  Skilled Nursing Facility/Long term care     SUBJECTIVE:   Patient stated She really wore me out.     OBJECTIVE DATA SUMMARY:   Critical Behavior:  Neurologic State: Alert  Orientation Level: Oriented to person, Oriented to place  Cognition: Follows commands  Safety/Judgement: Decreased insight into deficits  Functional Mobility Training:  Bed Mobility:  Supine to Sit: Moderate assistance  Sit to Supine: Moderate assistance  Transfers:  Sit to stand: Unable  Balance:  Sitting: Impaired  Sitting - Static: Good (unsupported)  Sitting - Dynamic: Good (unsupported)     Neuro Re-Education:  Seated EOB 8 minutes  Therapeutic Exercises:   BLE knee extensions x10   Sit to stand attempt x3    Pain:  Pain level pre-treatment: 0/10  Pain level post-treatment: 0/10     Activity Tolerance:   Poor    After treatment:   ? Patient left in no apparent distress sitting up in chair  ? Patient left in no apparent distress in bed  ? Call bell left within reach  ? Nursing notified  ? Caregiver present  ? Bed alarm activated  ? SCDs applied      COMMUNICATION/EDUCATION:   ?         Role of physical therapy in the acute care setting. ?         Fall prevention education was provided and the patient/caregiver indicated understanding. ? Patient/family have participated as able in working toward goals and plan of care. ?         Patient/family agree to work toward stated goals and plan of care. ?         Patient understands intent and goals of therapy, but is neutral about his/her participation. ? Patient is unable to participate in stated goals/plan of care: ongoing with therapy staff.       Tha Degroot, PT Time Calculation: 15 mins

## 2019-08-14 NOTE — PROGRESS NOTES
Assume care of patient lying in bed with eyes closed. Arouse when approached. Alert and oriented to person, place and time. Denies pain or discomfort at present. Bed locked in lowest position. Call light within reach. Monitor every hour for assistance and needs.

## 2019-08-14 NOTE — PROGRESS NOTES
Patient has an accepting bed at [place] [address] started yesterday     Auth reference # [id number] clinicals faxed to [id number] and updated today again. Received call from [facility], she has gotten all the clinicals and stated that they are requesting a Peer to Peer [phone] to be schedule by 12 pm tomorrow.

## 2019-08-14 NOTE — PROGRESS NOTES
Problem: Falls - Risk of  Goal: *Absence of Falls  Description  Document Ken Fells Fall Risk and appropriate interventions in the flowsheet. Outcome: Progressing Towards Goal  Note:   Fall Risk Interventions:  Mobility Interventions: Bed/chair exit alarm    Mentation Interventions: Door open when patient unattended, Eyeglasses and hearing aids    Medication Interventions: Bed/chair exit alarm    Elimination Interventions: Call light in reach              Problem: Falls - Risk of  Goal: *Absence of Falls  Description  Document Ken Fells Fall Risk and appropriate interventions in the flowsheet. Outcome: Progressing Towards Goal  Note:   Fall Risk Interventions:  Mobility Interventions: Bed/chair exit alarm    Mentation Interventions: Door open when patient unattended, Eyeglasses and hearing aids    Medication Interventions: Bed/chair exit alarm    Elimination Interventions: Call light in reach              Problem: Pressure Injury - Risk of  Goal: *Prevention of pressure injury  Description  Document Bassem Scale and appropriate interventions in the flowsheet.   Outcome: Progressing Towards Goal  Note:   Pressure Injury Interventions:  Sensory Interventions: Assess need for specialty bed    Moisture Interventions: Apply protective barrier, creams and emollients    Activity Interventions: Increase time out of bed    Mobility Interventions: HOB 30 degrees or less    Nutrition Interventions: Discuss nutritional consult with provider    Friction and Shear Interventions: HOB 30 degrees or less                Problem: TIA/CVA Stroke: Day 2 Until Discharge  Goal: Off Pathway (Use only if patient is Off Pathway)  Outcome: Progressing Towards Goal     Problem: TIA/CVA Stroke: Day 2 Until Discharge  Goal: Diagnostic Test/Procedures  Outcome: Progressing Towards Goal

## 2019-08-15 LAB
ANION GAP SERPL CALC-SCNC: 1 MMOL/L (ref 3–18)
BACTERIA SPEC CULT: NORMAL
BACTERIA SPEC CULT: NORMAL
BUN SERPL-MCNC: 10 MG/DL (ref 7–18)
BUN/CREAT SERPL: 14 (ref 12–20)
CALCIUM SERPL-MCNC: 11.6 MG/DL (ref 8.5–10.1)
CHLORIDE SERPL-SCNC: 113 MMOL/L (ref 100–111)
CO2 SERPL-SCNC: 27 MMOL/L (ref 21–32)
CREAT SERPL-MCNC: 0.74 MG/DL (ref 0.6–1.3)
GLUCOSE SERPL-MCNC: 92 MG/DL (ref 74–99)
POTASSIUM SERPL-SCNC: 4.4 MMOL/L (ref 3.5–5.5)
SERVICE CMNT-IMP: NORMAL
SERVICE CMNT-IMP: NORMAL
SODIUM SERPL-SCNC: 141 MMOL/L (ref 136–145)

## 2019-08-15 PROCEDURE — 74011250637 HC RX REV CODE- 250/637: Performed by: SPECIALIST

## 2019-08-15 PROCEDURE — 65270000029 HC RM PRIVATE

## 2019-08-15 PROCEDURE — 74011250636 HC RX REV CODE- 250/636: Performed by: HOSPITALIST

## 2019-08-15 PROCEDURE — 74011250637 HC RX REV CODE- 250/637: Performed by: HOSPITALIST

## 2019-08-15 PROCEDURE — 80048 BASIC METABOLIC PNL TOTAL CA: CPT

## 2019-08-15 PROCEDURE — 97530 THERAPEUTIC ACTIVITIES: CPT

## 2019-08-15 PROCEDURE — 97535 SELF CARE MNGMENT TRAINING: CPT

## 2019-08-15 PROCEDURE — 36415 COLL VENOUS BLD VENIPUNCTURE: CPT

## 2019-08-15 RX ORDER — ATORVASTATIN CALCIUM 40 MG/1
40 TABLET, FILM COATED ORAL
Qty: 30 TAB | Refills: 0 | Status: SHIPPED | OUTPATIENT
Start: 2019-08-15

## 2019-08-15 RX ORDER — CLOPIDOGREL BISULFATE 75 MG/1
75 TABLET ORAL DAILY
Qty: 30 TAB | Refills: 0 | Status: SHIPPED | OUTPATIENT
Start: 2019-08-15

## 2019-08-15 RX ADMIN — ATORVASTATIN CALCIUM 40 MG: 40 TABLET, FILM COATED ORAL at 23:42

## 2019-08-15 RX ADMIN — ENOXAPARIN SODIUM 40 MG: 40 INJECTION SUBCUTANEOUS at 13:36

## 2019-08-15 RX ADMIN — Medication 10 ML: at 22:00

## 2019-08-15 RX ADMIN — Medication 10 ML: at 14:00

## 2019-08-15 RX ADMIN — CLOPIDOGREL BISULFATE 75 MG: 75 TABLET ORAL at 12:02

## 2019-08-15 RX ADMIN — Medication 10 ML: at 05:18

## 2019-08-15 NOTE — PROGRESS NOTES
MD scheduled for PEER to PEER with [facility] today at [address] [facility] [place], operated by [facility]. Have spoken with patient's daughter Toni Rico to make her aware. Daughter stated that patient can not return home as she would be unsafe as she will not have anyone to take care of her. Her  has Alzhemiers and can not care for her mother. She is in the process of trying to secure placement for him as well. She stated that they do not qualify for Medicaid at this time as they will have to do a spend down to qualify. Made her aware that [facility] is willing to take patient private pay approximately $ 8000.00 a  Month. Daughter stated that she will call [facility] and speak with Liaison. Daughter is going out of town today and will not be back until tomorrow evening. PEER to PEER has been rescheduled to 3 pm.    Patient's daughter has contacted [facility] and has secured a room for one month private pay.  Spoke with Cheryl Jolly / luz marina and can take in the am.

## 2019-08-15 NOTE — DISCHARGE SUMMARY
2 [facility]  Hospitalist Division    Discharge Summary    Patient: Aretta Siemens MRN: [id number]  CSN: [id number]    YOB: 1940  Age: 70 y.o. Sex: female    DOA: 8/9/2019 LOS:  LOS: 6 days   Discharge Date:      Admission Diagnoses: CVA (cerebral vascular accident) [facility]) [I63.9]    Discharge Diagnoses:    Problem List as of 8/15/2019 Date Reviewed: 8/10/2019          Codes Class Noted - Resolved    UTI (urinary tract infection) ICD-10-CM: N39.0  ICD-9-CM: 599.0  8/13/2019 - Present        Underweight ICD-10-CM: R63.6  ICD-9-CM: 783.22  8/12/2019 - Present        * (Principal) CVA (cerebral vascular accident) ([facility] [address] ICD-10-CM: I63.9  ICD-9-CM: 434.91  8/9/2019 - Present        Hip fracture ([facility] [address] ICD-10-CM: I13.344N  ICD-9-CM: 820.8  2/22/2017 - Present              Discharge Condition: Stable    Discharge To: SNF    Consults: Neurology    HPI: Aretta Siemens is a 70 y.o. female who presented to the ER with right sided facial droop. She had reported not been feeling well for roughly two weeks prior to admission. She had been weak and lethargic. Her daughter found her confused and with facial droop. It had been two days since she had been seen without facial droop. She did not appear to have muscle weakness. She presented to the ER where she was evaluated by [facility]. She will be admitted for ongoing management. She is not in the tPA window.       Hospital Course: MRI/MRA done. Per neurology \"Right sided weakness, and confusion with a ddx of MR negative stroke in setting of Tapering flow signal left intradural vertebral artery, suggestive of slow flow, which may be related to developmentally small vessel although possibility of proximal vertebral artery stenosis should be considered &  Mild to moderate left posterior cerebral artery stenosis, additional mild likely atherosclerotic irregularity right middle cerebral and right vertebral arteries.  Vs. Exacerbation of old CVA\". Patient with allergy to ASA. Neurology recommended d/c on statin and plavix. Course of abx completed for UTI. PT/OT recommend SNF. VS and labs stable for discharge. Physical Exam:  Gen:  No distress, no complaint  Lungs:  Clear bilaterally, no wheeze or rhonchi  Heart:  Regular rate and rhythm, no murmurs or gallops  Abdomen:  Soft, non-tender, normal bowel sounds  Neuro: motor/sensory intact, still with R facial droop    Significant Diagnostic Studies: All lab results for the last 24 hours reviewed. Mra Brain Wo Cont    Result Date: 8/12/2019  MR angiogram of the [place] without contrast . CLINICAL INDICATION/HISTORY:  Facial droop, CVA. COMPARISON:  Correlation brain MRI same day. TECHNIQUE: 3-D time-of-flight imaging of the intracranial vasculature was performed. MIP reconstructions were obtained from the source images. Imaging performed on wide bore Discovery [id number] GEM suite 3T magnet at [facility]. _______________ FINDINGS: There is tapering flow signal within the left V3 and proximal V4 segment of the vertebral artery with flow seen within left PICA but no flow signal seen within the distal intradural segment. Mild atherosclerotic changes but no high-grade stenosis right intradural vertebral artery with normal basilar artery. Mild atherosclerotic changes distal ICAs without high-grade stenosis. Mild to moderate stenosis distal left P2 segment posterior cerebral artery. Mild likely atherosclerotic stenosis right M1 segment middle cerebral artery. Patent anterior communicating artery. There is no evidence of aneurysm formation or underlying vascular malformation. _______________     IMPRESSION:  1. Tapering flow signal left intradural vertebral artery, suggestive of slow flow, which may be related to developmentally small vessel although possibility of proximal vertebral artery stenosis should be considered.  2. Mild to moderate left posterior cerebral artery stenosis, additional mild likely atherosclerotic irregularity right middle cerebral and right vertebral arteries. Mri Brain Wo Cont    Result Date: 8/12/2019  EXAM: MRI brain without gadolinium CLINICAL INDICATION/HISTORY: cva   > Additional: Left-sided facial droop COMPARISON: 8/16/2010   > Reference Exam: CT head 8/9/2019 TECHNIQUE: SagittalFLAIR T1, axial T1, T2, FLAIR, susceptibility weighted, and diffusion sequences obtained of the brain. Patient could not tolerate further imaging for coronal sequences. Multiple repeat sequences obtained due to motion. _______________ FINDINGS: Diffusion:  There are no areas of restricted diffusion, no evidence of an acute infarction. Brain parenchyma:  Interval evolution of chronic lacunar infarct head of right caudate nucleus. Persistent moderate confluent and patchy increased T2 and FLAIR signal periventricular and deep cerebral white matter with stable small band of FLAIR hyperintensity anterior lateral margin right basal ganglia. No new cortical signal abnormality. No new mass hemorrhage or mass effect. Ventricles and sulci:  Interval progression of mild to moderate diffuse ventriculomegaly and diffuse cortical volume loss. Extra-axial:  No extra-axial fluid collection or mass is noted. Brain vasculature:  No vascular abnormality is appreciated on this routine brain MR examination. Craniocervical junction:  Normal. Skull base, extracranial and calvarium:  Previous bilateral lens implants. Very mild maxillary and ethmoid sinus mucosal thickening. IACs and mastoids unremarkable. Partial empty sella. No new skull abnormality. _______________     IMPRESSION: 1. No evidence of acute infarct or other new acute intracranial finding. 2. Chronic right basal ganglia infarct and persistent moderate bilateral cerebral white matter and right basal ganglia signal changes likely chronic microvascular ischemic disease.  3. Interval progression of mild to moderate diffuse volume loss.    Ct Head Wo Cont    Result Date: 8/10/2019  EXAM: CT head  CLINICAL INDICATION/HISTORY: Left-sided facial droop    --Additional history: None. COMPARISON: 05/16/2013 TECHNIQUE: Axial CT imaging of the head was performed without intravenous contrast. One or more dose reduction techniques were used on this CT: automated exposure control, adjustment of the mAs and/or kVp according to patient size, and iterative reconstruction techniques. The specific techniques used on this CT exam have been documented in the patient's electronic medical record. Digital Imaging and Communications in Medicine (DICOM) format image data are available to nonaffiliated external healthcare facilities or entities on a secure, media free, reciprocally searchable basis with patient authorization for at least a 12 month period after this study. _______________ FINDINGS: CALVARIUM: Intact. SOFT TISSUES/SCALP: Normal. SINUSES: Clear. BRAIN AND POSTERIOR FOSSA: There is proportional enlargement of the ventricles and cerebral spinal fluid spaces consistent with generalized cerebral volume loss. There is no intracranial hemorrhage, mass effect, or midline shift. There are scattered periventricular and subcortical white matter hypodensities present consistent with nonspecific gliosis. This is most commonly associated with sequelae of chronic microvascular ischemia. Stable focal hypodensity within the right caudate head representing lacunar infarct. EXTRA-AXIAL SPACES AND MENINGES: There are no abnormal extra-axial fluid collections. OTHER: There are atherosclerotic calcifications of the carotid siphons and intradural vertebral arteries. _______________     IMPRESSION: 1. No acute intracranial abnormalities. Specifically, no hemorrhage, mass effect or CT evident acute cortical infarction. 2. Generalized cerebral volume loss with sequelae of chronic microvascular ischemia. Preliminary report provided by the on-call radiology resident.     Kenyetta Zamora Chest Port    Result Date: 8/10/2019  EXAM: Frontal view of the chest  CLINICAL INDICATION/HISTORY: Sepsis, altered mental status    --Additional history: None. COMPARISON: 02/23/2017, 02/21/2017 TECHNIQUE: Frontal view of the chest _______________ FINDINGS: SUPPORT DEVICES: None. HEART AND MEDIASTINUM: Cardiac silhouette is normal in size. Normal mediastinal contours with atherosclerosis of the aorta. Normal pulmonary vasculature. LUNGS AND PLEURAL SPACES: Few patchy nodular opacities within the right upper lung and scattered reticulonodular opacities within the left midlung and left base. Sharp costophrenic sulci. No pneumothoraces. BONY THORAX AND SOFT TISSUES: Bones appear demineralized. _______________     IMPRESSION: Patchy nodular airspace disease within the right upper lobe and somewhat diffuse reticulonodular airspace disease within the left midlung and left base concerning for pneumonia. Discharge Medications:     Current Discharge Medication List      START taking these medications    Details   atorvastatin (LIPITOR) 40 mg tablet Take 1 Tab by mouth nightly. Qty: 30 Tab, Refills: 0      clopidogrel (PLAVIX) 75 mg tab Take 1 Tab by mouth daily. Indications: Stroke  Qty: 30 Tab, Refills: 0         CONTINUE these medications which have NOT CHANGED    Details   ergocalciferol (VITAMIN D2) 50,000 unit capsule Take 50,000 Units by mouth every seven (7) days. omega-3 fatty acids-vitamin e (FISH OIL) 1,000 mg cap Take 1 Cap by mouth. folic acid (FOLVITE) 1 mg tablet Take 1 mg by mouth two (2) times a day. glucosamine (GLUCOSAMINE RELIEF) 1,000 mg tab Take 1,500 mg by mouth. acetaminophen (TYLENOL) 325 mg tablet Take  by mouth every four (4) hours as needed for Pain. HYDROcodone-acetaminophen 5-500 mg cap Take 5 mg by mouth. methotrexate (RHEUMATREX) 2.5 mg tablet Take 2.5 mg by mouth Every Friday.          STOP taking these medications       oxyCODONE-acetaminophen (PERCOCET 7. 5) 7.5-325 mg per tablet Comments:   Reason for Stopping:         enoxaparin (LOVENOX) 30 mg/0.3 mL injection Comments:   Reason for Stopping:         pravastatin (PRAVACHOL) 20 mg tablet Comments:   Reason for Stopping:               Activity: PT/OT Eval and Treat    Diet: Soft solids, low sodium    Wound Care: None needed    Follow-up: Staff MD on arrival    Discharge time: >35 minutes    [facility]  Office:  [phone]  Pager: [phone]      8/15/2019, 8:15 AM

## 2019-08-15 NOTE — PROGRESS NOTES
Problem: Self Care Deficits Care Plan (Adult)  Goal: *Acute Goals and Plan of Care (Insert Text)  Description  Occupational Therapy Goals  Initiated 8/10/2019 within 7 day(s). Re-evaluated on 8/13/19 to update functional goals at appropriate. UPDATED GOALS Xochitl Isidro MS OTR/L)  1. Patient will perform grooming tasks at EOB with minimal assistance for bilateral coordination and supervision for sitting balance. 2.  Patient will perform lower body dressing with minimal assistance utilizing adaptive strategies and minimal assistance for dynamic sitting balance. 3.  Patient will perform functional task at EOB for 8 minutes with fair dynamic sitting balance and < 4 rest breaks to increase tolerance for ADLs. 4.  Patient will perform toilet transfers with maximum assistance utilizing AD, prn.  5.  Patient will perform all aspects of toileting with maximum assistance. 6.  Patient will participate in upper extremity therapeutic exercise/activities with minimal assistance for 8 minutes to increase BUE strength for functional transfers and ADLs. 1.  Patient will perform rolling in bed with minimal assistance/contact guard assist to assist with pericare  2. Patient will perform static sitting EOB with minimal assistance to prepare for grooming activities. 3.  Patient will perform toilet transfer with moderate assistance . 4. Patient will participate in upper extremity therapeutic exercise/activities with independence for 7 minutes to increase endurance for functional activities. 7.  Patient will utilize energy conservation techniques during functional activities with verbal/tactile cues as needed.       Outcome: Progressing Towards Goal     OCCUPATIONAL THERAPY TREATMENT    Patient: Jules Chung (32 y.o. female)  Date: 8/15/2019  Diagnosis: CVA (cerebral vascular accident) [facility]) [I63.9] CVA (cerebral vascular accident) ([address]       Precautions: Fall, Skin  PLOF: Pt required assistance for all ADLs; primarily bedbound per dtr report. Chart, occupational therapy assessment, plan of care, and goals were reviewed. ASSESSMENT: Pt supine on arrival, agreeable to therapy. Periodic confusion t/o session, however, pleasant & following all commands. No c/o pain. Mod A x1 for supine-->sit. Pt tolerated ~25 mins unsupported sitting with SBA/CGA for balance and multiple rest breaks during UB ADLs (mod A for UB bathing/dressing; mod A for hand hygiene for task thoroughness). Pt with small episode of bowel incontinence; max A to return supine. Min A for rolling; SBA to perform bridge position in prep for pericare. Min A for pericare to ensure cleanliness & thoroughness. Max A to scoot towards [facility]. Needs within reach. Family at bedside. Recommend SNF upon d/c. Progression toward goals:  ?          Improving appropriately and progressing toward goals  ? Improving slowly and progressing toward goals  ? Not making progress toward goals and plan of care will be adjusted     PLAN:  Patient continues to benefit from skilled intervention to address the above impairments. Continue treatment per established plan of care. Discharge Recommendations:  Chris Vera   Further Equipment Recommendations for Discharge: bedside commode     SUBJECTIVE:   Patient stated I want to buy school clothes for [employer].     OBJECTIVE DATA SUMMARY:   Cognitive/Behavioral Status:  Neurologic State: Alert  Orientation Level: Oriented to person, Oriented to place, Disoriented to time, Disoriented to situation  Cognition: Follows commands  Safety/Judgement: Decreased insight into deficits    Functional Mobility and Transfers for ADLs:   Bed Mobility:  Supine to Sit: Moderate assistance;Assist x1  Sit to Supine: Maximum assistance;Assist x1  Scooting: Maximum assistance;Assist x1     Transfers:  Sit to Stand: (not assessed)    Balance:  Sitting: Impaired  Sitting - Static: Fair (occasional)  Sitting - Dynamic: Fair (occasional)  Standing: (not assessed)    ADL Intervention:  Grooming  Grooming Assistance: Set-up; Supervision; Moderate assistance  Washing Face: Supervision  Washing Hands: Moderate assistance(for task thoroughness)  Cues: Physical assistance;Verbal cues provided    Upper Body Bathing  Bathing Assistance: Moderate assistance  Position Performed: Seated edge of bed  Cues: Physical assistance;Verbal cues provided    Lower Body Bathing  Bathing Assistance: Moderate assistance  Perineal  : Moderate assistance  Position Performed: Supine  Cues: Verbal cues provided(physical assistance)    Upper Body Dressing Assistance  Dressing Assistance: Moderate assistance  Hospital Gown: Moderate assistance  Cues: Physical assistance;Verbal cues provided    At EOB with SBA/CGA for balance, set-up/supervision for facial hygiene; mod A for hand hygiene with skilled instruction on task thoroughness. Mod A for UB dressing due to decreased bilateral shoulder ROM. Min A For rolling; SBA for static bridging during pericare. Min A to complete pericare for task thoroughness. Cognitive Retraining  Safety/Judgement: Decreased insight into deficits    Therapeutic Activity:  UB ADLs at EOB with fair dynamic sitting (with 3 rest breaks leaning posteriorly onto BUEs) for ~25 mins. Weightshifting at EOB for gown management with 1 rest break each side due to decreased activity tolerance. Static bridging ~2 mins with SBA in prep for pericare & LB dressing. Pain:  Pain level pre-treatment: 0/10   Pain level post-treatment: 0/10    Activity Tolerance:  Fair  Please refer to the flowsheet for vital signs taken during this treatment. After treatment:   ?  Patient left in no apparent distress sitting up in chair  ? Patient left in no apparent distress in bed  ? Call bell left within reach  ? Nursing notified  ? Caregiver present  ? Bed alarm activated    COMMUNICATION/EDUCATION:   ? Role of Occupational Therapy in the acute care setting  ? Home safety education was provided and the patient/caregiver indicated understanding. ? Patient/family have participated as able in working towards goals and plan of care. ? Patient/family agree to work toward stated goals and plan of care. ? Patient understands intent and goals of therapy, but is neutral about his/her participation. ? Patient is unable to participate in goal setting and plan of care.     Thank you for this referral.  Renata Bo MS OTR/L  Time Calculation: 39 mins

## 2019-08-15 NOTE — PROGRESS NOTES
Problem: Pressure Injury - Risk of  Goal: *Prevention of pressure injury  Description  Document Bassem Scale and appropriate interventions in the flowsheet. Outcome: Progressing Towards Goal  Note:   Pressure Injury Interventions:  Sensory Interventions: Avoid rigorous massage over bony prominences    Moisture Interventions: Maintain skin hydration (lotion/cream), Check for incontinence Q2 hours and as needed    Activity Interventions: Pressure redistribution bed/mattress(bed type)    Mobility Interventions: HOB 30 degrees or less, Pressure redistribution bed/mattress (bed type)    Nutrition Interventions: Offer support with meals,snacks and hydration    Friction and Shear Interventions: Minimize layers                Problem: Patient Education: Go to Patient Education Activity  Goal: Patient/Family Education  Outcome: Progressing Towards Goal     Problem: Falls - Risk of  Goal: *Absence of Falls  Description  Document Laurence Fall Risk and appropriate interventions in the flowsheet. Outcome: Progressing Towards Goal  Note:   Fall Risk Interventions:  Mobility Interventions: Patient to call before getting OOB    Mentation Interventions: Adequate sleep, hydration, pain control, Toileting rounds    Medication Interventions: Patient to call before getting OOB, Teach patient to arise slowly    Elimination Interventions:  Toileting schedule/hourly rounds              Problem: Patient Education: Go to Patient Education Activity  Goal: Patient/Family Education  Outcome: Progressing Towards Goal     Problem: Patient Education: Go to Patient Education Activity  Goal: Patient/Family Education  Outcome: Progressing Towards Goal     Problem: TIA/CVA Stroke: Day 2 Until Discharge  Goal: Off Pathway (Use only if patient is Off Pathway)  Outcome: Progressing Towards Goal  Goal: Activity/Safety  Outcome: Progressing Towards Goal  Goal: Diagnostic Test/Procedures  Outcome: Progressing Towards Goal  Goal: Nutrition/Diet  Outcome: Progressing Towards Goal  Goal: Discharge Planning  Outcome: Progressing Towards Goal  Goal: Medications  Outcome: Progressing Towards Goal  Goal: Respiratory  Outcome: Progressing Towards Goal  Goal: Treatments/Interventions/Procedures  Outcome: Progressing Towards Goal  Goal: Psychosocial  Outcome: Progressing Towards Goal  Goal: *Verbalizes anxiety and depression are reduced or absent  Outcome: Progressing Towards Goal  Goal: *Absence of aspiration  Outcome: Progressing Towards Goal  Goal: *Absence of deep venous thrombosis signs and symptoms(Stroke Metric)  Outcome: Progressing Towards Goal  Goal: *Optimal pain control at patient's stated goal  Outcome: Progressing Towards Goal  Goal: *Tolerating diet  Outcome: Progressing Towards Goal  Goal: *Ability to perform ADLs and demonstrates progressive mobility and function  Outcome: Progressing Towards Goal  Goal: *Stroke education continued(Stroke Metric)  Outcome: Progressing Towards Goal     Problem: Ischemic Stroke: Discharge Outcomes  Goal: *Verbalizes anxiety and depression are reduced or absent  Outcome: Progressing Towards Goal  Goal: *Verbalize understanding of risk factor modification(Stroke Metric)  Outcome: Progressing Towards Goal  Goal: *Hemodynamically stable  Outcome: Progressing Towards Goal  Goal: *Absence of aspiration pneumonia  Outcome: Progressing Towards Goal  Goal: *Aware of needed dietary changes  Outcome: Progressing Towards Goal  Goal: *Verbalize understanding of prescribed medications including anti-coagulants, anti-lipid, and/or anti-platelets(Stroke Metric)  Outcome: Progressing Towards Goal  Goal: *Tolerating diet  Outcome: Progressing Towards Goal  Goal: *Aware of follow-up diagnostics related to anticoagulants  Outcome: Progressing Towards Goal  Goal: *Ability to perform ADLs and demonstrates progressive mobility and function  Outcome: Progressing Towards Goal  Goal: *Absence of DVT(Stroke Metric)  Outcome: Progressing Towards Goal  Goal: *Absence of aspiration  Outcome: Progressing Towards Goal  Goal: *Optimal pain control at patient's stated goal  Outcome: Progressing Towards Goal  Goal: *Home safety concerns addressed  Outcome: Progressing Towards Goal  Goal: *Describes available resources and support systems  Outcome: Progressing Towards Goal  Goal: *Verbalizes understanding of activation of EMS(911) for stroke symptoms(Stroke Metric)  Outcome: Progressing Towards Goal  Goal: *Understands and describes signs and symptoms to report to providers(Stroke Metric)  Outcome: Progressing Towards Goal  Goal: *Neurolgocially stable (absence of additional neurological deficits)  Outcome: Progressing Towards Goal  Goal: *Verbalizes importance of follow-up with primary care physician(Stroke Metric)  Outcome: Progressing Towards Goal  Goal: *Smoking cessation discussed,if applicable(Stroke Metric)  Outcome: Progressing Towards Goal  Goal: *Depression screening completed(Stroke Metric)  Outcome: Progressing Towards Goal     Problem: Pain  Goal: *Control of Pain  Outcome: Progressing Towards Goal  Goal: *PALLIATIVE CARE:  Alleviation of Pain  Outcome: Progressing Towards Goal     Problem: Patient Education: Go to Patient Education Activity  Goal: Patient/Family Education  Outcome: Progressing Towards Goal     Problem: Discharge Planning  Goal: *Discharge to safe environment  Outcome: Progressing Towards Goal     Problem: Patient Education: Go to Patient Education Activity  Goal: Patient/Family Education  Outcome: Progressing Towards Goal     Problem: Patient Education: Go to Patient Education Activity  Goal: Patient/Family Education  Outcome: Progressing Towards Goal     Problem: Patient Education: Go to Patient Education Activity  Goal: Patient/Family Education  Outcome: Progressing Towards Goal     Problem: Nutrition Deficit  Goal: *Optimize nutritional status  Outcome: Progressing Towards Goal

## 2019-08-15 NOTE — PROGRESS NOTES
Problem: Pressure Injury - Risk of  Goal: *Prevention of pressure injury  Description  Document Bassem Scale and appropriate interventions in the flowsheet. Outcome: Progressing Towards Goal  Note:   Pressure Injury Interventions:  Sensory Interventions: Avoid rigorous massage over bony prominences    Moisture Interventions: Maintain skin hydration (lotion/cream), Check for incontinence Q2 hours and as needed    Activity Interventions: Pressure redistribution bed/mattress(bed type)    Mobility Interventions: HOB 30 degrees or less, Pressure redistribution bed/mattress (bed type)    Nutrition Interventions: Offer support with meals,snacks and hydration    Friction and Shear Interventions: Minimize layers                Problem: Patient Education: Go to Patient Education Activity  Goal: Patient/Family Education  Outcome: Progressing Towards Goal     Problem: Falls - Risk of  Goal: *Absence of Falls  Description  Document Laurence Fall Risk and appropriate interventions in the flowsheet. Outcome: Progressing Towards Goal  Note:   Fall Risk Interventions:  Mobility Interventions: Patient to call before getting OOB    Mentation Interventions: Adequate sleep, hydration, pain control, Toileting rounds    Medication Interventions: Patient to call before getting OOB, Teach patient to arise slowly    Elimination Interventions:  Toileting schedule/hourly rounds              Problem: Patient Education: Go to Patient Education Activity  Goal: Patient/Family Education  Outcome: Progressing Towards Goal     Problem: TIA/CVA Stroke: Day 2 Until Discharge  Goal: Off Pathway (Use only if patient is Off Pathway)  Outcome: Progressing Towards Goal  Goal: Activity/Safety  Outcome: Progressing Towards Goal  Goal: Diagnostic Test/Procedures  Outcome: Progressing Towards Goal  Goal: Nutrition/Diet  Outcome: Progressing Towards Goal  Goal: *Absence of aspiration  Outcome: Progressing Towards Goal     Problem: Ischemic Stroke: Discharge Outcomes  Goal: *Hemodynamically stable  Outcome: Progressing Towards Goal  Goal: *Absence of aspiration pneumonia  Outcome: Progressing Towards Goal  Goal: *Aware of needed dietary changes  Outcome: Progressing Towards Goal     Problem: Pain  Goal: *Control of Pain  Outcome: Progressing Towards Goal

## 2019-08-15 NOTE — PROGRESS NOTES
Progress Note      Patient: Racquel Khanna               Sex: female          DOA: 8/9/2019       YOB: 1940      Age:  70 y.o.        LOS:  LOS: 6 days             CHIEF COMPLAINT:  Weakness and encephalopathy improving    Subjective:     Patient seen and examined at bedside. Has no complaints today. Objective:      Visit Vitals  /61   Pulse 79   Temp 97.8 °F (36.6 °C)   Resp 20   Ht 4' 10\" (1.473 m)   Wt 34.6 kg (76 lb 3.2 oz)   SpO2 94%   BMI 15.93 kg/m²       Physical Exam:  Gen:  No distress, no complaint  Lungs:  Clear bilaterally, no wheeze or rhonchi  Heart:  Regular rate and rhythm, no murmurs or gallops  Abdomen:  Soft, non-tender, normal bowel sounds  Neuro: motor/sensory intact, still with R facial droop      Lab/Data Reviewed:  BMP:   Lab Results   Component Value Date/Time     08/15/2019 08:45 AM    K 4.4 08/15/2019 08:45 AM     (H) 08/15/2019 08:45 AM    CO2 27 08/15/2019 08:45 AM    AGAP 1 (L) 08/15/2019 08:45 AM    GLU 92 08/15/2019 08:45 AM    BUN 10 08/15/2019 08:45 AM    CREA 0.74 08/15/2019 08:45 AM    GFRAA >60 08/15/2019 08:45 AM    GFRNA >60 08/15/2019 08:45 AM         Assessment/Plan     Principal Problem:    CVA (cerebral vascular accident) ([address] (8/9/2019)    Active Problems:    Underweight (8/12/2019)      UTI (urinary tract infection) (8/13/2019)        Plan:  MRI neg for stroke, neuro recommending to d/c on plavix and statin. Finished abx for UTI  Continue with PT  PT/OT rec SNF. Auth underway.    Peer to peer scheduled for today  Psych consult for capacity  DVT ppx

## 2019-08-15 NOTE — PROGRESS NOTES
P2P for SNF is scheduled @ 11am between Dr. Main Henderson & Dr. Humaira Zaragoza.     [facility] RN    Outcomes Manager  [phone]  [phone]

## 2019-08-15 NOTE — PROGRESS NOTES
[place] [facility] care not SNF, it was [employer]'s recommendation to start medicaid robbie and potentially look for medicaid pending bed.

## 2019-08-15 NOTE — PROGRESS NOTES
Received patient from Doctor Isiah Arrieta. Pt awake and in bed. Dual NIH completed at bedside. Bed locked in lowest position. Frequently used items and call light within reach. Uneventful night for pt. Pt treated per Mar.  No changes. 0745: Bedside shift change report given to Stephanie Holliday RN (oncoming nurse) by Ayo Spears RN (offgoing nurse). Report included the following information SBAR, MAR and Quality Measures. Opportunity for questions and clarification provided.

## 2019-08-15 NOTE — PROGRESS NOTES
Bedside and Verbal shift change report given to Myrtle Brewer RN (oncoming nurse) by Tino Burgess RN (offgoing nurse). Report included the following information SBAR, Kardex, MAR and Recent Results   Skin man: Demetra Winter : with GRISEL Phan    1030 ate 100% breakfast  1100 pt denies pain     1940Bedside and Verbal shift change report given to  (oncoming nurse) by Myrtle Brewer RN (offgoing nurse). Report included the following information SBAR, Kardex, MAR and Recent Results.

## 2019-08-16 VITALS
HEIGHT: 58 IN | TEMPERATURE: 98.5 F | HEART RATE: 72 BPM | DIASTOLIC BLOOD PRESSURE: 58 MMHG | WEIGHT: 74.96 LBS | BODY MASS INDEX: 15.73 KG/M2 | SYSTOLIC BLOOD PRESSURE: 108 MMHG | RESPIRATION RATE: 18 BRPM | OXYGEN SATURATION: 99 %

## 2019-08-16 PROCEDURE — 74011250637 HC RX REV CODE- 250/637: Performed by: SPECIALIST

## 2019-08-16 PROCEDURE — 97542 WHEELCHAIR MNGMENT TRAINING: CPT

## 2019-08-16 PROCEDURE — 97110 THERAPEUTIC EXERCISES: CPT

## 2019-08-16 PROCEDURE — 97535 SELF CARE MNGMENT TRAINING: CPT

## 2019-08-16 RX ADMIN — CLOPIDOGREL BISULFATE 75 MG: 75 TABLET ORAL at 11:01

## 2019-08-16 RX ADMIN — Medication 10 ML: at 06:00

## 2019-08-16 NOTE — PROGRESS NOTES
I was notified that transportation has not yet picked up patient. I called [facility] and spoke to Lolita. He stated they would be here in another 10-15 minutes. Madison and pt notified. I tried to call Unit Rn, her phone was not working.

## 2019-08-16 NOTE — PROGRESS NOTES
Problem: Mobility Impaired (Adult and Pediatric)  Goal: *Acute Goals and Plan of Care (Insert Text)  Description  Physical Therapy Goals  Initiated 8/10/2019 and to be accomplished within 7 day(s)  1. Patient will move from supine to sit and sit to supine , scoot up and down and roll side to side in bed with minimal assistance/contact guard assist.     2.  Patient will transfer from bed to wheelchair and wheelchair to bed with moderate assistance using a transfer board. 3.  Patient/family will demonstrate independence with performance of home exercise program.  4.  Patient will demonstrate ability to self-propel in wheelchair x 50 feet with min Assistance   Outcome: Progressing Towards Goal   PHYSICAL THERAPY TREATMENT    Patient: Dinora Plaza (88 y.o. female)  Date: 8/16/2019  Diagnosis: CVA (cerebral vascular accident) ([facility] [address] [I63.9] CVA (cerebral vascular accident) ([facility] [address]       Precautions: Fall, Skin  PLOF: Ambulatory with AD    ASSESSMENT:  Patient supine in bed, agreeable to participation with PT. Co-treated with OT to maximize safety with mobility. Set-up A/MOD A for sliding board transfer to w/c; verbal cues for hand placement. Patient educated on w/c management/negotiations. MIN A for propulsion and turning x 15 ft in room environment to access restroom. Patient left seated in w/c for mobility. All needs within reach. Patient educated on role of PT, PT POC, bed mobility, transfers, gait, and safety with mobility as indicated. Progression toward goals:   ?      Improving appropriately and progressing toward goals  ? Improving slowly and progressing toward goals  ? Not making progress toward goals and plan of care will be adjusted     PLAN:  Patient continues to benefit from skilled intervention to address the above impairments. Continue treatment per established plan of care.   Discharge Recommendations:  Chris Vera  Further Equipment Recommendations for Discharge: Wheelchair      SUBJECTIVE:   Patient stated I'm going to rehab soon .     OBJECTIVE DATA SUMMARY:   Critical Behavior:  Neurologic State: Alert  Orientation Level: Oriented X4  Cognition: Follows commands  Safety/Judgement: Decreased insight into deficits  Functional Mobility Training:  Bed Mobility:     Supine to Sit: Minimum assistance; Additional time  Scooting: Minimum assistance;Assist x2    Transfers:  Bed to Chair: Moderate assistance;Assist x2; Additional time(EOB --> w/c xfer w/slide board)    Balance:  Sitting: Impaired  Sitting - Static: Fair (occasional)  Sitting - Dynamic: Fair (occasional)    Ambulation/Gait Training:  MIN A for w/c propulsion/turning x 15 ft using B UEs    Pain:  None  Pain level pre-treatment: 0/10  Pain level post-treatment: 0/10   Pain Intervention(s): N/A    Activity Tolerance:   Good    Please refer to the flowsheet for vital signs taken during this treatment. After treatment:   ? Patient left in no apparent distress sitting up in chair  ? Patient left in no apparent distress in bed  ? Call bell left within reach  ? Nursing notified  ? Caregiver present  ? Bed alarm activated  ? SCDs applied      COMMUNICATION/EDUCATION:   ?         Role of Physical Therapy in the acute care setting. ?         Fall prevention education was provided and the patient/caregiver indicated understanding. ? Patient/family have participated as able in working toward goals and plan of care. ?         Patient/family agree to work toward stated goals and plan of care. ?         Patient understands intent and goals of therapy, but is neutral about his/her participation. ? Patient is unable to participate in stated goals/plan of care: ongoing with therapy staff.   ?         Other:        Blessing Montiel   Time Calculation: 30 mins

## 2019-08-16 NOTE — PROGRESS NOTES
0800 Received report from off going RN at the bedside inclusive of SBAR, lines drains drips and plan. 0900 Patient scheduled to leave unit at 1330.   1130 Report called to Unit RN  Of rehab facility, spoke with dolores RECINOS and report given. 1530 Both PIV removed at this time, transport persons here to transport patient via stretcher to rehab. Family at the bedside, patient packed and ready to go. VS within range for patient. Discharge prepared by Discharge RN Dominik.

## 2019-08-16 NOTE — PROGRESS NOTES
Problem: Pressure Injury - Risk of  Goal: *Prevention of pressure injury  Description  Document Bassem Scale and appropriate interventions in the flowsheet. Outcome: Progressing Towards Goal  Note:   Pressure Injury Interventions:  Sensory Interventions: Assess need for specialty bed    Moisture Interventions: Absorbent underpads, Apply protective barrier, creams and emollients    Activity Interventions: Assess need for specialty bed    Mobility Interventions: Assess need for specialty bed    Nutrition Interventions: Discuss nutritional consult with provider, Document food/fluid/supplement intake    Friction and Shear Interventions: Apply protective barrier, creams and emollients                Problem: Patient Education: Go to Patient Education Activity  Goal: Patient/Family Education  Outcome: Progressing Towards Goal     Problem: Falls - Risk of  Goal: *Absence of Falls  Description  Document Zulyhugo Dalyo Fall Risk and appropriate interventions in the flowsheet.   Outcome: Progressing Towards Goal  Variance Patient slowly responding  Note:   Fall Risk Interventions:  Mobility Interventions: Bed/chair exit alarm    Mentation Interventions: Bed/chair exit alarm    Medication Interventions: Bed/chair exit alarm    Elimination Interventions: Call light in reach    History of Falls Interventions: Bed/chair exit alarm         Problem: Patient Education: Go to Patient Education Activity  Goal: Patient/Family Education  Outcome: Progressing Towards Goal     Problem: Patient Education: Go to Patient Education Activity  Goal: Patient/Family Education  Outcome: Progressing Towards Goal     Problem: TIA/CVA Stroke: Day 2 Until Discharge  Goal: Off Pathway (Use only if patient is Off Pathway)  Outcome: Progressing Towards Goal  Goal: Activity/Safety  Outcome: Progressing Towards Goal  Goal: Diagnostic Test/Procedures  Outcome: Progressing Towards Goal  Goal: Nutrition/Diet  Outcome: Progressing Towards Goal  Goal: Discharge Planning  Outcome: Progressing Towards Goal  Goal: Medications  Outcome: Progressing Towards Goal  Goal: Respiratory  Outcome: Progressing Towards Goal  Goal: Treatments/Interventions/Procedures  Outcome: Progressing Towards Goal  Goal: Psychosocial  Outcome: Progressing Towards Goal  Goal: *Verbalizes anxiety and depression are reduced or absent  Outcome: Progressing Towards Goal  Goal: *Absence of aspiration  Outcome: Progressing Towards Goal  Goal: *Absence of deep venous thrombosis signs and symptoms(Stroke Metric)  Outcome: Progressing Towards Goal  Goal: *Optimal pain control at patient's stated goal  Outcome: Progressing Towards Goal  Goal: *Tolerating diet  Outcome: Progressing Towards Goal  Goal: *Ability to perform ADLs and demonstrates progressive mobility and function  Outcome: Progressing Towards Goal  Goal: *Stroke education continued(Stroke Metric)  Outcome: Progressing Towards Goal     Problem: Ischemic Stroke: Discharge Outcomes  Goal: *Verbalizes anxiety and depression are reduced or absent  Outcome: Progressing Towards Goal  Goal: *Verbalize understanding of risk factor modification(Stroke Metric)  Outcome: Progressing Towards Goal  Goal: *Hemodynamically stable  Outcome: Progressing Towards Goal  Goal: *Absence of aspiration pneumonia  Outcome: Progressing Towards Goal  Goal: *Aware of needed dietary changes  Outcome: Progressing Towards Goal  Goal: *Verbalize understanding of prescribed medications including anti-coagulants, anti-lipid, and/or anti-platelets(Stroke Metric)  Outcome: Progressing Towards Goal  Goal: *Tolerating diet  Outcome: Progressing Towards Goal  Goal: *Aware of follow-up diagnostics related to anticoagulants  Outcome: Progressing Towards Goal  Goal: *Ability to perform ADLs and demonstrates progressive mobility and function  Outcome: Progressing Towards Goal  Goal: *Absence of DVT(Stroke Metric)  Outcome: Progressing Towards Goal  Goal: *Absence of aspiration  Outcome: Progressing Towards Goal  Goal: *Optimal pain control at patient's stated goal  Outcome: Progressing Towards Goal  Goal: *Home safety concerns addressed  Outcome: Progressing Towards Goal  Goal: *Describes available resources and support systems  Outcome: Progressing Towards Goal  Goal: *Verbalizes understanding of activation of EMS(911) for stroke symptoms(Stroke Metric)  Outcome: Progressing Towards Goal  Goal: *Understands and describes signs and symptoms to report to providers(Stroke Metric)  Outcome: Progressing Towards Goal  Goal: *Neurolgocially stable (absence of additional neurological deficits)  Outcome: Progressing Towards Goal  Goal: *Verbalizes importance of follow-up with primary care physician(Stroke Metric)  Outcome: Progressing Towards Goal  Goal: *Smoking cessation discussed,if applicable(Stroke Metric)  Outcome: Progressing Towards Goal  Goal: *Depression screening completed(Stroke Metric)  Outcome: Progressing Towards Goal     Problem: Pain  Goal: *Control of Pain  Outcome: Progressing Towards Goal  Goal: *PALLIATIVE CARE:  Alleviation of Pain  Outcome: Progressing Towards Goal     Problem: Patient Education: Go to Patient Education Activity  Goal: Patient/Family Education  Outcome: Progressing Towards Goal     Problem: Discharge Planning  Goal: *Discharge to safe environment  Outcome: Progressing Towards Goal     Problem: Patient Education: Go to Patient Education Activity  Goal: Patient/Family Education  Outcome: Progressing Towards Goal     Problem: Patient Education: Go to Patient Education Activity  Goal: Patient/Family Education  Outcome: Progressing Towards Goal     Problem: Patient Education: Go to Patient Education Activity  Goal: Patient/Family Education  Outcome: Progressing Towards Goal     Problem: Nutrition Deficit  Goal: *Optimize nutritional status  Outcome: Progressing Towards Goal

## 2019-08-16 NOTE — PROGRESS NOTES
Copy of UAI from Health Dept given to CHEN Purvis RN    Outcomes Manager  [phone]  [phone]

## 2019-08-16 NOTE — PROGRESS NOTES
Problem: Self Care Deficits Care Plan (Adult)  Goal: *Acute Goals and Plan of Care (Insert Text)  Description  Occupational Therapy Goals  Initiated 8/10/2019 within 7 day(s). Re-evaluated on 8/13/19 to update functional goals at appropriate. UPDATED GOALS Tierra Robledo, MS OTR/L)  1. Patient will perform grooming tasks at EOB with minimal assistance for bilateral coordination and supervision for sitting balance. 2.  Patient will perform lower body dressing with minimal assistance utilizing adaptive strategies and minimal assistance for dynamic sitting balance. 3.  Patient will perform functional task at EOB for 8 minutes with fair dynamic sitting balance and < 4 rest breaks to increase tolerance for ADLs. 4.  Patient will perform toilet transfers with maximum assistance utilizing AD, prn.  5.  Patient will perform all aspects of toileting with maximum assistance. 6.  Patient will participate in upper extremity therapeutic exercise/activities with minimal assistance for 8 minutes to increase BUE strength for functional transfers and ADLs. 1.  Patient will perform rolling in bed with minimal assistance/contact guard assist to assist with pericare  2. Patient will perform static sitting EOB with minimal assistance to prepare for grooming activities. 3.  Patient will perform toilet transfer with moderate assistance . 4. Patient will participate in upper extremity therapeutic exercise/activities with independence for 7 minutes to increase endurance for functional activities. 7.  Patient will utilize energy conservation techniques during functional activities with verbal/tactile cues as needed.       Outcome: Progressing Towards Goal   OCCUPATIONAL THERAPY TREATMENT    Patient: Jac Rasheed (64 y.o. female)  Date: 8/16/2019  Diagnosis: CVA (cerebral vascular accident) [facility]) [I63.9] CVA (cerebral vascular accident) [facility])       Precautions: Fall, Skin  PLOF: Pt reports independent    Chart, occupational therapy assessment, plan of care, and goals were reviewed. ASSESSMENT:  Pt co-treated w/PT to maximize safety w/functional transfer training and use of slide board. Pt requires 2 person assist and increase time w/functional transfer to w/c w/slide board. Pt performs BUE ThereEx w/functional mobility at w/c level to bathroom. Pt requires assist for thoroughness w/complex pérez care at sinkside. Pt left in w/c and reinforced importance of calling for assistance. Progression toward goals:  ?          Improving appropriately and progressing toward goals  ? Improving slowly and progressing toward goals  ? Not making progress toward goals and plan of care will be adjusted     PLAN:  Patient continues to benefit from skilled intervention to address the above impairments. Continue treatment per established plan of care. Discharge Recommendations:  Skilled Nursing Facility/LTC  Further Equipment Recommendations for Discharge:  wheelchair and hospital bed      SUBJECTIVE:   Patient stated This is the kind of wheelchair that I need.     OBJECTIVE DATA SUMMARY:   Cognitive/Behavioral Status:  Neurologic State: Alert  Orientation Level: Oriented X4  Cognition: Follows commands  Safety/Judgement: Decreased insight into deficits    Functional Mobility and Transfers for ADLs:   Bed Mobility:  Supine to Sit: Minimum assistance; Additional time  Scooting: Minimum assistance;Assist x2   Transfers:  Bed to Chair: Moderate assistance;Assist x2; Additional time(EOB --> w/c xfer w/slide board)  Balance:  Sitting: Impaired  Sitting - Static: Fair (occasional)  Sitting - Dynamic: Fair (occasional)    ADL Intervention:  Grooming  Washing Face: Supervision  Washing Hands: Minimum assistance(assist w/complex nail care)    UE Therapeutic Exercises:   AAROM > AROM RUE shoulder flexion, at bed level  Pt performs BUE TherEx w/functional mobility at w/c level for increase activity tolerance w/ADLs.      Pain:  Pain level pre-treatment: 0/10   Pain level post-treatment: 0/10    Activity Tolerance:    Fair    Please refer to the flowsheet for vital signs taken during this treatment. After treatment:   ?  Patient left in no apparent distress sitting up in chair  ? Patient left in no apparent distress in bed  ? Call bell left within reach  ? Nursing notified  ? Caregiver present  ? Bed alarm activated    COMMUNICATION/EDUCATION:   ? Role of Occupational Therapy in the acute care setting  ? Home safety education was provided and the patient/caregiver indicated understanding. ? Patient/family have participated as able in working towards goals and plan of care. ? Patient/family agree to work toward stated goals and plan of care. ? Patient understands intent and goals of therapy, but is neutral about his/her participation. ? Patient is unable to participate in goal setting and plan of care.       Thank you for this referral.  MAZIN Dalal  Time Calculation: 42 mins

## 2019-08-16 NOTE — PROGRESS NOTES
DMAS-95 missing from UAI done by the Health Dept. Called and spoke to Martha Willams, screening nurse. She faxed a DMAS- 95 completed by hand. Faxed to [facility]. Copy of UAI made for chart(taken to HIM) and copy to CM file.     Shelly Palafox RN    Outcomes Manager  [phone]  [phone]

## 2019-08-16 NOTE — PROGRESS NOTES
Transportation at Discharge: 8/16/19    Transport Company/Representative:  [employer] / [facility]  Transportation Phone number: [phone]  Method of Transport: Stretcher / BLS    Estimated pick-up time: 1:30P  Destination: [address]: [facility]  Authorization: No auth required    Requesting Outcomes Manager:  Clara Garza, Care- ext [phone]

## 2019-08-16 NOTE — PROGRESS NOTES
Problem: Discharge Planning  Goal: *Discharge to safe environment  Outcome: Progressing Towards Goal     I talked with Hawa at [facility], she says they will accept pt today room 116. I called  Edinson Samuel CM and she says Edwin allows 15 free round trips for pts per year and she will email me back with info. PCS form faxed to [facility] for transportion. [facility] transport to arrive 1:30, MD gabriel, RN and Raoul Trinidad at [facility] all aware of time. Carlito Adler.  PETTY WhiteheadN  [facility]  [phone], Pager [phone]  [email or web address]

## 2019-08-27 ENCOUNTER — PATIENT OUTREACH (OUTPATIENT)
Dept: CASE MANAGEMENT | Age: 79
End: 2019-08-27

## 2019-08-27 NOTE — PROGRESS NOTES
Community Care Team Documentation for Patient in [facility]     Patient discharged from [facility]/[address] [address] [facility] to [facility], on 8/16/19. Hospital Discharge diagnosis:       UTI   CVA      SNF Attending Provider:       Anticipated discharge date from SNF:  N/a will transition to LTC per daughter. PCP : Yodit Concepcion MD    Nurse Navigator:     [facility] rounds completed, updates provided by facility. Brief Summary of Care:    Patient is receiving PT/OT/ST. Reg diet . OT working on self feeding & dressing. She is max assist for transferring and is unable to ambulate at this time. She does try to assist with transferring. Still smoking. Dispo:  Per daughter, planning on patient staying long term at [facility]. Will follow for 30 days LEYLA. RRAT:  Low Risk            7       Total Score        3 Has Seen PCP in Last 6 Months (Yes=3, No=0)    4 Charlson Comorbidity Score (Age + Comorbid Conditions)        Criteria that do not apply:    . Living with Significant Other. Assisted Living. LTAC. SNF. or   Rehab    Patient Length of Stay (>5 days = 3)    IP Visits Last 12 Months (1-3=4, 4=9, >4=11)    Pt.  Coverage (Medicare=5 , Medicaid, or Self-Pay=4)        Active Ambulatory Problems     Diagnosis Date Noted    Hip fracture ([address] 02/22/2017    CVA (cerebral vascular accident) ([address] 08/09/2019    Underweight 08/12/2019    UTI (urinary tract infection) 08/13/2019     Resolved Ambulatory Problems     Diagnosis Date Noted    No Resolved Ambulatory Problems     Past Medical History:   Diagnosis Date    Rheumatoid arthritis, adult ([address]     Tachycardia

## 2019-09-04 ENCOUNTER — PATIENT OUTREACH (OUTPATIENT)
Dept: CASE MANAGEMENT | Age: 79
End: 2019-09-04

## 2019-09-04 NOTE — PROGRESS NOTES
Community Care Team Documentation for Patient in [facility]     Patient discharged from [address] to [facility], on 8/16/19. Hospital Discharge diagnosis:       UTI   CVA      SNF Attending Provider:       Anticipated discharge date from SNF:  N/a will transition to LTC per daughter. PCP : Stephanie Lee MD    Nurse Navigator:     [facility] rounds completed, updates provided by facility. Brief Summary of Care:    Patient last day of therapy is 9/5/19. Has progressed to Reg diet . She is max assist for transferring and is unable to ambulate at this time. She does try to assist with transferring. Still smoking. Dispo:  Per daughter, planning on patient staying long term at [facility]. Will follow for 30 days [facility]. RRAT:  Low Risk            7       Total Score        3 Has Seen PCP in Last 6 Months (Yes=3, No=0)    4 Charlson Comorbidity Score (Age + Comorbid Conditions)        Criteria that do not apply:    . Living with Significant Other. Assisted Living. LTAC. SNF. or   Rehab    Patient Length of Stay (>5 days = 3)    IP Visits Last 12 Months (1-3=4, 4=9, >4=11)    Pt.  Coverage (Medicare=5 , Medicaid, or Self-Pay=4)        Active Ambulatory Problems     Diagnosis Date Noted    Hip fracture ([address] 02/22/2017    CVA (cerebral vascular accident) ([address] 08/09/2019    Underweight 08/12/2019    UTI (urinary tract infection) 08/13/2019     Resolved Ambulatory Problems     Diagnosis Date Noted    No Resolved Ambulatory Problems     Past Medical History:   Diagnosis Date    Rheumatoid arthritis, adult ([address]     Tachycardia

## 2019-09-11 ENCOUNTER — PATIENT OUTREACH (OUTPATIENT)
Dept: CASE MANAGEMENT | Age: 79
End: 2019-09-11

## 2019-09-11 NOTE — PROGRESS NOTES
Community Care Team Documentation for Patient in [facility]     Patient discharged from [facility] [facility], on 8/16/19. Hospital Discharge diagnosis:       UTI   CVA      SNF Attending Provider:       Anticipated discharge date from SNF:  N/a will transition to LTC per daughter. PCP : Yoko Urbina MD    Nurse Navigator:     [facility] rounds completed, updates provided by facility. Brief Summary of Care:    Patient last day of therapy is 9/5/19. Has progressed to Reg diet . She is max assist for transferring and is unable to ambulate at this time. She does try to assist with transferring. Still smoking. Dispo:  Per daughter, planning on patient staying long term at [facility]. Will follow for 30 days LEY[facility]. RRAT:  Low Risk            7       Total Score        3 Has Seen PCP in Last 6 Months (Yes=3, No=0)    4 Charlson Comorbidity Score (Age + Comorbid Conditions)        Criteria that do not apply:    . Living with Significant Other. Assisted Living. LTAC. SNF. or   Rehab    Patient Length of Stay (>5 days = 3)    IP Visits Last 12 Months (1-3=4, 4=9, >4=11)    Pt.  Coverage (Medicare=5 , Medicaid, or Self-Pay=4)        Active Ambulatory Problems     Diagnosis Date Noted    Hip fracture ([address] 02/22/2017    CVA (cerebral vascular accident) ([address] 08/09/2019    Underweight 08/12/2019    UTI (urinary tract infection) 08/13/2019     Resolved Ambulatory Problems     Diagnosis Date Noted    No Resolved Ambulatory Problems     Past Medical History:   Diagnosis Date    Rheumatoid arthritis, adult ([address]     Tachycardia

## 2019-09-14 ENCOUNTER — HOSPITAL ENCOUNTER (OUTPATIENT)
Dept: LAB | Age: 79
Discharge: HOME | End: 2019-09-14

## 2019-09-14 LAB
ALBUMIN SERPL-MCNC: 2.9 G/DL (ref 3.4–5)
ALBUMIN/GLOB SERPL: 0.8 {RATIO} (ref 0.8–1.7)
ALP SERPL-CCNC: 135 U/L (ref 45–117)
ALT SERPL-CCNC: 23 U/L (ref 13–56)
ANION GAP SERPL CALC-SCNC: 5 MMOL/L (ref 3–18)
AST SERPL-CCNC: 21 U/L (ref 10–38)
BASOPHILS # BLD: 0 K/UL (ref 0–0.1)
BASOPHILS NFR BLD: 0 % (ref 0–2)
BILIRUB SERPL-MCNC: 0.3 MG/DL (ref 0.2–1)
BUN SERPL-MCNC: 23 MG/DL (ref 7–18)
BUN/CREAT SERPL: 25 (ref 12–20)
CALCIUM SERPL-MCNC: 14.1 MG/DL (ref 8.5–10.1)
CHLORIDE SERPL-SCNC: 108 MMOL/L (ref 100–111)
CO2 SERPL-SCNC: 28 MMOL/L (ref 21–32)
CREAT SERPL-MCNC: 0.92 MG/DL (ref 0.6–1.3)
DIFFERENTIAL METHOD BLD: ABNORMAL
EOSINOPHIL # BLD: 0.4 K/UL (ref 0–0.4)
EOSINOPHIL NFR BLD: 4 % (ref 0–5)
ERYTHROCYTE [DISTWIDTH] IN BLOOD BY AUTOMATED COUNT: 14.8 % (ref 11.6–14.5)
GLOBULIN SER CALC-MCNC: 3.5 G/DL (ref 2–4)
GLUCOSE SERPL-MCNC: 97 MG/DL (ref 74–99)
HCT VFR BLD AUTO: 35.4 % (ref 35–45)
HGB BLD-MCNC: 11.4 G/DL (ref 12–16)
LYMPHOCYTES # BLD: 1.4 K/UL (ref 0.9–3.6)
LYMPHOCYTES NFR BLD: 17 % (ref 21–52)
MCH RBC QN AUTO: 32.3 PG (ref 24–34)
MCHC RBC AUTO-ENTMCNC: 32.2 G/DL (ref 31–37)
MCV RBC AUTO: 100.3 FL (ref 74–97)
MONOCYTES # BLD: 0.7 K/UL (ref 0.05–1.2)
MONOCYTES NFR BLD: 9 % (ref 3–10)
NEUTS SEG # BLD: 5.8 K/UL (ref 1.8–8)
NEUTS SEG NFR BLD: 70 % (ref 40–73)
PLATELET # BLD AUTO: 213 K/UL (ref 135–420)
PMV BLD AUTO: 10.6 FL (ref 9.2–11.8)
POTASSIUM SERPL-SCNC: 4.1 MMOL/L (ref 3.5–5.5)
PROT SERPL-MCNC: 6.4 G/DL (ref 6.4–8.2)
RBC # BLD AUTO: 3.53 M/UL (ref 4.2–5.3)
SODIUM SERPL-SCNC: 141 MMOL/L (ref 136–145)
WBC # BLD AUTO: 8.3 K/UL (ref 4.6–13.2)

## 2019-09-14 PROCEDURE — 80053 COMPREHEN METABOLIC PANEL: CPT

## 2019-09-14 PROCEDURE — 85025 COMPLETE CBC W/AUTO DIFF WBC: CPT

## 2019-09-18 ENCOUNTER — PATIENT OUTREACH (OUTPATIENT)
Dept: CASE MANAGEMENT | Age: 79
End: 2019-09-18

## 2019-09-18 NOTE — PROGRESS NOTES
Community Care Team Documentation for Patient in [facility]     Patient discharged from [address] to [facility], on 8/16/19. Hospital Discharge diagnosis:       UTI   CVA      SNF Attending Provider:       Anticipated discharge date from SNF:  N/a will transition to LTC per daughter. PCP : Caryn Encinas MD    Nurse Navigator:     [employer] rounds completed, updates provided by facility. Brief Summary of Care:    Patient last day of therapy is 9/5/19. Has progressed to Reg diet . She is max assist for transferring and is unable to ambulate at this time. She does try to assist with transferring. Still smoking. Have followed [facility]YLA x 30 days. Stable since D/c . Signing off. Dispo:  Staying LTC at facility. RRAT:  Low Risk            7       Total Score        3 Has Seen PCP in Last 6 Months (Yes=3, No=0)    4 Charlson Comorbidity Score (Age + Comorbid Conditions)        Criteria that do not apply:    . Living with Significant Other. Assisted Living. LTAC. SNF. or   Rehab    Patient Length of Stay (>5 days = 3)    IP Visits Last 12 Months (1-3=4, 4=9, >4=11)    Pt.  Coverage (Medicare=5 , Medicaid, or Self-Pay=4)        Active Ambulatory Problems     Diagnosis Date Noted    Hip fracture ([address] 02/22/2017    CVA (cerebral vascular accident) ([address] 08/09/2019    Underweight 08/12/2019    UTI (urinary tract infection) 08/13/2019     Resolved Ambulatory Problems     Diagnosis Date Noted    No Resolved Ambulatory Problems     Past Medical History:   Diagnosis Date    Rheumatoid arthritis, adult ([address]     Tachycardia
